# Patient Record
Sex: FEMALE | Race: WHITE | NOT HISPANIC OR LATINO | Employment: UNEMPLOYED | URBAN - METROPOLITAN AREA
[De-identification: names, ages, dates, MRNs, and addresses within clinical notes are randomized per-mention and may not be internally consistent; named-entity substitution may affect disease eponyms.]

---

## 2018-10-23 ENCOUNTER — TELEPHONE (OUTPATIENT)
Dept: NEUROLOGY | Facility: CLINIC | Age: 53
End: 2018-10-23

## 2018-10-23 NOTE — TELEPHONE ENCOUNTER
Called patient and scheduled appointment from referral. Patient is aware to be to appointment 15 minutes early and patient is aware of address. Patient expressed understanding.

## 2018-12-19 ENCOUNTER — OFFICE VISIT (OUTPATIENT)
Dept: NEUROLOGY | Facility: CLINIC | Age: 53
End: 2018-12-19
Payer: COMMERCIAL

## 2018-12-19 VITALS
HEART RATE: 84 BPM | BODY MASS INDEX: 25.44 KG/M2 | HEIGHT: 64 IN | SYSTOLIC BLOOD PRESSURE: 124 MMHG | RESPIRATION RATE: 16 BRPM | DIASTOLIC BLOOD PRESSURE: 82 MMHG | WEIGHT: 149 LBS

## 2018-12-19 DIAGNOSIS — G43.719 INTRACTABLE CHRONIC MIGRAINE WITHOUT AURA AND WITHOUT STATUS MIGRAINOSUS: Primary | ICD-10-CM

## 2018-12-19 PROCEDURE — 99205 OFFICE O/P NEW HI 60 MIN: CPT | Mod: S$GLB,,, | Performed by: PSYCHIATRY & NEUROLOGY

## 2018-12-19 PROCEDURE — 3008F BODY MASS INDEX DOCD: CPT | Mod: CPTII,S$GLB,, | Performed by: PSYCHIATRY & NEUROLOGY

## 2018-12-19 PROCEDURE — 99999 PR PBB SHADOW E&M-EST. PATIENT-LVL III: CPT | Mod: PBBFAC,,, | Performed by: PSYCHIATRY & NEUROLOGY

## 2018-12-19 RX ORDER — LIOTHYRONINE SODIUM 5 UG/1
TABLET ORAL
COMMUNITY
Start: 2018-12-13 | End: 2020-11-24 | Stop reason: ALTCHOICE

## 2018-12-19 RX ORDER — ESTRADIOL 0.05 MG/D
1 FILM, EXTENDED RELEASE TRANSDERMAL
COMMUNITY
Start: 2018-12-13

## 2018-12-19 RX ORDER — PROGESTERONE 100 MG/1
CAPSULE ORAL
Refills: 6 | COMMUNITY
Start: 2018-11-27

## 2018-12-19 RX ORDER — SPIRONOLACTONE 50 MG/1
TABLET, FILM COATED ORAL
COMMUNITY
Start: 2018-12-17 | End: 2022-03-03

## 2018-12-19 RX ORDER — ELETRIPTAN HYDROBROMIDE 40 MG/1
TABLET, FILM COATED ORAL
Qty: 9 TABLET | Refills: 5 | Status: SHIPPED | OUTPATIENT
Start: 2018-12-19 | End: 2019-01-15 | Stop reason: SDUPTHER

## 2018-12-19 RX ORDER — KETOROLAC TROMETHAMINE 15.75 MG/1
15.75 SPRAY, METERED NASAL EVERY 6 HOURS
Qty: 5 EACH | Refills: 3 | Status: SHIPPED | OUTPATIENT
Start: 2018-12-19 | End: 2019-09-06

## 2018-12-19 RX ORDER — ALBUTEROL SULFATE 90 UG/1
AEROSOL, METERED RESPIRATORY (INHALATION)
COMMUNITY
Start: 2018-12-13

## 2018-12-19 NOTE — PROGRESS NOTES
"Subjective:       Patient ID: Mayra Smart is a 53 y.o. female.    Chief Complaint: Headache    HPI   The patient is a pleasant 52 y/o female presenting with chief complaint of headaches. She first developed them around the age of 38 for no apparent reason. She was under the care of a neurologist for a long time. She has tried multiple medications without benefit or poor tolerability as listed below. She has tried different diets, including the "JJ diet" with no help. Hormonal adjustments ineffective as well. Drug holiday for 6 weeks has not helped. Amongst the acute medications, Relpax works the best, she has tried all other triptans, fioricet, fiorinal, and OTC analgesics.  Please see details of headache characteristics below.  Headache questionnaire    1. When did your Headaches start?    15 years ago      2. Where are your headaches located?   Temporal and behind eyes      3. Your headache's characteristics:   Pressure, Throbbing, Pounding, Stabbing, Like a tight band, Sharp      4. How long does the headache last?   days      5. How often does the headache occur?   daily      6. Are your headaches preceded or accompanied by other symptoms? yes   If yes, please describe.  Sometimes nausea      7. Does the headache awaken you at night? yes   If so, how often? 4-5 times a night        8. Please oz the word that best describes your headache's intensity:    severe      9. Using a scale of 1 through 10, with 0 = no pain and 10 = the worst pain:   What score is your headache now? 4   What score is your headache at its worst? 10   What score is your headache at its best? 0        10. Possible associated headache symptoms:  [x]  Sensitivity to light  [x] Dizziness  [] Nasal or sinus pressure/ pain   [x] Sensitivity to noise  [x] Vertigo  [] Problems with concentration  [x] Sensitivity to smells  [] Ringing in ears  [] Problems with memory    [] Blurred vision  [] Irritability  [] Problems with task completion "   [] Double vision  [] Anger  []  Problems with relaxation  [x] Loss of appetite  [] Anxiety  [x] Neck tightness, Neck pain  [x] Nausea   [] Nasal congestion  [x] Vomiting         11. Headache improving factors:  [] Sleep  [] Heat  [] Darkness  [x] Ice  [] Local pressure [] Menses (period)  [] Massage   [x] Medications:        12. Headache worsening factors:   [] Fatigue [] Sneezing  [x] Changes in Weather  [] Light [] Bending Over [] Stress  [] Noise [] Ovulation  [] Multiple Sclerosis Flare-Up  [] Smells  [] Menses  [] Food   [] Coughing [x] Alcohol      13. Number of caffeinated drinks per day: 1      14. Number of diet drinks per day:  0      15. Have you seen any other [facility] within the last 3 years?  No      Please Check any Medications Tried for Headache    AED Neuromodulators  MAOIs  Ergot Alkaloids    Acetazolamide (Diamox) [] Phenelzine (Nardil) [] Dihydroergotamine (Migranal) []   Carbamazepine (Tegretol) [] Tranylcypromine (Parnate) [] Ergotamine (Ergomar) []   Gabapentin (Neurontin) [x] Antihistamine/Serotonergic  Triptans    Lacosamide (Vimpat) [] Cyproheptadine (Periactin) [x] Almotriptan (Axert) [x]   Lamotrigine (Lamictal) [] Antihypertensives  Eletriptan (Relpax) [x]   Levatiracetam (Keppra) [] Atenolol (Tenormin) [] Frovatriptan (Frova) [x]   Oxcarbazepine (Trileptal) [] Bisoprostol (Zebeta) [] Naratriptan (Amerge) []   Phenobarbital [] Candesartan (Atacand) [] Rizatriptan (Maxalt) [x]   Phenytoin (Dilantin) [] Diltiazem (Cardizem) [] Sumatriptan (Imitrex) [x]   Pregabalin (Lyrica) [] Lisinopril (Prinivil, Zestril) [x] Zolmitriptan (Zomig) [x]   Primidone (Mysoline) [] Metoprolol (Toprol) [x] Combo Abortives    Tiagabine (Gabatril) [] Nadolol (Corgard) [x] Butalbital and Acetaminophen (Bupap) []   Topiramate (Topamax)  (Trokendi) [x] Nicardipine (Cardene) []     Vigabatrin (Sabril) [] Nimodipine (Nimotop) [] Butalbital, Acetaminophen, and caffeine (Fioricet) [x]   Valproic Acid  (Depakote) (Divalproex Sodium) [] Propranolol (Inderal) [x]     Zonisamide (Zonegran) [] Telmisartan (Micardis) [] Butalbital, Aspirin, and caffeine (Fiorinal) [x]   Benzodiazepines  Timolol (Blocadren) []     Alprazolam (Xanax) [x] Verapamil (Calan, Verelan) [] Butalbital, Caffeine, Acetaminophen, and Codeine (Fioricet with Codeine) []   Diazepam (Valium) [x] NSAIDs      Lorazepam (Ativan) [x] Acetaminophen (Tylenol) [x]     Clonazepam (Klonopin) [] Acetylsalicylic Acid (Aspirin) [x] Butalbital, Caffeine, Aspirin, and Codeine  (Fiorinal with Codeine) []   Antidepressants  Diclofenac (Cambia) []     Amitriptyline (Elavil) [] Ibuprofen (Motrin) [x]     Desipramine (Norpramin) [] Indomethacin (Indocin) [x] Aspirin, Caffeine, and Acetaminophen (Excedrin) (Goodys) []   Doxepin (Sinequan) [] Ketoprofen (Orudis) [x]     Fluoxetine (Prozac) [] Ketorolac (Toradol) [x] Acetaminophen, Dichloralphenazone, and Isometheptene (Midrin) []   Imipramine (Tofranil) [] Naproxen (Anaprox) (Aleve) [x]     Nortriptyline (Pamelor) [] Meclofenamic Acid (Meclomen) []     Diamox        Venlafaxine (Effexor) [] Meloxicam (Mobic) [] Aspirin, Salicylamide, and Caffeine (BC Powder) [x]          Review of Systems   Constitutional: Positive for fatigue. Negative for activity change, appetite change and fever.   HENT: Negative for congestion, dental problem, hearing loss, sinus pressure, tinnitus, trouble swallowing and voice change.    Eyes: Negative for photophobia, pain, redness and visual disturbance.   Respiratory: Negative for cough, chest tightness and shortness of breath.    Cardiovascular: Negative for chest pain, palpitations and leg swelling.   Gastrointestinal: Negative for abdominal pain, blood in stool, nausea and vomiting.   Endocrine: Positive for cold intolerance and heat intolerance.   Genitourinary: Negative for difficulty urinating, frequency, menstrual problem and urgency.   Musculoskeletal: Negative for arthralgias, back pain,  "gait problem, joint swelling, myalgias, neck pain and neck stiffness.   Skin: Negative.    Neurological: Positive for headaches. Negative for dizziness, tremors, seizures, syncope, facial asymmetry, speech difficulty, weakness, light-headedness and numbness.   Hematological: Negative for adenopathy. Does not bruise/bleed easily.   Psychiatric/Behavioral: Negative for agitation, behavioral problems, confusion, decreased concentration, self-injury, sleep disturbance and suicidal ideas. The patient is not nervous/anxious and is not hyperactive.            Past Medical History:   Diagnosis Date    Back pain     Bell's palsy     Breast disorder     fibercystic tissue    Fibroids     uterine    GERD (gastroesophageal reflux disease)     History of anesthesia reaction     sensitive to anesthesia, " Major hangover"    Neck pain     Pelvic pain in female     left lower quadrant    Preeclampsia      Past Surgical History:   Procedure Laterality Date     SECTION      times 2    CYSTOSCOPY N/A 2012    [facility]    DILATION AND CURETTAGE OF UTERUS  2007    ENDOMETRIAL ABLATION  10/22/2008    BTL    HYSTERECTOMY, TOTAL, LAPAROSCOPIC N/A 2012    [facility] OR    SALPINGECTOMY, LAPAROSCOPIC Right 2012    [facility]    SALPINGO-OOPHORECTOMY Left 2012    [facility] OR     Family History   Problem Relation Age of Onset    Breast cancer Neg Hx     Ovarian cancer Neg Hx     Pulmonary embolism Mother      Social History     Socioeconomic History    Marital status:      Spouse name: Not on file    Number of children: Not on file    Years of education: Not on file    Highest education level: Not on file   Social Needs    Financial resource strain: Not on file    Food insecurity - worry: Not on file    Food insecurity - inability: Not on file    " Transportation needs - medical: Not on file    Transportation needs - non-medical: Not on file   Occupational History    Not on file   Tobacco Use    Smoking status: Never Smoker   Substance and Sexual Activity    Alcohol use: Yes     Comment: ocassionally    Drug use: No    Sexual activity: Yes   Other Topics Concern    Not on file   Social History Narrative    Not on file     Review of patient's allergies indicates:   Allergen Reactions    Cephalosporins Anaphylaxis    Penicillins Anaphylaxis    Codeine Itching and Swelling       Current Outpatient Medications:     eletriptan (RELPAX) 40 MG tablet, Take 40 mg by mouth as needed. may repeat in 2 hours if necessary , Disp: , Rfl:     levothyroxine (TIROSINT) 75 mcg Cap, Take 75 mcg by mouth once daily.  , Disp: , Rfl:     liothyronine (CYTOMEL) 5 MCG Tab, , Disp: , Rfl:     MINIVELLE 0.05 mg/24 hr, , Disp: , Rfl:     progesterone (PROMETRIUM) 100 MG capsule, TK 1 C PO QHS, Disp: , Rfl: 6    spironolactone (ALDACTONE) 50 MG tablet, , Disp: , Rfl:     VENTOLIN HFA 90 mcg/actuation inhaler, , Disp: , Rfl:     DEXLANSOPRAZOLE (DEXILANT ORAL), Take by mouth every morning. , Disp: , Rfl:     eletriptan (RELPAX) 40 MG tablet, may repeat in 2 hours if necessary. Patient has tried and failed all the other triptans, Disp: 9 tablet, Rfl: 5    galcanezumab-gnlm (EMGALITY) 120 mg/mL PnIj, Inject 120 mg into the skin every 28 days., Disp: 1 Syringe, Rfl: 5    ketorolac (SPRIX) 15.75 mg/spray Spry, 15.75 mg by Nasal route every 6 (six) hours., Disp: 5 each, Rfl: 3      Objective:      Vitals:    12/19/18 1051   BP: 124/82   Pulse: 84   Resp: 16     Body mass index is 25.58 kg/m².    Physical Exam    Constitutional:   She appears well-developed and well-nourished. She is well groomed    HENT:    Head: Atraumatic, oral and nasal mucosa intact  Eyes: Conjunctivae and EOM are normal. Pupils are equal, round, and reactive to light OU  Neck: Neck supple. No  thyromegaly present  Cardiovascular: Normal rate and normal heart sounds  No murmur heard  Pulmonary/Chest: Effort normal and breath sounds normal  Musculoskeletal: Normal range of motion. No joint stiffness. No vertebral point tenderness  Skin: Skin is warm and dry  Psychiatric: Normal mood and affect     Neuro exam:    Mental status:  Awake, attentive, Alert, oriented to self, place, year and month  Language function is intact    Cranial Nerves:  Smell was not formally evaluated  Cranial Nerves II - XII: intact  Pursuits were smooth, normal saccades, no nystagmus OU  Funduscopic exam - disc were flat and pink, no exudates or hemorrhages OU  Motor - facial movement was symmetrical and normal     Palate moved well and was symmetrical with normal palatal and oral sensation  Tongue movements were full    Coordination:     Rapid alternating movements and rapid finger tapping - normal bilaterally  Finger to nose - normal and symmetric bilaterally   Arm roll - smooth and symmetric   No intentional or positional tremor.     Motor:  Normal muscle bulk and symmetry. No fasciculations were noted    No pronator drift  Strength 5/5 bilaterally     Reflexes:  Tendon reflexes were 2 + at biceps, triceps, brachioradialis, patellar, and Achilles bilaterally  No clonus was noted     Sensory: Intact to light touch, pin prick in all extremities.     Gait: Romberg absent. Normal gait. Good tandem.     Review of Data:  Lab Results   Component Value Date     11/13/2012    K 4.7 11/13/2012     11/13/2012    CO2 25 11/13/2012    BUN 10 11/13/2012    CREATININE 0.8 11/13/2012    GLU 96 11/13/2012       Lab Results   Component Value Date    WBC 6.73 11/20/2012    HGB 11.1 (L) 11/20/2012    HCT 32.7 (L) 11/20/2012    MCV 88.1 11/20/2012     11/20/2012       Lab Results   Component Value Date    TSH 0.292 (L) 11/13/2012         Assessment and Plan     The patient has chronic migraines ( G43.719) and suffers from headaches  more than 15 days a month lasting more than 4 hours a day with no relief of symptoms despite trying multiple medications as listed above. I believe she is an ideal candidate for Emgality starting with a loading dose of 240 mg SC and 120 mg SC every 28 days thereafter.  For acute treatment, in addition to Relpax, try Sprix NS.  If no help from Emgality consider Methergine, Tizanidine, Periactin, and admission to break the cycle.  I have discussed the side effects of the medications prescribed and the patient acknowledges understanding    Counseling:  More than 50% of the 60 minute encounter was spent face to face counseling the patient regarding current status and future plan of care as well as side of the medications. All questions were answered to patient's satisfaction        Hemanth Kowalski M.D  Medical Director, Headache and Facial Pain  [facility]

## 2018-12-19 NOTE — LETTER
December 20, 2018      Doris Myers MD  [address]  [facility] [address]           [facility] [place]  [address] [facility] [address] [place] [address]  Phone: [phone]  Fax: [phone]          Patient: Mayra Smart   MR Number: [id number]   YOB: 1965   Date of Visit: 12/19/2018       Dear Dr. Doris Myers:    Thank you for referring Mayra Smart to me for evaluation. Attached you will find relevant portions of my assessment and plan of care.    If you have questions, please do not hesitate to call me. I look forward to following Mayra Smart along with you.    Sincerely,    Noelle Kowalski MD    Enclosure  CC:  No Recipients    If you would like to receive this communication electronically, please contact [email or web address] or [phone] to request more information on [employer] access.    For providers and/or their staff who would like to refer a patient to [facility], please contact us through our one-stop-shop provider referral line, [facility], at [phone].    If you feel you have received this communication in error or would no longer like to receive these types of communications, please e-mail [email or web address]

## 2018-12-19 NOTE — PROGRESS NOTES
Headache questionnaire    1. When did your Headaches start?    15 years ago      2. Where are your headaches located?   Temporal and behind eyes      3. Your headache's characteristics:   Pressure, Throbbing, Pounding, Stabbing, Like a tight band, Sharp      4. How long does the headache last?   days      5. How often does the headache occur?   daily      6. Are your headaches preceded or accompanied by other symptoms? yes   If yes, please describe.  Sometimes nausea      7. Does the headache awaken you at night? yes   If so, how often? 4-5 times a night        8. Please oz the word that best describes your headache's intensity:    severe      9. Using a scale of 1 through 10, with 0 = no pain and 10 = the worst pain:   What score is your headache now? 4   What score is your headache at its worst? 10   What score is your headache at its best? 0        10. Possible associated headache symptoms:  [x]  Sensitivity to light  [x] Dizziness  [] Nasal or sinus pressure/ pain   [x] Sensitivity to noise  [x] Vertigo  [] Problems with concentration  [x] Sensitivity to smells  [] Ringing in ears  [] Problems with memory    [] Blurred vision  [] Irritability  [] Problems with task completion   [] Double vision  [] Anger  []  Problems with relaxation  [x] Loss of appetite  [] Anxiety  [x] Neck tightness, Neck pain  [x] Nausea   [] Nasal congestion  [x] Vomiting         11. Headache improving factors:  [] Sleep  [] Heat  [] Darkness  [x] Ice  [] Local pressure [] Menses (period)  [] Massage   [x] Medications:        12. Headache worsening factors:   [] Fatigue [] Sneezing  [x] Changes in Weather  [] Light [] Bending Over [] Stress  [] Noise [] Ovulation  [] Multiple Sclerosis Flare-Up  [] Smells  [] Menses  [] Food   [] Coughing [x] Alcohol      13. Number of caffeinated drinks per day: 1      14. Number of diet drinks per day:  0      15. Have you seen any other [facility]ists within the last 3 years?  No      Please  Check any Medications Tried for Headache    AED Neuromodulators  MAOIs  Ergot Alkaloids    Acetazolamide (Diamox) [] Phenelzine (Nardil) [] Dihydroergotamine (Migranal) []   Carbamazepine (Tegretol) [] Tranylcypromine (Parnate) [] Ergotamine (Ergomar) []   Gabapentin (Neurontin) [x] Antihistamine/Serotonergic  Triptans    Lacosamide (Vimpat) [] Cyproheptadine (Periactin) [x] Almotriptan (Axert) [x]   Lamotrigine (Lamictal) [] Antihypertensives  Eletriptan (Relpax) [x]   Levatiracetam (Keppra) [] Atenolol (Tenormin) [] Frovatriptan (Frova) [x]   Oxcarbazepine (Trileptal) [] Bisoprostol (Zebeta) [] Naratriptan (Amerge) []   Phenobarbital [] Candesartan (Atacand) [] Rizatriptan (Maxalt) [x]   Phenytoin (Dilantin) [] Diltiazem (Cardizem) [] Sumatriptan (Imitrex) [x]   Pregabalin (Lyrica) [] Lisinopril (Prinivil, Zestril) [x] Zolmitriptan (Zomig) [x]   Primidone (Mysoline) [] Metoprolol (Toprol) [x] Combo Abortives    Tiagabine (Gabatril) [] Nadolol (Corgard) [x] Butalbital and Acetaminophen (Bupap) []   Topiramate (Topamax)  (Trokendi) [x] Nicardipine (Cardene) []     Vigabatrin (Sabril) [] Nimodipine (Nimotop) [] Butalbital, Acetaminophen, and caffeine (Fioricet) [x]   Valproic Acid (Depakote) (Divalproex Sodium) [] Propranolol (Inderal) [x]     Zonisamide (Zonegran) [] Telmisartan (Micardis) [] Butalbital, Aspirin, and caffeine (Fiorinal) [x]   Benzodiazepines  Timolol (Blocadren) []     Alprazolam (Xanax) [x] Verapamil (Calan, Verelan) [] Butalbital, Caffeine, Acetaminophen, and Codeine (Fioricet with Codeine) []   Diazepam (Valium) [x] NSAIDs      Lorazepam (Ativan) [x] Acetaminophen (Tylenol) [x]     Clonazepam (Klonopin) [] Acetylsalicylic Acid (Aspirin) [x] Butalbital, Caffeine, Aspirin, and Codeine  (Fiorinal with Codeine) []   Antidepressants  Diclofenac (Cambia) []     Amitriptyline (Elavil) [] Ibuprofen (Motrin) [x]     Desipramine (Norpramin) [] Indomethacin (Indocin) [x] Aspirin, Caffeine, and  Acetaminophen (Excedrin) (Goodys) []   Doxepin (Sinequan) [] Ketoprofen (Orudis) [x]     Fluoxetine (Prozac) [] Ketorolac (Toradol) [x] Acetaminophen, Dichloralphenazone, and Isometheptene (Midrin) []   Imipramine (Tofranil) [] Naproxen (Anaprox) (Aleve) [x]     Nortriptyline (Pamelor) [] Meclofenamic Acid (Meclomen) []     Venlafaxine (Effexor) [] Meloxicam (Mobic) [] Aspirin, Salicylamide, and Caffeine (BC Powder) [x]

## 2018-12-20 ENCOUNTER — TELEPHONE (OUTPATIENT)
Dept: PHARMACY | Facility: CLINIC | Age: 53
End: 2018-12-20

## 2018-12-21 NOTE — TELEPHONE ENCOUNTER
DOCUMENTATION ONLY:  Prior Authorization for Emgality approved from 12/21/18 to 12/21/19    Case Id: [id number]    Co-pay: $45 - $0 with Emgality E-Voucher applied    Forwarded to the clinical pharmacist for consult and shipment.    -ARR

## 2019-01-09 ENCOUNTER — TELEPHONE (OUTPATIENT)
Dept: PHARMACY | Facility: CLINIC | Age: 54
End: 2019-01-09

## 2019-01-09 NOTE — TELEPHONE ENCOUNTER
Initial Emgality consult partially completed on 19. Emgality 120mg will be shipped on Thursday 1/10/19 to arrive at patient's home on 19 via [employer]. $0.00 copay. Patient confirmed that she had the loading dose at the provider's office on 18 - will be due for first maintenance dose on 19. Address confirmed. Confirmed 2 patient identifiers - name and . Therapy Appropriate.    --Injection experience: YES - self administered loading dose at provider's office  Informed patient on online injection video on  website.    Counseled patient on administration directions:  - After the loading dose, inject one injection (120mg) once every 28 days.   - Store in refrigerator prior to use (do not freeze, do not shake, keep in original box until use).   - Take out of the refrigerator 30 minutes prior to injection to reach room temperature.  - Wash hands before and after injection.  - Monthly RX will come with gauze, band aids, and alcohol swabs.  - Patient may self-inject in either the front/top of the thighs, abdomen- but at least 2 inches away from belly button   - Patient was instructed to rotate injections sites monthly.  - Patient is to wipe down the injection site with the alcohol pad, wait to dry.    - Patient will use sharps container; once full, per state law, she/he may securely lock the sharps container and place in trash. Pharmacy will replace the sharps at no additional charge.    Declined full administration consult since she is comfortable from self-administering the loading dose.     Patient was counseled on possible side effects:  - Injection site reaction: redness, soreness, itching, bruising, which should resolve within 3-5 days.  - Allergic reactions: itching, rash, hives, swelling of face, mouth, tongue, throat, trouble breathing.     Consultation included: indication; goals of treatment; storage and handling; side effects; how to handle side effects; the  importance of compliance; the importance of keeping all follow up appointments.  Patient understands to report any medication changes to [facility] and provider. All questions answered and addressed to patients satisfaction. I will f/u with patient in 7-10 days from start, [facility] to contact patient in 3 weeks for refills.      Prabhjot Marin, PharmD  Clinical Pharmacist  [facility]  P: [phone]    [employer] sent to provider on 1/9/19 at 4:54 pm

## 2019-01-14 DIAGNOSIS — G43.719 INTRACTABLE CHRONIC MIGRAINE WITHOUT AURA AND WITHOUT STATUS MIGRAINOSUS: Primary | ICD-10-CM

## 2019-01-15 RX ORDER — ELETRIPTAN HYDROBROMIDE 40 MG/1
TABLET, FILM COATED ORAL
Qty: 9 TABLET | Refills: 5 | Status: SHIPPED | OUTPATIENT
Start: 2019-01-15 | End: 2019-04-03

## 2019-02-06 ENCOUNTER — TELEPHONE (OUTPATIENT)
Dept: PHARMACY | Facility: CLINIC | Age: 54
End: 2019-02-06

## 2019-02-06 NOTE — TELEPHONE ENCOUNTER
Patient returned phone call back regard specialty medication refill for EMGALITY 120mg/mL (1mL/28) $0/004- Patient scheduled to have medication ship out on Mon 2/11 to arrive on Tues 2/12 address confirmed. Patient informed no new medications, conditions or allergies since last talked to [facility]. Patient have no injection remaining & no missed dose. Patient informed next injection due on Wed 2/13. Patient declined questions for the clinical pharmacist. Patient voiced understanding.

## 2019-03-04 ENCOUNTER — TELEPHONE (OUTPATIENT)
Dept: PHARMACY | Facility: CLINIC | Age: 54
End: 2019-03-04

## 2019-04-02 ENCOUNTER — TELEPHONE (OUTPATIENT)
Dept: PHARMACY | Facility: CLINIC | Age: 54
End: 2019-04-02

## 2019-04-03 ENCOUNTER — OFFICE VISIT (OUTPATIENT)
Dept: NEUROLOGY | Facility: CLINIC | Age: 54
End: 2019-04-03
Payer: COMMERCIAL

## 2019-04-03 VITALS
HEART RATE: 94 BPM | BODY MASS INDEX: 24.17 KG/M2 | HEIGHT: 64 IN | SYSTOLIC BLOOD PRESSURE: 113 MMHG | WEIGHT: 141.56 LBS | RESPIRATION RATE: 16 BRPM | DIASTOLIC BLOOD PRESSURE: 80 MMHG

## 2019-04-03 DIAGNOSIS — G43.719 INTRACTABLE CHRONIC MIGRAINE WITHOUT AURA AND WITHOUT STATUS MIGRAINOSUS: Primary | ICD-10-CM

## 2019-04-03 PROCEDURE — 99999 PR PBB SHADOW E&M-EST. PATIENT-LVL III: ICD-10-PCS | Mod: PBBFAC,,, | Performed by: PSYCHIATRY & NEUROLOGY

## 2019-04-03 PROCEDURE — 99999 PR PBB SHADOW E&M-EST. PATIENT-LVL III: CPT | Mod: PBBFAC,,, | Performed by: PSYCHIATRY & NEUROLOGY

## 2019-04-03 PROCEDURE — 99214 PR OFFICE/OUTPT VISIT, EST, LEVL IV, 30-39 MIN: ICD-10-PCS | Mod: S$GLB,,, | Performed by: PSYCHIATRY & NEUROLOGY

## 2019-04-03 PROCEDURE — 3008F PR BODY MASS INDEX (BMI) DOCUMENTED: ICD-10-PCS | Mod: CPTII,S$GLB,, | Performed by: PSYCHIATRY & NEUROLOGY

## 2019-04-03 PROCEDURE — 99214 OFFICE O/P EST MOD 30 MIN: CPT | Mod: S$GLB,,, | Performed by: PSYCHIATRY & NEUROLOGY

## 2019-04-03 PROCEDURE — 3008F BODY MASS INDEX DOCD: CPT | Mod: CPTII,S$GLB,, | Performed by: PSYCHIATRY & NEUROLOGY

## 2019-04-03 RX ORDER — ELETRIPTAN HYDROBROMIDE 40 MG/1
40 TABLET, FILM COATED ORAL
Qty: 12 TABLET | Refills: 6 | Status: SHIPPED | OUTPATIENT
Start: 2019-04-03 | End: 2019-05-03

## 2019-04-03 RX ORDER — ELETRIPTAN HYDROBROMIDE 40 MG/1
40 TABLET, FILM COATED ORAL
Qty: 12 TABLET | Refills: 6 | Status: SHIPPED | OUTPATIENT
Start: 2019-04-03 | End: 2019-04-03 | Stop reason: SDUPTHER

## 2019-04-03 RX ORDER — ELETRIPTAN HYDROBROMIDE 40 MG/1
40 TABLET, FILM COATED ORAL
Qty: 12 TABLET | Refills: 11 | Status: SHIPPED | OUTPATIENT
Start: 2019-04-03 | End: 2019-12-30

## 2019-04-03 RX ORDER — ACETAZOLAMIDE 250 MG/1
250 TABLET ORAL 3 TIMES DAILY
Qty: 10 TABLET | Refills: 11 | Status: SHIPPED | OUTPATIENT
Start: 2019-04-03 | End: 2021-07-29

## 2019-04-04 NOTE — PROGRESS NOTES
"Subjective:       Patient ID: Mayra Smart is a 53 y.o. female.    Chief Complaint: Headache    HPI   The patient is a pleasant 54 y/o female presenting with chief complaint of headaches. She first developed them around the age of 38 for no apparent reason. She was under the care of a neurologist for a long time. She has tried multiple medications without benefit or poor tolerability as listed below. She has tried different diets, including the "JJ diet" with no help. Hormonal adjustments ineffective as well. Drug holiday for 6 weeks has not helped. Amongst the acute medications, Relpax works the best, she has tried all other triptans, fioricet, fiorinal, and OTC analgesics.  Please see details of headache characteristics below.  Headache questionnaire    1. When did your Headaches start?    15 years ago      2. Where are your headaches located?   Temporal and behind eyes      3. Your headache's characteristics:   Pressure, Throbbing, Pounding, Stabbing, Like a tight band, Sharp      4. How long does the headache last?   days      5. How often does the headache occur?   daily      6. Are your headaches preceded or accompanied by other symptoms? yes   If yes, please describe.  Sometimes nausea      7. Does the headache awaken you at night? yes   If so, how often? 4-5 times a night        8. Please oz the word that best describes your headache's intensity:    severe      9. Using a scale of 1 through 10, with 0 = no pain and 10 = the worst pain:   What score is your headache now? 4   What score is your headache at its worst? 10   What score is your headache at its best? 0        10. Possible associated headache symptoms:  [x]  Sensitivity to light  [x] Dizziness  [] Nasal or sinus pressure/ pain   [x] Sensitivity to noise  [x] Vertigo  [] Problems with concentration  [x] Sensitivity to smells  [] Ringing in ears  [] Problems with memory    [] Blurred vision  [] Irritability  [] Problems with task completion "   [] Double vision  [] Anger  []  Problems with relaxation  [x] Loss of appetite  [] Anxiety  [x] Neck tightness, Neck pain  [x] Nausea   [] Nasal congestion  [x] Vomiting         11. Headache improving factors:  [] Sleep  [] Heat  [] Darkness  [x] Ice  [] Local pressure [] Menses (period)  [] Massage   [x] Medications:        12. Headache worsening factors:   [] Fatigue [] Sneezing  [x] Changes in Weather  [] Light [] Bending Over [] Stress  [] Noise [] Ovulation  [] Multiple Sclerosis Flare-Up  [] Smells  [] Menses  [] Food   [] Coughing [x] Alcohol      13. Number of caffeinated drinks per day: 1      14. Number of diet drinks per day:  0      15. Have you seen any other [facility]ists within the last 3 years?  No      Please Check any Medications Tried for Headache    AED Neuromodulators  MAOIs  Ergot Alkaloids    Acetazolamide (Diamox) [] Phenelzine (Nardil) [] Dihydroergotamine (Migranal) []   Carbamazepine (Tegretol) [] Tranylcypromine (Parnate) [] Ergotamine (Ergomar) []   Gabapentin (Neurontin) [x] Antihistamine/Serotonergic  Triptans    Lacosamide (Vimpat) [] Cyproheptadine (Periactin) [x] Almotriptan (Axert) [x]   Lamotrigine (Lamictal) [] Antihypertensives  Eletriptan (Relpax) [x]   Levatiracetam (Keppra) [] Atenolol (Tenormin) [] Frovatriptan (Frova) [x]   Oxcarbazepine (Trileptal) [] Bisoprostol (Zebeta) [] Naratriptan (Amerge) []   Phenobarbital [] Candesartan (Atacand) [] Rizatriptan (Maxalt) [x]   Phenytoin (Dilantin) [] Diltiazem (Cardizem) [] Sumatriptan (Imitrex) [x]   Pregabalin (Lyrica) [] Lisinopril (Prinivil, Zestril) [x] Zolmitriptan (Zomig) [x]   Primidone (Mysoline) [] Metoprolol (Toprol) [x] Combo Abortives    Tiagabine (Gabatril) [] Nadolol (Corgard) [x] Butalbital and Acetaminophen (Bupap) []   Topiramate (Topamax)  (Trokendi) [x] Nicardipine (Cardene) []     Vigabatrin (Sabril) [] Nimodipine (Nimotop) [] Butalbital, Acetaminophen, and caffeine (Fioricet) [x]   Valproic Acid  (Depakote) (Divalproex Sodium) [] Propranolol (Inderal) [x]     Zonisamide (Zonegran) [] Telmisartan (Micardis) [] Butalbital, Aspirin, and caffeine (Fiorinal) [x]   Benzodiazepines  Timolol (Blocadren) []     Alprazolam (Xanax) [x] Verapamil (Calan, Verelan) [] Butalbital, Caffeine, Acetaminophen, and Codeine (Fioricet with Codeine) []   Diazepam (Valium) [x] NSAIDs      Lorazepam (Ativan) [x] Acetaminophen (Tylenol) [x]     Clonazepam (Klonopin) [] Acetylsalicylic Acid (Aspirin) [x] Butalbital, Caffeine, Aspirin, and Codeine  (Fiorinal with Codeine) []   Antidepressants  Diclofenac (Cambia) []     Amitriptyline (Elavil) [] Ibuprofen (Motrin) [x]     Desipramine (Norpramin) [] Indomethacin (Indocin) [x] Aspirin, Caffeine, and Acetaminophen (Excedrin) (Goodys) []   Doxepin (Sinequan) [] Ketoprofen (Orudis) [x]     Fluoxetine (Prozac) [] Ketorolac (Toradol) [x] Acetaminophen, Dichloralphenazone, and Isometheptene (Midrin) []   Imipramine (Tofranil) [] Naproxen (Anaprox) (Aleve) [x]     Nortriptyline (Pamelor) [] Meclofenamic Acid (Meclomen) []     Diamox        Venlafaxine (Effexor) [] Meloxicam (Mobic) [] Aspirin, Salicylamide, and Caffeine (BC Powder) [x]          Review of Systems   Constitutional: Positive for fatigue. Negative for activity change, appetite change and fever.   HENT: Negative for congestion, dental problem, hearing loss, sinus pressure, tinnitus, trouble swallowing and voice change.    Eyes: Negative for photophobia, pain, redness and visual disturbance.   Respiratory: Negative for cough, chest tightness and shortness of breath.    Cardiovascular: Negative for chest pain, palpitations and leg swelling.   Gastrointestinal: Negative for abdominal pain, blood in stool, nausea and vomiting.   Endocrine: Positive for cold intolerance and heat intolerance.   Genitourinary: Negative for difficulty urinating, frequency, menstrual problem and urgency.   Musculoskeletal: Negative for arthralgias, back pain,  "gait problem, joint swelling, myalgias, neck pain and neck stiffness.   Skin: Negative.    Neurological: Positive for headaches. Negative for dizziness, tremors, seizures, syncope, facial asymmetry, speech difficulty, weakness, light-headedness and numbness.   Hematological: Negative for adenopathy. Does not bruise/bleed easily.   Psychiatric/Behavioral: Negative for agitation, behavioral problems, confusion, decreased concentration, self-injury, sleep disturbance and suicidal ideas. The patient is not nervous/anxious and is not hyperactive.            Past Medical History:   Diagnosis Date    Back pain     Bell's palsy     Breast disorder     fibercystic tissue    Fibroids     uterine    GERD (gastroesophageal reflux disease)     History of anesthesia reaction     sensitive to anesthesia, " Major hangover"    Neck pain     Pelvic pain in female     left lower quadrant    Preeclampsia      Past Surgical History:   Procedure Laterality Date     SECTION      times 2    CYSTOSCOPY N/A 2012    [facility]    DILATION AND CURETTAGE OF UTERUS  2007    ENDOMETRIAL ABLATION  10/22/2008    BTL    HYSTERECTOMY, TOTAL, LAPAROSCOPIC N/A 2012    [facility]    SALPINGECTOMY, LAPAROSCOPIC Right 2012    [facility]    SALPINGO-OOPHORECTOMY Left 2012    [facility]     Family History   Problem Relation Age of Onset    Breast cancer Neg Hx     Ovarian cancer Neg Hx     Pulmonary embolism Mother      Social History     Socioeconomic History    Marital status:      Spouse name: Not on file    Number of children: Not on file    Years of education: Not on file    Highest education level: Not on file   Social Needs    Financial resource strain: Not on file    Food insecurity - worry: Not on file    Food insecurity - inability: Not on file    " Transportation needs - medical: Not on file    Transportation needs - non-medical: Not on file   Occupational History    Not on file   Tobacco Use    Smoking status: Never Smoker   Substance and Sexual Activity    Alcohol use: Yes     Comment: ocassionally    Drug use: No    Sexual activity: Yes   Other Topics Concern    Not on file   Social History Narrative    Not on file     Review of patient's allergies indicates:   Allergen Reactions    Cephalosporins Anaphylaxis    Penicillins Anaphylaxis    Codeine Itching and Swelling       Current Outpatient Medications:     eletriptan (RELPAX) 40 MG tablet, Take 40 mg by mouth as needed. may repeat in 2 hours if necessary , Disp: , Rfl:     levothyroxine (TIROSINT) 75 mcg Cap, Take 75 mcg by mouth once daily.  , Disp: , Rfl:     liothyronine (CYTOMEL) 5 MCG Tab, , Disp: , Rfl:     MINIVELLE 0.05 mg/24 hr, , Disp: , Rfl:     progesterone (PROMETRIUM) 100 MG capsule, TK 1 C PO QHS, Disp: , Rfl: 6    spironolactone (ALDACTONE) 50 MG tablet, , Disp: , Rfl:     VENTOLIN HFA 90 mcg/actuation inhaler, , Disp: , Rfl:     DEXLANSOPRAZOLE (DEXILANT ORAL), Take by mouth every morning. , Disp: , Rfl:     eletriptan (RELPAX) 40 MG tablet, may repeat in 2 hours if necessary. Patient has tried and failed all the other triptans, Disp: 9 tablet, Rfl: 5    galcanezumab-gnlm (EMGALITY) 120 mg/mL PnIj, Inject 120 mg into the skin every 28 days., Disp: 1 Syringe, Rfl: 5    ketorolac (SPRIX) 15.75 mg/spray Spry, 15.75 mg by Nasal route every 6 (six) hours., Disp: 5 each, Rfl: 3      Objective:      Vitals:    12/19/18 1051   BP: 124/82   Pulse: 84   Resp: 16     Body mass index is 25.58 kg/m².    Physical Exam    Constitutional:   She appears well-developed and well-nourished. She is well groomed    HENT:    Head: Atraumatic, oral and nasal mucosa intact  Eyes: Conjunctivae and EOM are normal. Pupils are equal, round, and reactive to light OU  Neck: Neck supple. No  thyromegaly present  Cardiovascular: Normal rate and normal heart sounds  No murmur heard  Pulmonary/Chest: Effort normal and breath sounds normal  Musculoskeletal: Normal range of motion. No joint stiffness. No vertebral point tenderness  Skin: Skin is warm and dry  Psychiatric: Normal mood and affect     Neuro exam:    Mental status:  Awake, attentive, Alert, oriented to self, place, year and month  Language function is intact    Cranial Nerves:  Smell was not formally evaluated  Cranial Nerves II - XII: intact  Pursuits were smooth, normal saccades, no nystagmus OU  Funduscopic exam - disc were flat and pink, no exudates or hemorrhages OU  Motor - facial movement was symmetrical and normal     Palate moved well and was symmetrical with normal palatal and oral sensation  Tongue movements were full    Coordination:     Rapid alternating movements and rapid finger tapping - normal bilaterally  Finger to nose - normal and symmetric bilaterally   Arm roll - smooth and symmetric   No intentional or positional tremor.     Motor:  Normal muscle bulk and symmetry. No fasciculations were noted    No pronator drift  Strength 5/5 bilaterally     Reflexes:  Tendon reflexes were 2 + at biceps, triceps, brachioradialis, patellar, and Achilles bilaterally  No clonus was noted     Sensory: Intact to light touch, pin prick in all extremities.     Gait: Romberg absent. Normal gait. Good tandem.     Review of Data:  Lab Results   Component Value Date     11/13/2012    K 4.7 11/13/2012     11/13/2012    CO2 25 11/13/2012    BUN 10 11/13/2012    CREATININE 0.8 11/13/2012    GLU 96 11/13/2012       Lab Results   Component Value Date    WBC 6.73 11/20/2012    HGB 11.1 (L) 11/20/2012    HCT 32.7 (L) 11/20/2012    MCV 88.1 11/20/2012     11/20/2012       Lab Results   Component Value Date    TSH 0.292 (L) 11/13/2012         Assessment and Plan     The patient has chronic migraines ( G43.719) and suffers from headaches  more than 15 days a month lasting more than 4 hours a day with no relief of symptoms despite trying multiple medications as listed above.  Emgality is working very well for her. Will refill 120 mg SC every 28 days.  For acute treatment, in addition to Relpax, try Sprix NS.  Consider Methergine, Tizanidine, Periactin, and admission to break the cycle if she stops responding to Emgality  I have discussed the side effects of the medications prescribed and the patient acknowledges understanding    Counseling:  More than 50% of the 25 minute encounter was spent face to face counseling the patient regarding current status and future plan of care as well as side of the medications. All questions were answered to patient's satisfaction        Hemanth Kowalski M.D  Medical Director, Headache and Facial Pain  [facility]

## 2019-04-25 ENCOUNTER — TELEPHONE (OUTPATIENT)
Dept: PHARMACY | Facility: CLINIC | Age: 54
End: 2019-04-25

## 2019-05-07 ENCOUNTER — TELEPHONE (OUTPATIENT)
Dept: PHARMACY | Facility: CLINIC | Age: 54
End: 2019-05-07

## 2019-05-07 NOTE — TELEPHONE ENCOUNTER
Refill readiness for Emgality confirmed with patient; name/ confirmed; no missed doses; no new medications; no side effects noted.  Patient next dose of Emgality is due on .  Address confirmed for  shipment and 5/10 delivery.

## 2019-05-30 ENCOUNTER — PATIENT MESSAGE (OUTPATIENT)
Dept: NEUROLOGY | Facility: CLINIC | Age: 54
End: 2019-05-30

## 2019-06-03 ENCOUNTER — TELEPHONE (OUTPATIENT)
Dept: PHARMACY | Facility: CLINIC | Age: 54
End: 2019-06-03

## 2019-06-06 ENCOUNTER — PATIENT MESSAGE (OUTPATIENT)
Dept: NEUROLOGY | Facility: CLINIC | Age: 54
End: 2019-06-06

## 2019-06-25 ENCOUNTER — TELEPHONE (OUTPATIENT)
Dept: NEUROLOGY | Facility: CLINIC | Age: 54
End: 2019-06-25

## 2019-06-26 ENCOUNTER — TELEPHONE (OUTPATIENT)
Dept: NEUROLOGY | Facility: CLINIC | Age: 54
End: 2019-06-26

## 2019-06-26 ENCOUNTER — PATIENT MESSAGE (OUTPATIENT)
Dept: NEUROLOGY | Facility: CLINIC | Age: 54
End: 2019-06-26

## 2019-06-26 RX ORDER — METHYLPREDNISOLONE 4 MG/1
TABLET ORAL
Qty: 1 PACKAGE | Refills: 0 | Status: SHIPPED | OUTPATIENT
Start: 2019-06-26 | End: 2019-07-17

## 2019-07-01 ENCOUNTER — PATIENT MESSAGE (OUTPATIENT)
Dept: NEUROLOGY | Facility: CLINIC | Age: 54
End: 2019-07-01

## 2019-07-02 ENCOUNTER — PATIENT MESSAGE (OUTPATIENT)
Dept: NEUROLOGY | Facility: CLINIC | Age: 54
End: 2019-07-02

## 2019-07-08 ENCOUNTER — TELEPHONE (OUTPATIENT)
Dept: PHARMACY | Facility: CLINIC | Age: 54
End: 2019-07-08

## 2019-07-08 DIAGNOSIS — G43.719 INTRACTABLE CHRONIC MIGRAINE WITHOUT AURA AND WITHOUT STATUS MIGRAINOSUS: Primary | ICD-10-CM

## 2019-07-30 ENCOUNTER — TELEPHONE (OUTPATIENT)
Dept: PHARMACY | Facility: CLINIC | Age: 54
End: 2019-07-30

## 2019-08-09 ENCOUNTER — TELEPHONE (OUTPATIENT)
Dept: PHARMACY | Facility: CLINIC | Age: 54
End: 2019-08-09

## 2019-08-19 ENCOUNTER — PATIENT MESSAGE (OUTPATIENT)
Dept: NEUROLOGY | Facility: CLINIC | Age: 54
End: 2019-08-19

## 2019-08-26 ENCOUNTER — TELEPHONE (OUTPATIENT)
Dept: NEUROLOGY | Facility: CLINIC | Age: 54
End: 2019-08-26

## 2019-08-26 ENCOUNTER — OFFICE VISIT (OUTPATIENT)
Dept: NEUROLOGY | Facility: CLINIC | Age: 54
End: 2019-08-26
Payer: COMMERCIAL

## 2019-08-26 VITALS
DIASTOLIC BLOOD PRESSURE: 86 MMHG | HEIGHT: 64 IN | SYSTOLIC BLOOD PRESSURE: 123 MMHG | WEIGHT: 147.5 LBS | BODY MASS INDEX: 25.18 KG/M2 | HEART RATE: 93 BPM | RESPIRATION RATE: 16 BRPM

## 2019-08-26 DIAGNOSIS — G43.719 INTRACTABLE CHRONIC MIGRAINE WITHOUT AURA AND WITHOUT STATUS MIGRAINOSUS: Primary | ICD-10-CM

## 2019-08-26 DIAGNOSIS — G47.9 SLEEP DISTURBANCE: Primary | ICD-10-CM

## 2019-08-26 DIAGNOSIS — G43.719 INTRACTABLE CHRONIC MIGRAINE WITHOUT AURA AND WITHOUT STATUS MIGRAINOSUS: ICD-10-CM

## 2019-08-26 PROCEDURE — 3008F PR BODY MASS INDEX (BMI) DOCUMENTED: ICD-10-PCS | Mod: CPTII,S$GLB,, | Performed by: PSYCHIATRY & NEUROLOGY

## 2019-08-26 PROCEDURE — 99214 OFFICE O/P EST MOD 30 MIN: CPT | Mod: S$GLB,,, | Performed by: PSYCHIATRY & NEUROLOGY

## 2019-08-26 PROCEDURE — 3008F BODY MASS INDEX DOCD: CPT | Mod: CPTII,S$GLB,, | Performed by: PSYCHIATRY & NEUROLOGY

## 2019-08-26 PROCEDURE — 99999 PR PBB SHADOW E&M-EST. PATIENT-LVL III: CPT | Mod: PBBFAC,,, | Performed by: PSYCHIATRY & NEUROLOGY

## 2019-08-26 PROCEDURE — 99999 PR PBB SHADOW E&M-EST. PATIENT-LVL III: ICD-10-PCS | Mod: PBBFAC,,, | Performed by: PSYCHIATRY & NEUROLOGY

## 2019-08-26 PROCEDURE — 99214 PR OFFICE/OUTPT VISIT, EST, LEVL IV, 30-39 MIN: ICD-10-PCS | Mod: S$GLB,,, | Performed by: PSYCHIATRY & NEUROLOGY

## 2019-08-26 RX ORDER — ZOLMITRIPTAN 5 MG/1
1 SPRAY NASAL ONCE AS NEEDED
Qty: 12 EACH | Refills: 3 | Status: SHIPPED | OUTPATIENT
Start: 2019-08-26 | End: 2020-08-25

## 2019-08-26 RX ORDER — SUMATRIPTAN SUCCINATE 6 MG/.5ML
0.5 INJECTION, SOLUTION SUBCUTANEOUS DAILY PRN
Qty: 6 EACH | Refills: 3 | Status: SHIPPED | OUTPATIENT
Start: 2019-08-26 | End: 2019-09-06

## 2019-08-26 NOTE — PROGRESS NOTES
"Subjective:       Patient ID: Mayra Smart is a 53 y.o. female.    Chief Complaint: Headache    INTERVAL HISTORY 8/26/2019  The patient comes for follow up. She was doing very well on Emgality until end of May when it stopped working, now she is having headaches about 20 days per month. Using acute treatmetn almost every day. Waking up with a headache almost daily. Has never had a sleep study. Steroids in June given by her PCP provided no help. Excedrin approximately 20 per month, also taking Relpax and Advil. Relpax was recalled last week (traces of contaminant), Diamox did not help for weather related headaches. Already on Spironolactone. She has a new TMJ splint with undetermined benefit.     HPI   The patient is a pleasant 54 y/o female presenting with chief complaint of headaches. She first developed them around the age of 38 for no apparent reason. She was under the care of a neurologist for a long time. She has tried multiple medications without benefit or poor tolerability as listed below. She has tried different diets, including the "JJ diet" with no help. Hormonal adjustments ineffective as well. Drug holiday for 6 weeks has not helped. Amongst the acute medications, Relpax works the best, she has tried all other triptans, fioricet, fiorinal, and OTC analgesics.  Please see details of headache characteristics below.  Headache questionnaire    1. When did your Headaches start?    15 years ago      2. Where are your headaches located?   Temporal and behind eyes      3. Your headache's characteristics:   Pressure, Throbbing, Pounding, Stabbing, Like a tight band, Sharp      4. How long does the headache last?   days      5. How often does the headache occur?   daily      6. Are your headaches preceded or accompanied by other symptoms? yes   If yes, please describe.  Sometimes nausea      7. Does the headache awaken you at night? yes   If so, how often? 4-5 times a night        8. Please oz the word " that best describes your headache's intensity:    severe      9. Using a scale of 1 through 10, with 0 = no pain and 10 = the worst pain:   What score is your headache now? 4   What score is your headache at its worst? 10   What score is your headache at its best? 0        10. Possible associated headache symptoms:  [x]  Sensitivity to light  [x] Dizziness  [] Nasal or sinus pressure/ pain   [x] Sensitivity to noise  [x] Vertigo  [] Problems with concentration  [x] Sensitivity to smells  [] Ringing in ears  [] Problems with memory    [] Blurred vision  [] Irritability  [] Problems with task completion   [] Double vision  [] Anger  []  Problems with relaxation  [x] Loss of appetite  [] Anxiety  [x] Neck tightness, Neck pain  [x] Nausea   [] Nasal congestion  [x] Vomiting         11. Headache improving factors:  [] Sleep  [] Heat  [] Darkness  [x] Ice  [] Local pressure [] Menses (period)  [] Massage   [x] Medications:        12. Headache worsening factors:   [] Fatigue [] Sneezing  [x] Changes in Weather  [] Light [] Bending Over [] Stress  [] Noise [] Ovulation  [] Multiple Sclerosis Flare-Up  [] Smells  [] Menses  [] Food   [] Coughing [x] Alcohol      13. Number of caffeinated drinks per day: 1      14. Number of diet drinks per day:  0      15. Have you seen any other [facility]ists within the last 3 years?  No      Please Check any Medications Tried for Headache    AED Neuromodulators  MAOIs  Ergot Alkaloids    Acetazolamide (Diamox) [] Phenelzine (Nardil) [] Dihydroergotamine (Migranal) []   Carbamazepine (Tegretol) [] Tranylcypromine (Parnate) [] Ergotamine (Ergomar) []   Gabapentin (Neurontin) [x] Antihistamine/Serotonergic  Triptans    Lacosamide (Vimpat) [] Cyproheptadine (Periactin) [x] Almotriptan (Axert) [x]   Lamotrigine (Lamictal) [] Antihypertensives  Eletriptan (Relpax) [x]   Levatiracetam (Keppra) [] Atenolol (Tenormin) [] Frovatriptan (Frova) [x]   Oxcarbazepine (Trileptal) [] Bisoprostol  (Zebeta) [] Naratriptan (Amerge) []   Phenobarbital [] Candesartan (Atacand) [] Rizatriptan (Maxalt) [x]   Phenytoin (Dilantin) [] Diltiazem (Cardizem) [] Sumatriptan (Imitrex) [x]   Pregabalin (Lyrica) [] Lisinopril (Prinivil, Zestril) [x] Zolmitriptan (Zomig) [x]   Primidone (Mysoline) [] Metoprolol (Toprol) [x] Combo Abortives    Tiagabine (Gabatril) [] Nadolol (Corgard) [x] Butalbital and Acetaminophen (Bupap) []   Topiramate (Topamax)  (Trokendi) [x] Nicardipine (Cardene) []     Vigabatrin (Sabril) [] Nimodipine (Nimotop) [] Butalbital, Acetaminophen, and caffeine (Fioricet) [x]   Valproic Acid (Depakote) (Divalproex Sodium) [] Propranolol (Inderal) [x]     Zonisamide (Zonegran) [] Telmisartan (Micardis) [] Butalbital, Aspirin, and caffeine (Fiorinal) [x]   Benzodiazepines  Timolol (Blocadren) []     Alprazolam (Xanax) [x] Verapamil (Calan, Verelan) [] Butalbital, Caffeine, Acetaminophen, and Codeine (Fioricet with Codeine) []   Diazepam (Valium) [x] NSAIDs      Lorazepam (Ativan) [x] Acetaminophen (Tylenol) [x]     Clonazepam (Klonopin) [] Acetylsalicylic Acid (Aspirin) [x] Butalbital, Caffeine, Aspirin, and Codeine  (Fiorinal with Codeine) []   Antidepressants  Diclofenac (Cambia) []     Amitriptyline (Elavil) [] Ibuprofen (Motrin) [x]     Desipramine (Norpramin) [] Indomethacin (Indocin) [x] Aspirin, Caffeine, and Acetaminophen (Excedrin) (Goodys) []   Doxepin (Sinequan) [] Ketoprofen (Orudis) [x]     Fluoxetine (Prozac) [] Ketorolac (Toradol) [x] Acetaminophen, Dichloralphenazone, and Isometheptene (Midrin) []   Imipramine (Tofranil) [] Naproxen (Anaprox) (Aleve) [x]     Nortriptyline (Pamelor) [] Meclofenamic Acid (Meclomen) []     Diamox        Venlafaxine (Effexor) [] Meloxicam (Mobic) [] Aspirin, Salicylamide, and Caffeine (BC Powder) [x]          Review of Systems   Constitutional: Positive for fatigue. Negative for activity change, appetite change and fever.   HENT: Negative for congestion, dental  "problem, hearing loss, sinus pressure, tinnitus, trouble swallowing and voice change.    Eyes: Negative for photophobia, pain, redness and visual disturbance.   Respiratory: Negative for cough, chest tightness and shortness of breath.    Cardiovascular: Negative for chest pain, palpitations and leg swelling.   Gastrointestinal: Negative for abdominal pain, blood in stool, nausea and vomiting.   Endocrine: Positive for cold intolerance and heat intolerance.   Genitourinary: Negative for difficulty urinating, frequency, menstrual problem and urgency.   Musculoskeletal: Negative for arthralgias, back pain, gait problem, joint swelling, myalgias, neck pain and neck stiffness.   Skin: Negative.    Neurological: Positive for headaches. Negative for dizziness, tremors, seizures, syncope, facial asymmetry, speech difficulty, weakness, light-headedness and numbness.   Hematological: Negative for adenopathy. Does not bruise/bleed easily.   Psychiatric/Behavioral: Negative for agitation, behavioral problems, confusion, decreased concentration, self-injury, sleep disturbance and suicidal ideas. The patient is not nervous/anxious and is not hyperactive.            Past Medical History:   Diagnosis Date    Back pain     Bell's palsy     Breast disorder     fibercystic tissue    Fibroids     uterine    GERD (gastroesophageal reflux disease)     History of anesthesia reaction     sensitive to anesthesia, " Major hangover"    Neck pain     Pelvic pain in female     left lower quadrant    Preeclampsia      Past Surgical History:   Procedure Laterality Date     SECTION      times 2    CYSTOSCOPY N/A 2012    [facility]    DILATION AND CURETTAGE OF UTERUS  2007    ENDOMETRIAL ABLATION  10/22/2008    BTL    HYSTERECTOMY, TOTAL, LAPAROSCOPIC N/A 2012    [facility]    SALPINGECTOMY, LAPAROSCOPIC Right 2012    [facility]" [facility] OR    SALPINGO-OOPHORECTOMY Left 11/19/2012    [facility] OR     Family History   Problem Relation Age of Onset    Breast cancer Neg Hx     Ovarian cancer Neg Hx     Pulmonary embolism Mother      Social History     Socioeconomic History    Marital status:      Spouse name: Not on file    Number of children: Not on file    Years of education: Not on file    Highest education level: Not on file   Social Needs    Financial resource strain: Not on file    Food insecurity - worry: Not on file    Food insecurity - inability: Not on file    Transportation needs - medical: Not on file    Transportation needs - non-medical: Not on file   Occupational History    Not on file   Tobacco Use    Smoking status: Never Smoker   Substance and Sexual Activity    Alcohol use: Yes     Comment: ocassionally    Drug use: No    Sexual activity: Yes   Other Topics Concern    Not on file   Social History Narrative    Not on file     Review of patient's allergies indicates:   Allergen Reactions    Cephalosporins Anaphylaxis    Penicillins Anaphylaxis    Codeine Itching and Swelling       Current Outpatient Medications:     eletriptan (RELPAX) 40 MG tablet, Take 40 mg by mouth as needed. may repeat in 2 hours if necessary , Disp: , Rfl:     levothyroxine (TIROSINT) 75 mcg Cap, Take 75 mcg by mouth once daily.  , Disp: , Rfl:     liothyronine (CYTOMEL) 5 MCG Tab, , Disp: , Rfl:     MINIVELLE 0.05 mg/24 hr, , Disp: , Rfl:     progesterone (PROMETRIUM) 100 MG capsule, TK 1 C PO QHS, Disp: , Rfl: 6    spironolactone (ALDACTONE) 50 MG tablet, , Disp: , Rfl:     VENTOLIN HFA 90 mcg/actuation inhaler, , Disp: , Rfl:     DEXLANSOPRAZOLE (DEXILANT ORAL), Take by mouth every morning. , Disp: , Rfl:     eletriptan (RELPAX) 40 MG tablet, may repeat in 2 hours if necessary. Patient has tried and failed all the other triptans, Disp: 9 tablet, Rfl: 5     galcanezumab-gnlm (EMGALITY) 120 mg/mL PnIj, Inject 120 mg into the skin every 28 days., Disp: 1 Syringe, Rfl: 5    ketorolac (SPRIX) 15.75 mg/spray Spry, 15.75 mg by Nasal route every 6 (six) hours., Disp: 5 each, Rfl: 3      Objective:      Vitals:    08/26/19 1018   BP: 123/86   Pulse: 93   Resp: 16     Body mass index is 25.32 kg/m².    Physical Exam    Constitutional:   She appears well-developed and well-nourished. She is well groomed    HENT:    Head: Atraumatic, oral and nasal mucosa intact  Eyes: Conjunctivae and EOM are normal. Pupils are equal, round, and reactive to light OU  Neck: Neck supple. No thyromegaly present  Cardiovascular: Normal rate and normal heart sounds  No murmur heard  Pulmonary/Chest: Effort normal and breath sounds normal  Musculoskeletal: Normal range of motion. No joint stiffness. No vertebral point tenderness  Skin: Skin is warm and dry  Psychiatric: Normal mood and affect     Neuro exam:    Mental status:  Awake, attentive, Alert, oriented to self, place, year and month  Language function is intact    Cranial Nerves:  Smell was not formally evaluated  Cranial Nerves II - XII: intact  Pursuits were smooth, normal saccades, no nystagmus OU  Funduscopic exam - disc were flat and pink, no exudates or hemorrhages OU  Motor - facial movement was symmetrical and normal     Palate moved well and was symmetrical with normal palatal and oral sensation  Tongue movements were full    Coordination:     Rapid alternating movements and rapid finger tapping - normal bilaterally  Finger to nose - normal and symmetric bilaterally   Arm roll - smooth and symmetric   No intentional or positional tremor.     Motor:  Normal muscle bulk and symmetry. No fasciculations were noted    No pronator drift  Strength 5/5 bilaterally     Reflexes:  Tendon reflexes were 2 + at biceps, triceps, brachioradialis, patellar, and Achilles bilaterally  No clonus was noted     Sensory: Intact to light touch, pin prick  in all extremities.     Gait: Romberg absent. Normal gait. Good tandem.     Review of Data:  Lab Results   Component Value Date     11/13/2012    K 4.7 11/13/2012     11/13/2012    CO2 25 11/13/2012    BUN 10 11/13/2012    CREATININE 0.8 11/13/2012    GLU 96 11/13/2012       Lab Results   Component Value Date    WBC 6.73 11/20/2012    HGB 11.1 (L) 11/20/2012    HCT 32.7 (L) 11/20/2012    MCV 88.1 11/20/2012     11/20/2012       Lab Results   Component Value Date    TSH 0.292 (L) 11/13/2012         Assessment and Plan     The patient has chronic migraines ( G43.719) and suffers from headaches more than 15 days a month lasting more than 4 hours a day with no relief of symptoms despite trying multiple medications as listed above (Gabapentin, Topamax, Toprol, Propranolol, Lisinopril, Emgality). She is an ideal candidate for Botox. Will seek authorization    For acute treatment, switch to Zomig NAsal spray due to severe nausea and Imitrex injections for more severe attacks.   Consider Methergine, Tizanidine, and admission to break the cycle.  I have discussed the side effects of the medications prescribed and the patient acknowledges understanding    Counseling:  More than 50% of the 25 minute encounter was spent face to face counseling the patient regarding current status and future plan of care as well as side of the medications. All questions were answered to patient's satisfaction        Hemanth Kowalski M.D  Medical Director, Headache and Facial Pain  [facility]

## 2019-08-28 ENCOUNTER — TELEPHONE (OUTPATIENT)
Dept: PHARMACY | Facility: CLINIC | Age: 54
End: 2019-08-28

## 2019-08-28 ENCOUNTER — PATIENT MESSAGE (OUTPATIENT)
Dept: NEUROLOGY | Facility: CLINIC | Age: 54
End: 2019-08-28

## 2019-09-04 NOTE — TELEPHONE ENCOUNTER
Patient returned phone call back regarding specialty medication refill for Emgality $0/004- Patient scheduled to have medication shipped out on 9/04/19 to receive on 9/05/19 address confirmed. Patient informed no new medications, conditions or allergies since last talked to [facility]. Patient has 0 injections on hand & no missed doses. Patient declines questions for the clinical pharmacist. Patient voiced understanding. SAMANTHA

## 2019-09-06 ENCOUNTER — PROCEDURE VISIT (OUTPATIENT)
Dept: NEUROLOGY | Facility: CLINIC | Age: 54
End: 2019-09-06
Payer: COMMERCIAL

## 2019-09-06 VITALS
HEIGHT: 64 IN | HEART RATE: 95 BPM | SYSTOLIC BLOOD PRESSURE: 122 MMHG | DIASTOLIC BLOOD PRESSURE: 88 MMHG | RESPIRATION RATE: 16 BRPM | WEIGHT: 145.38 LBS | BODY MASS INDEX: 24.82 KG/M2

## 2019-09-06 DIAGNOSIS — G43.719 INTRACTABLE CHRONIC MIGRAINE WITHOUT AURA AND WITHOUT STATUS MIGRAINOSUS: Primary | ICD-10-CM

## 2019-09-06 PROCEDURE — 64615 PR CHEMODENERVATION OF MUSCLE FOR CHRONIC MIGRAINE: ICD-10-PCS | Mod: S$GLB,,, | Performed by: PSYCHIATRY & NEUROLOGY

## 2019-09-06 PROCEDURE — 64615 CHEMODENERV MUSC MIGRAINE: CPT | Mod: S$GLB,,, | Performed by: PSYCHIATRY & NEUROLOGY

## 2019-09-06 RX ORDER — LEVOTHYROXINE SODIUM 50 UG/1
TABLET ORAL
Refills: 0 | COMMUNITY
Start: 2019-08-19 | End: 2020-11-24 | Stop reason: ALTCHOICE

## 2019-09-06 RX ORDER — CEFUROXIME AXETIL 250 MG/1
TABLET ORAL
Refills: 3 | COMMUNITY
Start: 2019-08-26 | End: 2022-03-03

## 2019-09-06 NOTE — PROCEDURES
Procedures  Subjective:       Patient ID: Mayra Smart is a 53 y.o. female.    Chief Complaint: Headache    BOTOX PROCEDURE    PROCEDURE PERFORMED: Botulinum toxin injection (16373)    CLINICAL INDICATION: G43.719    A time out was conducted just before the start of the procedure to verify the correct patient and procedure, procedure location, and all relevant critical information.     Conventional methods of treatment but the patient has been unresponsive and refractory.The patient meets criteria for chronic headaches according to the ICHD-II, the patient has more than 15 headaches a month which last for more than 4 hours a day.    This is the first Botox injections and I am aiming for at least 50%  improvement in the patient's symptoms. Frequency of treatment is every 3 months unless no response to the treatments, at which time we will discontinue the injections.     DESCRIPTION OF PROCEDURE: After obtaining informed consent and under   aseptic technique, a total of 155 units of botulinum toxin type A were   injected in the following muscles: Procerus 5 units,  5 units   bilaterally, frontalis 20 units, temporalis 20 units bilaterally,   occipitalis 15 units, upper cervical paraspinals 10 units bilaterally and trapezius 15 units bilaterally. The patient was given a total of 155 units in 31 sites.The patient tolerated the procedure well. There were no complications. The patient was given a prescription for repeat treatment in 3 months.       Please see details of headache characteristics below.  Headache questionnaire    1. When did your Headaches start?    15 years ago      2. Where are your headaches located?   Temporal and behind eyes      3. Your headache's characteristics:   Pressure, Throbbing, Pounding, Stabbing, Like a tight band, Sharp      4. How long does the headache last?   days      5. How often does the headache occur?   daily      6. Are your headaches preceded or accompanied by  other symptoms? yes   If yes, please describe.  Sometimes nausea      7. Does the headache awaken you at night? yes   If so, how often? 4-5 times a night        8. Please oz the word that best describes your headache's intensity:    severe      9. Using a scale of 1 through 10, with 0 = no pain and 10 = the worst pain:   What score is your headache now? 4   What score is your headache at its worst? 10   What score is your headache at its best? 0        10. Possible associated headache symptoms:  [x]  Sensitivity to light  [x] Dizziness  [] Nasal or sinus pressure/ pain   [x] Sensitivity to noise  [x] Vertigo  [] Problems with concentration  [x] Sensitivity to smells  [] Ringing in ears  [] Problems with memory    [] Blurred vision  [] Irritability  [] Problems with task completion   [] Double vision  [] Anger  []  Problems with relaxation  [x] Loss of appetite  [] Anxiety  [x] Neck tightness, Neck pain  [x] Nausea   [] Nasal congestion  [x] Vomiting         11. Headache improving factors:  [] Sleep  [] Heat  [] Darkness  [x] Ice  [] Local pressure [] Menses (period)  [] Massage   [x] Medications:        12. Headache worsening factors:   [] Fatigue [] Sneezing  [x] Changes in Weather  [] Light [] Bending Over [] Stress  [] Noise [] Ovulation  [] Multiple Sclerosis Flare-Up  [] Smells  [] Menses  [] Food   [] Coughing [x] Alcohol      13. Number of caffeinated drinks per day: 1      14. Number of diet drinks per day:  0      15. Have you seen any other [facility] within the last 3 years?  No      Please Check any Medications Tried for Headache    AED Neuromodulators  MAOIs  Ergot Alkaloids    Acetazolamide (Diamox) [] Phenelzine (Nardil) [] Dihydroergotamine (Migranal) []   Carbamazepine (Tegretol) [] Tranylcypromine (Parnate) [] Ergotamine (Ergomar) []   Gabapentin (Neurontin) [x] Antihistamine/Serotonergic  Triptans    Lacosamide (Vimpat) [] Cyproheptadine (Periactin) [x] Almotriptan (Axert) [x]    Lamotrigine (Lamictal) [] Antihypertensives  Eletriptan (Relpax) [x]   Levatiracetam (Keppra) [] Atenolol (Tenormin) [] Frovatriptan (Frova) [x]   Oxcarbazepine (Trileptal) [] Bisoprostol (Zebeta) [] Naratriptan (Amerge) []   Phenobarbital [] Candesartan (Atacand) [] Rizatriptan (Maxalt) [x]   Phenytoin (Dilantin) [] Diltiazem (Cardizem) [] Sumatriptan (Imitrex) [x]   Pregabalin (Lyrica) [] Lisinopril (Prinivil, Zestril) [x] Zolmitriptan (Zomig) [x]   Primidone (Mysoline) [] Metoprolol (Toprol) [x] Combo Abortives    Tiagabine (Gabatril) [] Nadolol (Corgard) [x] Butalbital and Acetaminophen (Bupap) []   Topiramate (Topamax)  (Trokendi) [x] Nicardipine (Cardene) []     Vigabatrin (Sabril) [] Nimodipine (Nimotop) [] Butalbital, Acetaminophen, and caffeine (Fioricet) [x]   Valproic Acid (Depakote) (Divalproex Sodium) [] Propranolol (Inderal) [x]     Zonisamide (Zonegran) [] Telmisartan (Micardis) [] Butalbital, Aspirin, and caffeine (Fiorinal) [x]   Benzodiazepines  Timolol (Blocadren) []     Alprazolam (Xanax) [x] Verapamil (Calan, Verelan) [] Butalbital, Caffeine, Acetaminophen, and Codeine (Fioricet with Codeine) []   Diazepam (Valium) [x] NSAIDs      Lorazepam (Ativan) [x] Acetaminophen (Tylenol) [x]     Clonazepam (Klonopin) [] Acetylsalicylic Acid (Aspirin) [x] Butalbital, Caffeine, Aspirin, and Codeine  (Fiorinal with Codeine) []   Antidepressants  Diclofenac (Cambia) []     Amitriptyline (Elavil) [] Ibuprofen (Motrin) [x]     Desipramine (Norpramin) [] Indomethacin (Indocin) [x] Aspirin, Caffeine, and Acetaminophen (Excedrin) (Goodys) []   Doxepin (Sinequan) [] Ketoprofen (Orudis) [x]     Fluoxetine (Prozac) [] Ketorolac (Toradol) [x] Acetaminophen, Dichloralphenazone, and Isometheptene (Midrin) []   Imipramine (Tofranil) [] Naproxen (Anaprox) (Aleve) [x]     Nortriptyline (Pamelor) [] Meclofenamic Acid (Meclomen) []     Diamox        Venlafaxine (Effexor) [] Meloxicam (Mobic) [] Aspirin, Salicylamide, and  "Caffeine (BC Powder) [x]          Review of Systems   Constitutional: Positive for fatigue. Negative for activity change, appetite change and fever.   HENT: Negative for congestion, dental problem, hearing loss, sinus pressure, tinnitus, trouble swallowing and voice change.    Eyes: Negative for photophobia, pain, redness and visual disturbance.   Respiratory: Negative for cough, chest tightness and shortness of breath.    Cardiovascular: Negative for chest pain, palpitations and leg swelling.   Gastrointestinal: Negative for abdominal pain, blood in stool, nausea and vomiting.   Endocrine: Positive for cold intolerance and heat intolerance.   Genitourinary: Negative for difficulty urinating, frequency, menstrual problem and urgency.   Musculoskeletal: Negative for arthralgias, back pain, gait problem, joint swelling, myalgias, neck pain and neck stiffness.   Skin: Negative.    Neurological: Positive for headaches. Negative for dizziness, tremors, seizures, syncope, facial asymmetry, speech difficulty, weakness, light-headedness and numbness.   Hematological: Negative for adenopathy. Does not bruise/bleed easily.   Psychiatric/Behavioral: Negative for agitation, behavioral problems, confusion, decreased concentration, self-injury, sleep disturbance and suicidal ideas. The patient is not nervous/anxious and is not hyperactive.            Past Medical History:   Diagnosis Date    Back pain     Bell's palsy     Breast disorder     fibercystic tissue    Fibroids     uterine    GERD (gastroesophageal reflux disease)     History of anesthesia reaction     sensitive to anesthesia, " Major hangover"    Neck pain     Pelvic pain in female     left lower quadrant    Preeclampsia      Past Surgical History:   Procedure Laterality Date     SECTION      times 2    CYSTOSCOPY N/A 2012    [facility]    DILATION AND CURETTAGE OF UTERUS  2007    ENDOMETRIAL ABLATION  " 10/22/2008    BTL    HYSTERECTOMY, TOTAL, LAPAROSCOPIC N/A 11/19/2012    [facility]    SALPINGECTOMY, LAPAROSCOPIC Right 11/19/2012    [facility]    SALPINGO-OOPHORECTOMY Left 11/19/2012    [facility] OR     Family History   Problem Relation Age of Onset    Breast cancer Neg Hx     Ovarian cancer Neg Hx     Pulmonary embolism Mother      Social History     Socioeconomic History    Marital status:      Spouse name: Not on file    Number of children: Not on file    Years of education: Not on file    Highest education level: Not on file   Social Needs    Financial resource strain: Not on file    Food insecurity - worry: Not on file    Food insecurity - inability: Not on file    Transportation needs - medical: Not on file    Transportation needs - non-medical: Not on file   Occupational History    Not on file   Tobacco Use    Smoking status: Never Smoker   Substance and Sexual Activity    Alcohol use: Yes     Comment: ocassionally    Drug use: No    Sexual activity: Yes   Other Topics Concern    Not on file   Social History Narrative    Not on file     Review of patient's allergies indicates:   Allergen Reactions    Cephalosporins Anaphylaxis    Penicillins Anaphylaxis    Codeine Itching and Swelling       Current Outpatient Medications:     eletriptan (RELPAX) 40 MG tablet, Take 40 mg by mouth as needed. may repeat in 2 hours if necessary , Disp: , Rfl:     levothyroxine (TIROSINT) 75 mcg Cap, Take 75 mcg by mouth once daily.  , Disp: , Rfl:     liothyronine (CYTOMEL) 5 MCG Tab, , Disp: , Rfl:     MINIVELLE 0.05 mg/24 hr, , Disp: , Rfl:     progesterone (PROMETRIUM) 100 MG capsule, TK 1 C PO QHS, Disp: , Rfl: 6    spironolactone (ALDACTONE) 50 MG tablet, , Disp: , Rfl:     VENTOLIN HFA 90 mcg/actuation inhaler, , Disp: , Rfl:     DEXLANSOPRAZOLE (DEXILANT ORAL), Take by mouth every  morning. , Disp: , Rfl:     eletriptan (RELPAX) 40 MG tablet, may repeat in 2 hours if necessary. Patient has tried and failed all the other triptans, Disp: 9 tablet, Rfl: 5    galcanezumab-gnlm (EMGALITY) 120 mg/mL PnIj, Inject 120 mg into the skin every 28 days., Disp: 1 Syringe, Rfl: 5    ketorolac (SPRIX) 15.75 mg/spray Spry, 15.75 mg by Nasal route every 6 (six) hours., Disp: 5 each, Rfl: 3      Objective:      Vitals:    09/06/19 0832   BP: 122/88   Pulse: 95   Resp: 16     Body mass index is 24.96 kg/m².    Physical Exam    Constitutional:   She appears well-developed and well-nourished. She is well groomed      Review of Data:  Lab Results   Component Value Date     11/13/2012    K 4.7 11/13/2012     11/13/2012    CO2 25 11/13/2012    BUN 10 11/13/2012    CREATININE 0.8 11/13/2012    GLU 96 11/13/2012       Lab Results   Component Value Date    WBC 6.73 11/20/2012    HGB 11.1 (L) 11/20/2012    HCT 32.7 (L) 11/20/2012    MCV 88.1 11/20/2012     11/20/2012       Lab Results   Component Value Date    TSH 0.292 (L) 11/13/2012         Assessment and Plan     The patient has chronic migraines ( G43.719) and suffers from headaches more than 15 days a month lasting more than 4 hours a day with no relief of symptoms despite trying multiple medications as listed above (Gabapentin, Topamax, Toprol, Propranolol, Lisinopril, Emgality). She is an ideal candidate for Botox. Will seek authorization    For acute treatment, switch to Zomig NAsal spray due to severe nausea and Imitrex injections for more severe attacks.   Consider Methergine, Tizanidine, and admission to break the cycle.  I have discussed the side effects of the medications prescribed and the patient acknowledges understanding        Hemanth Kowalski M.D  Medical Director, Headache and Facial Pain  [facility]

## 2019-09-30 ENCOUNTER — TELEPHONE (OUTPATIENT)
Dept: PHARMACY | Facility: CLINIC | Age: 54
End: 2019-09-30

## 2019-10-25 ENCOUNTER — TELEPHONE (OUTPATIENT)
Dept: PHARMACY | Facility: CLINIC | Age: 54
End: 2019-10-25

## 2019-11-25 ENCOUNTER — TELEPHONE (OUTPATIENT)
Dept: PHARMACY | Facility: CLINIC | Age: 54
End: 2019-11-25

## 2019-11-27 ENCOUNTER — PROCEDURE VISIT (OUTPATIENT)
Dept: NEUROLOGY | Facility: CLINIC | Age: 54
End: 2019-11-27
Payer: COMMERCIAL

## 2019-11-27 VITALS
HEIGHT: 64 IN | SYSTOLIC BLOOD PRESSURE: 113 MMHG | HEART RATE: 97 BPM | RESPIRATION RATE: 16 BRPM | DIASTOLIC BLOOD PRESSURE: 78 MMHG | WEIGHT: 146.25 LBS | BODY MASS INDEX: 24.97 KG/M2

## 2019-11-27 DIAGNOSIS — G43.719 INTRACTABLE CHRONIC MIGRAINE WITHOUT AURA AND WITHOUT STATUS MIGRAINOSUS: Primary | ICD-10-CM

## 2019-11-27 NOTE — PROCEDURES
Procedures    Subjective:       Patient ID: Mayra Smart is a 53 y.o. female.    Chief Complaint: Headache    BOTOX PROCEDURE    PROCEDURE PERFORMED: Botulinum toxin injection (14547)    CLINICAL INDICATION: G43.719    A time out was conducted just before the start of the procedure to verify the correct patient and procedure, procedure location, and all relevant critical information.     Conventional methods of treatment but the patient has been unresponsive and refractory.The patient meets criteria for chronic headaches according to the ICHD-II, the patient has more than 15 headaches a month which last for more than 4 hours a day.    This is the first Botox injections and I am aiming for at least 50%  improvement in the patient's symptoms. Frequency of treatment is every 3 months unless no response to the treatments, at which time we will discontinue the injections.     DESCRIPTION OF PROCEDURE: After obtaining informed consent and under   aseptic technique, a total of 155 units of botulinum toxin type A were   injected in the following muscles: Procerus 5 units,  5 units   bilaterally, frontalis 20 units, temporalis 20 units bilaterally,   occipitalis 15 units, upper cervical paraspinals 10 units bilaterally and trapezius 15 units bilaterally. The patient was given a total of 155 units in 31 sites.The patient tolerated the procedure well. There were no complications. The patient was given a prescription for repeat treatment in 3 months.       Please see details of headache characteristics below.  Headache questionnaire    1. When did your Headaches start?    15 years ago      2. Where are your headaches located?   Temporal and behind eyes      3. Your headache's characteristics:   Pressure, Throbbing, Pounding, Stabbing, Like a tight band, Sharp      4. How long does the headache last?   days      5. How often does the headache occur?   daily      6. Are your headaches preceded or accompanied by  other symptoms? yes   If yes, please describe.  Sometimes nausea      7. Does the headache awaken you at night? yes   If so, how often? 4-5 times a night        8. Please oz the word that best describes your headache's intensity:    severe      9. Using a scale of 1 through 10, with 0 = no pain and 10 = the worst pain:   What score is your headache now? 4   What score is your headache at its worst? 10   What score is your headache at its best? 0        10. Possible associated headache symptoms:  [x]  Sensitivity to light  [x] Dizziness  [] Nasal or sinus pressure/ pain   [x] Sensitivity to noise  [x] Vertigo  [] Problems with concentration  [x] Sensitivity to smells  [] Ringing in ears  [] Problems with memory    [] Blurred vision  [] Irritability  [] Problems with task completion   [] Double vision  [] Anger  []  Problems with relaxation  [x] Loss of appetite  [] Anxiety  [x] Neck tightness, Neck pain  [x] Nausea   [] Nasal congestion  [x] Vomiting         11. Headache improving factors:  [] Sleep  [] Heat  [] Darkness  [x] Ice  [] Local pressure [] Menses (period)  [] Massage   [x] Medications:        12. Headache worsening factors:   [] Fatigue [] Sneezing  [x] Changes in Weather  [] Light [] Bending Over [] Stress  [] Noise [] Ovulation  [] Multiple Sclerosis Flare-Up  [] Smells  [] Menses  [] Food   [] Coughing [x] Alcohol      13. Number of caffeinated drinks per day: 1      14. Number of diet drinks per day:  0      15. Have you seen any other [facility] within the last 3 years?  No      Please Check any Medications Tried for Headache    AED Neuromodulators  MAOIs  Ergot Alkaloids    Acetazolamide (Diamox) [] Phenelzine (Nardil) [] Dihydroergotamine (Migranal) []   Carbamazepine (Tegretol) [] Tranylcypromine (Parnate) [] Ergotamine (Ergomar) []   Gabapentin (Neurontin) [x] Antihistamine/Serotonergic  Triptans    Lacosamide (Vimpat) [] Cyproheptadine (Periactin) [x] Almotriptan (Axert) [x]    Lamotrigine (Lamictal) [] Antihypertensives  Eletriptan (Relpax) [x]   Levatiracetam (Keppra) [] Atenolol (Tenormin) [] Frovatriptan (Frova) [x]   Oxcarbazepine (Trileptal) [] Bisoprostol (Zebeta) [] Naratriptan (Amerge) []   Phenobarbital [] Candesartan (Atacand) [] Rizatriptan (Maxalt) [x]   Phenytoin (Dilantin) [] Diltiazem (Cardizem) [] Sumatriptan (Imitrex) [x]   Pregabalin (Lyrica) [] Lisinopril (Prinivil, Zestril) [x] Zolmitriptan (Zomig) [x]   Primidone (Mysoline) [] Metoprolol (Toprol) [x] Combo Abortives    Tiagabine (Gabatril) [] Nadolol (Corgard) [x] Butalbital and Acetaminophen (Bupap) []   Topiramate (Topamax)  (Trokendi) [x] Nicardipine (Cardene) []     Vigabatrin (Sabril) [] Nimodipine (Nimotop) [] Butalbital, Acetaminophen, and caffeine (Fioricet) [x]   Valproic Acid (Depakote) (Divalproex Sodium) [] Propranolol (Inderal) [x]     Zonisamide (Zonegran) [] Telmisartan (Micardis) [] Butalbital, Aspirin, and caffeine (Fiorinal) [x]   Benzodiazepines  Timolol (Blocadren) []     Alprazolam (Xanax) [x] Verapamil (Calan, Verelan) [] Butalbital, Caffeine, Acetaminophen, and Codeine (Fioricet with Codeine) []   Diazepam (Valium) [x] NSAIDs      Lorazepam (Ativan) [x] Acetaminophen (Tylenol) [x]     Clonazepam (Klonopin) [] Acetylsalicylic Acid (Aspirin) [x] Butalbital, Caffeine, Aspirin, and Codeine  (Fiorinal with Codeine) []   Antidepressants  Diclofenac (Cambia) []     Amitriptyline (Elavil) [] Ibuprofen (Motrin) [x]     Desipramine (Norpramin) [] Indomethacin (Indocin) [x] Aspirin, Caffeine, and Acetaminophen (Excedrin) (Goodys) []   Doxepin (Sinequan) [] Ketoprofen (Orudis) [x]     Fluoxetine (Prozac) [] Ketorolac (Toradol) [x] Acetaminophen, Dichloralphenazone, and Isometheptene (Midrin) []   Imipramine (Tofranil) [] Naproxen (Anaprox) (Aleve) [x]     Nortriptyline (Pamelor) [] Meclofenamic Acid (Meclomen) []     Diamox        Venlafaxine (Effexor) [] Meloxicam (Mobic) [] Aspirin, Salicylamide, and  "Caffeine (BC Powder) [x]          Review of Systems   Constitutional: Positive for fatigue. Negative for activity change, appetite change and fever.   HENT: Negative for congestion, dental problem, hearing loss, sinus pressure, tinnitus, trouble swallowing and voice change.    Eyes: Negative for photophobia, pain, redness and visual disturbance.   Respiratory: Negative for cough, chest tightness and shortness of breath.    Cardiovascular: Negative for chest pain, palpitations and leg swelling.   Gastrointestinal: Negative for abdominal pain, blood in stool, nausea and vomiting.   Endocrine: Positive for cold intolerance and heat intolerance.   Genitourinary: Negative for difficulty urinating, frequency, menstrual problem and urgency.   Musculoskeletal: Negative for arthralgias, back pain, gait problem, joint swelling, myalgias, neck pain and neck stiffness.   Skin: Negative.    Neurological: Positive for headaches. Negative for dizziness, tremors, seizures, syncope, facial asymmetry, speech difficulty, weakness, light-headedness and numbness.   Hematological: Negative for adenopathy. Does not bruise/bleed easily.   Psychiatric/Behavioral: Negative for agitation, behavioral problems, confusion, decreased concentration, self-injury, sleep disturbance and suicidal ideas. The patient is not nervous/anxious and is not hyperactive.            Past Medical History:   Diagnosis Date    Back pain     Bell's palsy     Breast disorder     fibercystic tissue    Fibroids     uterine    GERD (gastroesophageal reflux disease)     History of anesthesia reaction     sensitive to anesthesia, " Major hangover"    Neck pain     Pelvic pain in female     left lower quadrant    Preeclampsia      Past Surgical History:   Procedure Laterality Date     SECTION      times 2    CYSTOSCOPY N/A 2012    [facility]    DILATION AND CURETTAGE OF UTERUS  2007    ENDOMETRIAL ABLATION  " 10/22/2008    BTL    HYSTERECTOMY, TOTAL, LAPAROSCOPIC N/A 11/19/2012    [facility]    SALPINGECTOMY, LAPAROSCOPIC Right 11/19/2012    [facility]    SALPINGO-OOPHORECTOMY Left 11/19/2012    [facility] OR     Family History   Problem Relation Age of Onset    Breast cancer Neg Hx     Ovarian cancer Neg Hx     Pulmonary embolism Mother      Social History     Socioeconomic History    Marital status:      Spouse name: Not on file    Number of children: Not on file    Years of education: Not on file    Highest education level: Not on file   Social Needs    Financial resource strain: Not on file    Food insecurity - worry: Not on file    Food insecurity - inability: Not on file    Transportation needs - medical: Not on file    Transportation needs - non-medical: Not on file   Occupational History    Not on file   Tobacco Use    Smoking status: Never Smoker   Substance and Sexual Activity    Alcohol use: Yes     Comment: ocassionally    Drug use: No    Sexual activity: Yes   Other Topics Concern    Not on file   Social History Narrative    Not on file     Review of patient's allergies indicates:   Allergen Reactions    Cephalosporins Anaphylaxis    Penicillins Anaphylaxis    Codeine Itching and Swelling       Current Outpatient Medications:     eletriptan (RELPAX) 40 MG tablet, Take 40 mg by mouth as needed. may repeat in 2 hours if necessary , Disp: , Rfl:     levothyroxine (TIROSINT) 75 mcg Cap, Take 75 mcg by mouth once daily.  , Disp: , Rfl:     liothyronine (CYTOMEL) 5 MCG Tab, , Disp: , Rfl:     MINIVELLE 0.05 mg/24 hr, , Disp: , Rfl:     progesterone (PROMETRIUM) 100 MG capsule, TK 1 C PO QHS, Disp: , Rfl: 6    spironolactone (ALDACTONE) 50 MG tablet, , Disp: , Rfl:     VENTOLIN HFA 90 mcg/actuation inhaler, , Disp: , Rfl:     DEXLANSOPRAZOLE (DEXILANT ORAL), Take by mouth every  morning. , Disp: , Rfl:     eletriptan (RELPAX) 40 MG tablet, may repeat in 2 hours if necessary. Patient has tried and failed all the other triptans, Disp: 9 tablet, Rfl: 5    galcanezumab-gnlm (EMGALITY) 120 mg/mL PnIj, Inject 120 mg into the skin every 28 days., Disp: 1 Syringe, Rfl: 5    ketorolac (SPRIX) 15.75 mg/spray Spry, 15.75 mg by Nasal route every 6 (six) hours., Disp: 5 each, Rfl: 3      Objective:      Vitals:    11/27/19 1410   BP: 113/78   Pulse: 97   Resp: 16     Body mass index is 25.11 kg/m².    Physical Exam    Constitutional:   She appears well-developed and well-nourished. She is well groomed      Review of Data:  Lab Results   Component Value Date     11/13/2012    K 4.7 11/13/2012     11/13/2012    CO2 25 11/13/2012    BUN 10 11/13/2012    CREATININE 0.8 11/13/2012    GLU 96 11/13/2012       Lab Results   Component Value Date    WBC 6.73 11/20/2012    HGB 11.1 (L) 11/20/2012    HCT 32.7 (L) 11/20/2012    MCV 88.1 11/20/2012     11/20/2012       Lab Results   Component Value Date    TSH 0.292 (L) 11/13/2012         Assessment and Plan     The patient has chronic migraines ( G43.719) and suffers from headaches more than 15 days a month lasting more than 4 hours a day with no relief of symptoms despite trying multiple medications as listed above (Gabapentin, Topamax, Toprol, Propranolol, Lisinopril, Emgality). She is an ideal candidate for Botox. Will seek authorization    For acute treatment, switch to Zomig NAsal spray due to severe nausea and Imitrex injections for more severe attacks.   Consider Methergine, Tizanidine, and admission to break the cycle.  I have discussed the side effects of the medications prescribed and the patient acknowledges understanding        Hemanth Kowalski M.D  Medical Director, Headache and Facial Pain  [facility]

## 2019-12-10 DIAGNOSIS — G43.719 INTRACTABLE CHRONIC MIGRAINE WITHOUT AURA AND WITHOUT STATUS MIGRAINOSUS: ICD-10-CM

## 2019-12-23 ENCOUNTER — TELEPHONE (OUTPATIENT)
Dept: PHARMACY | Facility: CLINIC | Age: 54
End: 2019-12-23

## 2019-12-30 ENCOUNTER — PATIENT MESSAGE (OUTPATIENT)
Dept: NEUROLOGY | Facility: CLINIC | Age: 54
End: 2019-12-30

## 2019-12-30 RX ORDER — ELETRIPTAN HYDROBROMIDE 40 MG/1
TABLET, FILM COATED ORAL
Qty: 6 TABLET | Refills: 11 | Status: SHIPPED | OUTPATIENT
Start: 2019-12-30 | End: 2020-12-31

## 2019-12-31 ENCOUNTER — TELEPHONE (OUTPATIENT)
Dept: PHARMACY | Facility: CLINIC | Age: 54
End: 2019-12-31

## 2019-12-31 NOTE — TELEPHONE ENCOUNTER
Call to replace misfired Emgality pen.  has provider one-time voucher for replacement. No answer, left voicemail. Sent [employer] message.    Mik Devries, PharmD  Clinical Pharmacist  [facility]  P: [phone]

## 2019-12-31 NOTE — TELEPHONE ENCOUNTER
Patient returned call for replacement Emgality. Shipment set for  to arrive at patient's home on 1/3. $0.00 at 004. No questions or concerns. Patient states that she has had about a 30-40% improvement while on the medication after about a year of taking it. She states that she still gets migraines daily but they are more bearable now with Emgality and Botox. Verified name and .     Mik Devries, PharmD  Clinical Pharmacist  [facility]  P: [phone]

## 2020-01-22 ENCOUNTER — TELEPHONE (OUTPATIENT)
Dept: PHARMACY | Facility: CLINIC | Age: 55
End: 2020-01-22

## 2020-01-31 ENCOUNTER — TELEPHONE (OUTPATIENT)
Dept: PHARMACY | Facility: CLINIC | Age: 55
End: 2020-01-31

## 2020-02-07 ENCOUNTER — PROCEDURE VISIT (OUTPATIENT)
Dept: NEUROLOGY | Facility: CLINIC | Age: 55
End: 2020-02-07
Payer: COMMERCIAL

## 2020-02-07 VITALS
WEIGHT: 145.94 LBS | DIASTOLIC BLOOD PRESSURE: 87 MMHG | RESPIRATION RATE: 16 BRPM | HEART RATE: 82 BPM | BODY MASS INDEX: 24.92 KG/M2 | SYSTOLIC BLOOD PRESSURE: 132 MMHG | HEIGHT: 64 IN

## 2020-02-07 DIAGNOSIS — G43.719 INTRACTABLE CHRONIC MIGRAINE WITHOUT AURA AND WITHOUT STATUS MIGRAINOSUS: Primary | ICD-10-CM

## 2020-02-07 PROCEDURE — 64615 PR CHEMODENERVATION OF MUSCLE FOR CHRONIC MIGRAINE: ICD-10-PCS | Mod: S$GLB,,, | Performed by: PSYCHIATRY & NEUROLOGY

## 2020-02-07 PROCEDURE — 64615 CHEMODENERV MUSC MIGRAINE: CPT | Mod: S$GLB,,, | Performed by: PSYCHIATRY & NEUROLOGY

## 2020-02-07 RX ORDER — UBROGEPANT 50 MG/1
50 TABLET ORAL ONCE AS NEEDED
Qty: 10 TABLET | Refills: 2 | Status: SHIPPED | OUTPATIENT
Start: 2020-02-07 | End: 2021-07-28 | Stop reason: SDUPTHER

## 2020-02-07 NOTE — PROCEDURES
Procedures    Subjective:       Patient ID: Mayra Smart is a 53 y.o. female.    Chief Complaint: Headache    BOTOX PROCEDURE    PROCEDURE PERFORMED: Botulinum toxin injection (63694)    CLINICAL INDICATION: G43.719    A time out was conducted just before the start of the procedure to verify the correct patient and procedure, procedure location, and all relevant critical information.     Conventional methods of treatment but the patient has been unresponsive and refractory.The patient meets criteria for chronic headaches according to the ICHD-II, the patient has more than 15 headaches a month which last for more than 4 hours a day.    This is the first Botox injections and I am aiming for at least 50%  improvement in the patient's symptoms. Frequency of treatment is every 3 months unless no response to the treatments, at which time we will discontinue the injections.     DESCRIPTION OF PROCEDURE: After obtaining informed consent and under aseptic technique, a total of 200 units of botulinum toxin type A were injected in the following muscles: Procerus 5 units,  5 units bilaterally, frontalis 20 units, temporalis 20 units bilaterally,   occipitalis 15 units, upper cervical paraspinals 10 units bilaterally, masseters 35 bilaterally, and trapezius 15 units bilaterally. The patient was given a total of 200 units. The patient tolerated the procedure well. There were no complications. The patient was given a prescription for repeat treatment in 3 months.       Please see details of headache characteristics below.  Headache questionnaire    1. When did your Headaches start?    15 years ago      2. Where are your headaches located?   Temporal and behind eyes      3. Your headache's characteristics:   Pressure, Throbbing, Pounding, Stabbing, Like a tight band, Sharp      4. How long does the headache last?   days      5. How often does the headache occur?   daily      6. Are your headaches preceded or  accompanied by other symptoms? yes   If yes, please describe.  Sometimes nausea      7. Does the headache awaken you at night? yes   If so, how often? 4-5 times a night        8. Please oz the word that best describes your headache's intensity:    severe      9. Using a scale of 1 through 10, with 0 = no pain and 10 = the worst pain:   What score is your headache now? 4   What score is your headache at its worst? 10   What score is your headache at its best? 0        10. Possible associated headache symptoms:  [x]  Sensitivity to light  [x] Dizziness  [] Nasal or sinus pressure/ pain   [x] Sensitivity to noise  [x] Vertigo  [] Problems with concentration  [x] Sensitivity to smells  [] Ringing in ears  [] Problems with memory    [] Blurred vision  [] Irritability  [] Problems with task completion   [] Double vision  [] Anger  []  Problems with relaxation  [x] Loss of appetite  [] Anxiety  [x] Neck tightness, Neck pain  [x] Nausea   [] Nasal congestion  [x] Vomiting         11. Headache improving factors:  [] Sleep  [] Heat  [] Darkness  [x] Ice  [] Local pressure [] Menses (period)  [] Massage   [x] Medications:        12. Headache worsening factors:   [] Fatigue [] Sneezing  [x] Changes in Weather  [] Light [] Bending Over [] Stress  [] Noise [] Ovulation  [] Multiple Sclerosis Flare-Up  [] Smells  [] Menses  [] Food   [] Coughing [x] Alcohol      13. Number of caffeinated drinks per day: 1      14. Number of diet drinks per day:  0      Please Check any Medications Tried for Headache    AED Neuromodulators  MAOIs  Ergot Alkaloids    Acetazolamide (Diamox) [] Phenelzine (Nardil) [] Dihydroergotamine (Migranal) []   Carbamazepine (Tegretol) [] Tranylcypromine (Parnate) [] Ergotamine (Ergomar) []   Gabapentin (Neurontin) [x] Antihistamine/Serotonergic  Triptans    Lacosamide (Vimpat) [] Cyproheptadine (Periactin) [x] Almotriptan (Axert) [x]   Lamotrigine (Lamictal) [] Antihypertensives  Eletriptan (Relpax) [x]    Levatiracetam (Keppra) [] Atenolol (Tenormin) [] Frovatriptan (Frova) [x]   Oxcarbazepine (Trileptal) [] Bisoprostol (Zebeta) [] Naratriptan (Amerge) []   Phenobarbital [] Candesartan (Atacand) [] Rizatriptan (Maxalt) [x]   Phenytoin (Dilantin) [] Diltiazem (Cardizem) [] Sumatriptan (Imitrex) [x]   Pregabalin (Lyrica) [] Lisinopril (Prinivil, Zestril) [x] Zolmitriptan (Zomig) [x]   Primidone (Mysoline) [] Metoprolol (Toprol) [x] Combo Abortives    Tiagabine (Gabatril) [] Nadolol (Corgard) [x] Butalbital and Acetaminophen (Bupap) []   Topiramate (Topamax)  (Trokendi) [x] Nicardipine (Cardene) []     Vigabatrin (Sabril) [] Nimodipine (Nimotop) [] Butalbital, Acetaminophen, and caffeine (Fioricet) [x]   Valproic Acid (Depakote) (Divalproex Sodium) [] Propranolol (Inderal) [x]     Zonisamide (Zonegran) [] Telmisartan (Micardis) [] Butalbital, Aspirin, and caffeine (Fiorinal) [x]   Benzodiazepines  Timolol (Blocadren) []     Alprazolam (Xanax) [x] Verapamil (Calan, Verelan) [] Butalbital, Caffeine, Acetaminophen, and Codeine (Fioricet with Codeine) []   Diazepam (Valium) [x] NSAIDs      Lorazepam (Ativan) [x] Acetaminophen (Tylenol) [x]     Clonazepam (Klonopin) [] Acetylsalicylic Acid (Aspirin) [x] Butalbital, Caffeine, Aspirin, and Codeine  (Fiorinal with Codeine) []   Antidepressants  Diclofenac (Cambia) []     Amitriptyline (Elavil) [] Ibuprofen (Motrin) [x]     Desipramine (Norpramin) [] Indomethacin (Indocin) [x] Aspirin, Caffeine, and Acetaminophen (Excedrin) (Goodys) []   Doxepin (Sinequan) [] Ketoprofen (Orudis) [x]     Fluoxetine (Prozac) [] Ketorolac (Toradol) [x] Acetaminophen, Dichloralphenazone, and Isometheptene (Midrin) []   Imipramine (Tofranil) [] Naproxen (Anaprox) (Aleve) [x]     Nortriptyline (Pamelor) [] Meclofenamic Acid (Meclomen) []     Diamox        Venlafaxine (Effexor) [] Meloxicam (Mobic) [] Aspirin, Salicylamide, and Caffeine (BC Powder) [x]          Review of Systems   Constitutional:  "Positive for fatigue. Negative for activity change, appetite change and fever.   HENT: Negative for congestion, dental problem, hearing loss, sinus pressure, tinnitus, trouble swallowing and voice change.    Eyes: Negative for photophobia, pain, redness and visual disturbance.   Respiratory: Negative for cough, chest tightness and shortness of breath.    Cardiovascular: Negative for chest pain, palpitations and leg swelling.   Gastrointestinal: Negative for abdominal pain, blood in stool, nausea and vomiting.   Endocrine: Positive for cold intolerance and heat intolerance.   Genitourinary: Negative for difficulty urinating, frequency, menstrual problem and urgency.   Musculoskeletal: Negative for arthralgias, back pain, gait problem, joint swelling, myalgias, neck pain and neck stiffness.   Skin: Negative.    Neurological: Positive for headaches. Negative for dizziness, tremors, seizures, syncope, facial asymmetry, speech difficulty, weakness, light-headedness and numbness.   Hematological: Negative for adenopathy. Does not bruise/bleed easily.   Psychiatric/Behavioral: Negative for agitation, behavioral problems, confusion, decreased concentration, self-injury, sleep disturbance and suicidal ideas. The patient is not nervous/anxious and is not hyperactive.            Past Medical History:   Diagnosis Date    Back pain     Bell's palsy     Breast disorder     fibercystic tissue    Fibroids     uterine    GERD (gastroesophageal reflux disease)     History of anesthesia reaction     sensitive to anesthesia, " Major hangover"    Neck pain     Pelvic pain in female     left lower quadrant    Preeclampsia      Past Surgical History:   Procedure Laterality Date     SECTION      times 2    CYSTOSCOPY N/A 2012    [facility]    DILATION AND CURETTAGE OF UTERUS  2007    ENDOMETRIAL ABLATION  10/22/2008    BTL    HYSTERECTOMY, TOTAL, LAPAROSCOPIC N/A 2012 "    [facility]    SALPINGECTOMY, LAPAROSCOPIC Right 11/19/2012    [facility]    SALPINGO-OOPHORECTOMY Left 11/19/2012    [facility] OR     Family History   Problem Relation Age of Onset    Breast cancer Neg Hx     Ovarian cancer Neg Hx     Pulmonary embolism Mother      Social History     Socioeconomic History    Marital status:      Spouse name: Not on file    Number of children: Not on file    Years of education: Not on file    Highest education level: Not on file   Social Needs    Financial resource strain: Not on file    Food insecurity - worry: Not on file    Food insecurity - inability: Not on file    Transportation needs - medical: Not on file    Transportation needs - non-medical: Not on file   Occupational History    Not on file   Tobacco Use    Smoking status: Never Smoker   Substance and Sexual Activity    Alcohol use: Yes     Comment: ocassionally    Drug use: No    Sexual activity: Yes   Other Topics Concern    Not on file   Social History Narrative    Not on file     Review of patient's allergies indicates:   Allergen Reactions    Cephalosporins Anaphylaxis    Penicillins Anaphylaxis    Codeine Itching and Swelling       Current Outpatient Medications:     eletriptan (RELPAX) 40 MG tablet, Take 40 mg by mouth as needed. may repeat in 2 hours if necessary , Disp: , Rfl:     levothyroxine (TIROSINT) 75 mcg Cap, Take 75 mcg by mouth once daily.  , Disp: , Rfl:     liothyronine (CYTOMEL) 5 MCG Tab, , Disp: , Rfl:     MINIVELLE 0.05 mg/24 hr, , Disp: , Rfl:     progesterone (PROMETRIUM) 100 MG capsule, TK 1 C PO QHS, Disp: , Rfl: 6    spironolactone (ALDACTONE) 50 MG tablet, , Disp: , Rfl:     VENTOLIN HFA 90 mcg/actuation inhaler, , Disp: , Rfl:     DEXLANSOPRAZOLE (DEXILANT ORAL), Take by mouth every morning. , Disp: , Rfl:     eletriptan (RELPAX) 40 MG tablet, may repeat  in 2 hours if necessary. Patient has tried and failed all the other triptans, Disp: 9 tablet, Rfl: 5    galcanezumab-gnlm (EMGALITY) 120 mg/mL PnIj, Inject 120 mg into the skin every 28 days., Disp: 1 Syringe, Rfl: 5    ketorolac (SPRIX) 15.75 mg/spray Spry, 15.75 mg by Nasal route every 6 (six) hours., Disp: 5 each, Rfl: 3      Objective:      Vitals:    02/07/20 0820   BP: 132/87   Pulse: 82   Resp: 16     Body mass index is 25.05 kg/m².    Physical Exam    Constitutional:   She appears well-developed and well-nourished. She is well groomed      Review of Data:  Lab Results   Component Value Date     11/13/2012    K 4.7 11/13/2012     11/13/2012    CO2 25 11/13/2012    BUN 10 11/13/2012    CREATININE 0.8 11/13/2012    GLU 96 11/13/2012       Lab Results   Component Value Date    WBC 6.73 11/20/2012    HGB 11.1 (L) 11/20/2012    HCT 32.7 (L) 11/20/2012    MCV 88.1 11/20/2012     11/20/2012       Lab Results   Component Value Date    TSH 0.292 (L) 11/13/2012         Assessment and Plan     The patient has chronic migraines ( G43.719) and suffers from headaches more than 15 days a month lasting more than 4 hours a day with no relief of symptoms despite trying multiple medications as listed above (Gabapentin, Topamax, Toprol, Propranolol, Lisinopril, Emgality).  Continue Botox.  Add Ubrelvy    For acute treatment, switch to Zomig NAsal spray due to severe nausea and Imitrex injections for more severe attacks.   Consider Methergine, Tizanidine, and admission to break the cycle.  I have discussed the side effects of the medications prescribed and the patient acknowledges understanding        Hemanth Kowalski M.D  Medical Director, Headache and Facial Pain  [facility]

## 2020-02-13 ENCOUNTER — TELEPHONE (OUTPATIENT)
Dept: PHARMACY | Facility: CLINIC | Age: 55
End: 2020-02-13

## 2020-02-13 NOTE — TELEPHONE ENCOUNTER
The prior authorization for Mayrabladimir Smart's Ubrelvy has been submitted on 2/13/2020 @ 1:11pm.      It can take up to 72 hours for a decision to be rendered from the insurance.      An unsuccessful attempt to notify the patient was made today.  A voice message was left for the patient.      Please let me know if you have any questions.    Thank You!  Alba Oden  Patient Care Advocate  [facility]  Phone: [phone] ext [phone]  Fax: [phone]

## 2020-02-13 NOTE — TELEPHONE ENCOUNTER
Good Afternoon,     The prior authorization for Mayra Smart's Ubrelvy 50mg prescription has been APPROVED FROM 2/13/2020 TO 2/12/2021 with copayment of $45.00, copay card applied reducing cost to $10.00.       We were unable to reach patient on 2/13/2020.  A voicemail was left for the patient of the prior authorization status along with an appropriate pharmacy phone number should he/she have questions.    If there are any additional questions or concerns, please contact me.    Thank You!  Alba Oden  Patient Care Advocate  [facility]  Phone: [phone] ext [phone]  Fax: [phone]

## 2020-02-27 ENCOUNTER — TELEPHONE (OUTPATIENT)
Dept: PHARMACY | Facility: CLINIC | Age: 55
End: 2020-02-27

## 2020-03-25 ENCOUNTER — TELEPHONE (OUTPATIENT)
Dept: PHARMACY | Facility: CLINIC | Age: 55
End: 2020-03-25

## 2020-04-02 ENCOUNTER — TELEPHONE (OUTPATIENT)
Dept: NEUROLOGY | Facility: CLINIC | Age: 55
End: 2020-04-02

## 2020-04-02 NOTE — TELEPHONE ENCOUNTER
Spoke with patient and she was able to get this gilled. She was notified to call us in the future if needed.

## 2020-04-23 ENCOUNTER — TELEPHONE (OUTPATIENT)
Dept: PHARMACY | Facility: CLINIC | Age: 55
End: 2020-04-23

## 2020-04-23 NOTE — TELEPHONE ENCOUNTER
Refill call regarding Emgality  from [facility]. Patient reached and informed of copay of $0 @004. Patients next dose is scheduled for 4/30 shipping out 4/28 for 4/29 arrival with patients consent.

## 2020-04-24 ENCOUNTER — PATIENT MESSAGE (OUTPATIENT)
Dept: NEUROLOGY | Facility: CLINIC | Age: 55
End: 2020-04-24

## 2020-05-01 ENCOUNTER — PATIENT MESSAGE (OUTPATIENT)
Dept: NEUROLOGY | Facility: CLINIC | Age: 55
End: 2020-05-01

## 2020-05-01 ENCOUNTER — PROCEDURE VISIT (OUTPATIENT)
Dept: NEUROLOGY | Facility: CLINIC | Age: 55
End: 2020-05-01
Payer: COMMERCIAL

## 2020-05-01 VITALS
HEIGHT: 64 IN | DIASTOLIC BLOOD PRESSURE: 98 MMHG | BODY MASS INDEX: 25.6 KG/M2 | WEIGHT: 149.94 LBS | SYSTOLIC BLOOD PRESSURE: 142 MMHG | RESPIRATION RATE: 20 BRPM | HEART RATE: 105 BPM | TEMPERATURE: 98 F

## 2020-05-01 DIAGNOSIS — G43.719 INTRACTABLE CHRONIC MIGRAINE WITHOUT AURA AND WITHOUT STATUS MIGRAINOSUS: Primary | ICD-10-CM

## 2020-05-01 PROCEDURE — 64615 PR CHEMODENERVATION OF MUSCLE FOR CHRONIC MIGRAINE: ICD-10-PCS | Mod: S$GLB,,, | Performed by: PSYCHIATRY & NEUROLOGY

## 2020-05-01 PROCEDURE — 64615 CHEMODENERV MUSC MIGRAINE: CPT | Mod: S$GLB,,, | Performed by: PSYCHIATRY & NEUROLOGY

## 2020-05-01 RX ORDER — LIDOCAINE AND PRILOCAINE 25; 25 MG/G; MG/G
CREAM TOPICAL
Qty: 25 G | Refills: 2 | Status: SHIPPED | OUTPATIENT
Start: 2020-05-01 | End: 2021-11-09

## 2020-05-01 NOTE — PROCEDURES
BOTOX PROCEDURE    PROCEDURE PERFORMED: Botulinum toxin injection (60262)    CLINICAL INDICATION: G43.719    A time out was conducted just before the start of the procedure to verify the correct patient and procedure, procedure location, and all relevant critical information.     The Botox injections have achieved well over 50%  improvement in the patient's symptoms. Migraines have been reduced at least 7 days per month and the number of cumulative hours suffering with headaches has been reduced at least 100 total hours per month. Today she does have a headache indicating that the Botox has worn off. Frequency of treatment is every 3 months unless no response to the treatments, at which time we will discontinue the injections.      DESCRIPTION OF PROCEDURE: After obtaining informed consent and under   aseptic technique, a total of 155 units of botulinum toxin type A were   injected in the following muscles: Procerus 5 units,  5 units   bilaterally, frontalis 20 units, temporalis 20 units bilaterally,   occipitalis 15 units, upper cervical paraspinals 10 units bilaterally and trapezius 15 units bilaterally. The patient was given a total of 155 units in 31 sites.The patient tolerated the procedure well. There were no complications. The patient was given a prescription for repeat treatment in 3 months.     Unavoidable waste 45 units      Hemanth Kowalski M.D  Medical Director, Headache and Facial Pain  [facility]

## 2020-05-08 ENCOUNTER — PATIENT MESSAGE (OUTPATIENT)
Dept: NEUROLOGY | Facility: CLINIC | Age: 55
End: 2020-05-08

## 2020-05-21 DIAGNOSIS — G43.719 INTRACTABLE CHRONIC MIGRAINE WITHOUT AURA AND WITHOUT STATUS MIGRAINOSUS: ICD-10-CM

## 2020-05-22 ENCOUNTER — TELEPHONE (OUTPATIENT)
Dept: PHARMACY | Facility: CLINIC | Age: 55
End: 2020-05-22

## 2020-05-26 NOTE — TELEPHONE ENCOUNTER
RX outgoing call regarding Emgality @ [facility]. Shipping out  for  arrival with patients consent. Copay of $0.00 @ 004. Address and  confirmed. Patient has 0.00 doses on hand at this time. Patient has not started any new medications, has had no missed doses and no side effects present. Patient is currently taking the medication as directed by doctors instruction. Patient states they do not have any questions or concerns at this time. Patients next injection is scheduled for Wednesday,

## 2020-06-23 ENCOUNTER — TELEPHONE (OUTPATIENT)
Dept: NEUROLOGY | Facility: CLINIC | Age: 55
End: 2020-06-23

## 2020-06-23 NOTE — TELEPHONE ENCOUNTER
Spoke with [facility] pharmacy. Pt picked up script this month. Will be able to use coupon card next month. Verbalized understanding.

## 2020-06-23 NOTE — TELEPHONE ENCOUNTER
Spoke with [facility] pharmacy. Relpax 40 mg not covered with pt insurance. Requesting preferred alternative eletriptan hbr. Message sent to provider to clarify.

## 2020-06-24 ENCOUNTER — TELEPHONE (OUTPATIENT)
Dept: NEUROLOGY | Facility: CLINIC | Age: 55
End: 2020-06-24

## 2020-06-24 ENCOUNTER — TELEPHONE (OUTPATIENT)
Dept: PHARMACY | Facility: CLINIC | Age: 55
End: 2020-06-24

## 2020-06-24 NOTE — TELEPHONE ENCOUNTER
----- Message from Shantal Rosen sent at 6/24/2020  9:12 AM CDT -----  Regarding: Pharmacy Calling to Clarify an RX  Contact: Carmen stahl/ [facility]  Type:  Pharmacy Calling to Clarify an RX    Name of Caller:   Carmen  Pharmacy Name:   [facility]  Prescription Name:   lidocaine-prilocaine (EMLA) cream  What do they need to clarify?: Do pt need brand or generic?  Best Call Back Number:  [phone]   Additional Information:  Please advise. Case # [id number]

## 2020-06-24 NOTE — TELEPHONE ENCOUNTER
Spoke with [facility]. Pt lidocaine-prilocaine denied with insurance. Suggest pt trying generic. Message sent to provider. Verbalized understanding.

## 2020-06-25 NOTE — TELEPHONE ENCOUNTER
Called [facility]. Informed them that the EMLA cream could be generic. They stated the insurance covers the generic and it was approved. Called patient and left message to inform patient that it was approved.

## 2020-06-25 NOTE — TELEPHONE ENCOUNTER
----- Message from Jaycob Escobar sent at 6/25/2020 10:19 AM CDT -----  Regarding: pt  Type:  Pharmacy Calling to Clarify an RX    Name of Caller:  Mali  Pharmacy Name:  [facility]  Prescription Name:  lidocaine-prilocaine (EMLA) cream   Sent to pharmacy as: lidocaine-prilocaine (EMLA) cream     What do they need to clarify?:  is this generic or brand being reqested for [place]  Best Call Back Number:  [phone] REF [id number]  Additional Information:  pharmacy has been running as generic since may 1. Please call to clarify

## 2020-07-17 ENCOUNTER — TELEPHONE (OUTPATIENT)
Dept: PHARMACY | Facility: CLINIC | Age: 55
End: 2020-07-17

## 2020-07-20 ENCOUNTER — PROCEDURE VISIT (OUTPATIENT)
Dept: NEUROLOGY | Facility: CLINIC | Age: 55
End: 2020-07-20
Payer: COMMERCIAL

## 2020-07-20 VITALS
HEIGHT: 64 IN | DIASTOLIC BLOOD PRESSURE: 95 MMHG | TEMPERATURE: 98 F | WEIGHT: 154 LBS | SYSTOLIC BLOOD PRESSURE: 144 MMHG | RESPIRATION RATE: 16 BRPM | HEART RATE: 94 BPM | BODY MASS INDEX: 26.29 KG/M2

## 2020-07-20 DIAGNOSIS — G43.719 INTRACTABLE CHRONIC MIGRAINE WITHOUT AURA AND WITHOUT STATUS MIGRAINOSUS: Primary | ICD-10-CM

## 2020-07-20 PROCEDURE — 64615 PR CHEMODENERVATION OF MUSCLE FOR CHRONIC MIGRAINE: ICD-10-PCS | Mod: S$GLB,,, | Performed by: PSYCHIATRY & NEUROLOGY

## 2020-07-20 PROCEDURE — 64615 CHEMODENERV MUSC MIGRAINE: CPT | Mod: S$GLB,,, | Performed by: PSYCHIATRY & NEUROLOGY

## 2020-07-20 RX ORDER — RIMEGEPANT SULFATE 75 MG/75MG
TABLET, ORALLY DISINTEGRATING ORAL
Qty: 8 TABLET | Refills: 11 | Status: SHIPPED | OUTPATIENT
Start: 2020-07-20 | End: 2021-07-27 | Stop reason: SDUPTHER

## 2020-07-20 NOTE — PROCEDURES
ProceduresBOTOX PROCEDURE    PROCEDURE PERFORMED: Botulinum toxin injection (50356)    CLINICAL INDICATION: G43.719    A time out was conducted just before the start of the procedure to verify the correct patient and procedure, procedure location, and all relevant critical information.     The Botox injections have achieved well over 50%  improvement in the patient's symptoms. Migraines have been reduced at least 7 days per month and the number of cumulative hours suffering with headaches has been reduced at least 100 total hours per month. Today she does have a headache indicating that the Botox has worn off. Frequency of treatment is every 3 months unless no response to the treatments, at which time we will discontinue the injections.      DESCRIPTION OF PROCEDURE: After obtaining informed consent and under aseptic technique, a total of 155 units of botulinum toxin type A were injected in the following muscles: Procerus 5 units,  5 units bilaterally, frontalis 20 units, temporalis 20 units bilaterally, occipitalis 15 units, upper cervical paraspinals 10 units bilaterally and trapezius 15 units bilaterally. The patient was given a total of 155 units in 31 sites.The patient tolerated the procedure well. There were no complications. The patient was given a prescription for repeat treatment in 3 months.     Unavoidable waste 45 units      Hemanth Kowalski M.D  Medical Director, Headache and Facial Pain  [facility]

## 2020-08-14 ENCOUNTER — TELEPHONE (OUTPATIENT)
Dept: PHARMACY | Facility: CLINIC | Age: 55
End: 2020-08-14

## 2020-08-14 NOTE — TELEPHONE ENCOUNTER
RX call attempt 1 regarding Emgality refill from [facility]. Patient reached-- shipping out  for  arrival with patients consent. Copay of 0.00 @ 004. Address and  confirmed. Patient has 0 doses of medication on hand at this time. Patient has not started any new medications, has had no missed doses and no side effects present. Patient is currently taking the medication as directed by doctors instruction. Patient does have a safe place in their residence to keep medication at desired temperature away from small children and pets. Patient also does have the capability of contacting 911 in the event of an emergency. Patient states they do not have any questions or concerns at this time. Patient stated her next injection is scheduled for Saturday, .

## 2020-09-12 ENCOUNTER — TELEPHONE (OUTPATIENT)
Dept: PHARMACY | Facility: CLINIC | Age: 55
End: 2020-09-12

## 2020-09-16 ENCOUNTER — PATIENT MESSAGE (OUTPATIENT)
Dept: NEUROLOGY | Facility: CLINIC | Age: 55
End: 2020-09-16

## 2020-09-17 ENCOUNTER — TELEPHONE (OUTPATIENT)
Dept: PHARMACY | Facility: CLINIC | Age: 55
End: 2020-09-17

## 2020-09-24 NOTE — TELEPHONE ENCOUNTER
FOR DOCUMENTATION ONLY:  Financial Assistance for Emgality approved from 9/24 to 9/24/21  Source Emgality   BIN: [id number]  PCN:   Id: [id number]   GRP: [id number]     Assistance in the amount of 4167

## 2020-09-24 NOTE — TELEPHONE ENCOUNTER
Incoming call for Emgality Refill. Patient gaver permission for shipment and also to sign up for copay card. Shipping  for . Copay now 0.00. Patient has not started any new medications including OTC drugs. Patient has not had any medication/ dose or instruction changes. No new allergies or side effects reported with this shipment. Medication is being taken as prescribed by physician and properly stored. Two patient identifiers:  and Address verified. Patient has no questions or concerns for [facility].  next injection, no sharps.

## 2020-09-30 ENCOUNTER — PATIENT MESSAGE (OUTPATIENT)
Dept: NEUROLOGY | Facility: CLINIC | Age: 55
End: 2020-09-30

## 2020-10-12 ENCOUNTER — PATIENT MESSAGE (OUTPATIENT)
Dept: NEUROLOGY | Facility: CLINIC | Age: 55
End: 2020-10-12

## 2020-10-12 ENCOUNTER — TELEPHONE (OUTPATIENT)
Dept: NEUROLOGY | Facility: CLINIC | Age: 55
End: 2020-10-12

## 2020-10-12 NOTE — TELEPHONE ENCOUNTER
Called patient about issue. Informed her that as far as the authorization it was approved. She stated that the coupon that made it cost her nothing apparently has  because when she put in estimate on website that it showed her having an OOP cost of nearly 1000.00. And if this is the case she would need to cancel. She stated that someone in this office completed everything for her and she did not know. Informed patient that I and the other nurse are new and are not aware of what they did. Contacted Alba LANGE at [facility] and she is looking into it and will contact the patient.

## 2020-10-12 NOTE — TELEPHONE ENCOUNTER
----- Message from Chanel Clarke sent at 10/12/2020  8:17 AM CDT -----  Regarding: advice  Contact: patient  Type: Needs Medical Advice  Who Called:  patient  Symptoms (please be specific):    How long has patient had these symptoms:    Pharmacy name and phone #:    Best Call Back Number: [phone] (home)   Additional Information: Patient would like to make sure a coupon is still in effect for her Botox. She states it had to be re filed with her current insurance.  She is scheduled for tomorrow. Please call patient to advise. Thanks!

## 2020-10-13 ENCOUNTER — PATIENT MESSAGE (OUTPATIENT)
Dept: NEUROLOGY | Facility: CLINIC | Age: 55
End: 2020-10-13

## 2020-10-20 ENCOUNTER — TELEPHONE (OUTPATIENT)
Dept: PHARMACY | Facility: CLINIC | Age: 55
End: 2020-10-20

## 2020-10-22 ENCOUNTER — SPECIALTY PHARMACY (OUTPATIENT)
Dept: PHARMACY | Facility: CLINIC | Age: 55
End: 2020-10-22

## 2020-10-22 NOTE — TELEPHONE ENCOUNTER
Specialty Pharmacy - Refill Coordination    Specialty Medication Orders Linked to Encounter      Most Recent Value   Medication #1  galcanezumab-gnlm (EMGALITY PEN) 120 mg/mL PnIj (Order#[id number], Rx#[id number])          Refill Questions - Documented Responses      Most Recent Value   Relationship to patient of person spoken to?  Self   HIPAA/medical authority confirmed?  Yes   Any changes in contact preferences or allowed representatives?  No   Has the patient had any insurance changes?  No   Has the patient had any changes to specialty medication, dose, or instructions?  No   Has the patient started taking any new medications, herbals, or supplements?  No   Has the patient been diagnosed with any new medical conditions?  No   Does the patient have any new allergies to medications or foods?  No   Does the patient have any concerns about side effects?  No   Can the patient store medication/sharps container properly (at the correct temperature, away from children/pets, etc.)?  Yes   Can the patient call emergency services (911) in the event of an emergency?  Yes   Does the patient have any concerns or questions about taking or administering this medication as prescribed?  No   How many doses did the patient miss in the past 4 weeks or since the last fill?  0   How many doses does the patient have on hand?  0   How many days does the patient report on hand quantity will last?  0   Does the number of doses/days supply remaining match pharmacy expected amounts?  Yes   How will the patient receive the medication?  Mail   When does the patient need to receive the medication?  10/23/20   Shipping Address  Home   Address in [facility] confirmed and updated if neccessary?  Yes   Expected Copay ($)  0   Is the patient able to afford the medication copay?  Yes   Payment Method  zero copay   Days supply of Refill  28   Would patient like to speak to a pharmacist?  No   Do you want to trigger an intervention?  No   Do  you want to trigger an additional referral task?  No   Refill activity completed?  Yes   Refill activity plan  Refill scheduled   Shipment/Pickup Date:  10/22/20          Current Outpatient Medications   Medication Sig    acetaZOLAMIDE (DIAMOX) 250 MG tablet Take 1 tablet (250 mg total) by mouth 3 (three) times daily. (Patient not taking: Reported on 2/7/2020)    DEXLANSOPRAZOLE (DEXILANT ORAL) Take by mouth every morning.     eletriptan (RELPAX) 40 MG tablet TAKE 1 TABLET BY MOUTH AS NEEDED. MAY REPEAT IN 2 HOURS IF NECESSARY    galcanezumab-gnlm (EMGALITY PEN) 120 mg/mL PnIj Inject 120 mg into the skin every 28 days.    lidocaine-prilocaine (EMLA) cream Apply topically as needed.    liothyronine (CYTOMEL) 5 MCG Tab     MINIVELLE 0.05 mg/24 hr     progesterone (PROMETRIUM) 100 MG capsule TK 1 C PO QHS    rimegepant (NURTEC) ODT 75 mg One dissolving tablet on the tongue daily as needed for migraine. No more than once per day.    spironolactone (ALDACTONE) 50 MG tablet     sumatriptan (IMITREX STATDOSE) 6 mg/0.5 mL kit INJ 0.5 ML SC D PRN    SYNTHROID 50 mcg tablet TK 1 T PO  QAM    VENTOLIN HFA 90 mcg/actuation inhaler     ZOMIG 5 mg [place] USE 1 SPRAY NASALLY ONCE AS NEEDED   Last reviewed on 10/22/2020  2:25 PM by Mirta Moses    Review of patient's allergies indicates:   Allergen Reactions    Cephalosporins Anaphylaxis    Penicillins Anaphylaxis    Codeine Itching and Swelling    Last reviewed on  10/22/2020 2:25 PM by Mirta Moses      Tasks added this encounter   No tasks added.   Tasks due within next 3 months   10/16/2020 - Refill Call     Mirta Moses  [facility]  [address] [place] [id number]  Phone: [phone]  Fax: [phone]

## 2020-11-18 ENCOUNTER — SPECIALTY PHARMACY (OUTPATIENT)
Dept: PHARMACY | Facility: CLINIC | Age: 55
End: 2020-11-18

## 2020-11-18 DIAGNOSIS — G43.719 INTRACTABLE CHRONIC MIGRAINE WITHOUT AURA AND WITHOUT STATUS MIGRAINOSUS: ICD-10-CM

## 2020-11-18 RX ORDER — GALCANEZUMAB 120 MG/ML
120 INJECTION, SOLUTION SUBCUTANEOUS
Qty: 1 ML | Refills: 5 | Status: SHIPPED | OUTPATIENT
Start: 2020-11-18 | End: 2021-06-04 | Stop reason: SDUPTHER

## 2020-11-18 NOTE — TELEPHONE ENCOUNTER
11/18 WWB   Refill attempt for emgality, request was sent to MIKI CRAMER and will follow up to schedule.              Current Outpatient Medications   Medication Sig    acetaZOLAMIDE (DIAMOX) 250 MG tablet Take 1 tablet (250 mg total) by mouth 3 (three) times daily. (Patient not taking: Reported on 2/7/2020)    DEXLANSOPRAZOLE (DEXILANT ORAL) Take by mouth every morning.     eletriptan (RELPAX) 40 MG tablet TAKE 1 TABLET BY MOUTH AS NEEDED. MAY REPEAT IN 2 HOURS IF NECESSARY    galcanezumab-gnlm (EMGALITY PEN) 120 mg/mL PnIj Inject 120 mg into the skin every 28 days.    lidocaine-prilocaine (EMLA) cream Apply topically as needed.    liothyronine (CYTOMEL) 5 MCG Tab     MINIVELLE 0.05 mg/24 hr     progesterone (PROMETRIUM) 100 MG capsule TK 1 C PO QHS    rimegepant (NURTEC) ODT 75 mg One dissolving tablet on the tongue daily as needed for migraine. No more than once per day.    spironolactone (ALDACTONE) 50 MG tablet     sumatriptan (IMITREX STATDOSE) 6 mg/0.5 mL kit INJ 0.5 ML SC D PRN    SYNTHROID 50 mcg tablet TK 1 T PO  QAM    VENTOLIN HFA 90 mcg/actuation inhaler     ZOMIG 5 mg [place] USE 1 SPRAY NASALLY ONCE AS NEEDED   Last reviewed on 10/22/2020  2:25 PM by Mirta Moses    Review of patient's allergies indicates:   Allergen Reactions    Cephalosporins Anaphylaxis    Penicillins Anaphylaxis    Codeine Itching and Swelling    Last reviewed on  10/22/2020 2:25 PM by Mirta Moses      Tasks added this encounter   No tasks added.   Tasks due within next 3 months   11/20/2020 - Refill Call (Auto Added)   11/18 WWB   Refill attempt for emgality, request was sent to MIKI CRAMER and will follow up to schedule.    Edna CernaChayito  [facility] - Special[facility] Pharm[facility]  [address] [place] [id number]  Phone: [phone]  Fax: [phone]

## 2020-11-20 ENCOUNTER — SPECIALTY PHARMACY (OUTPATIENT)
Dept: PHARMACY | Facility: CLINIC | Age: 55
End: 2020-11-20

## 2020-11-20 NOTE — TELEPHONE ENCOUNTER
Specialty Pharmacy - Refill Coordination    Specialty Medication Orders Linked to Encounter      Most Recent Value   Medication #1  galcanezumab-gnlm (EMGALITY PEN) 120 mg/mL PnIj (Order#[id number], Rx#[id number])          Refill Questions - Documented Responses      Most Recent Value   Relationship to patient of person spoken to?  Self   HIPAA/medical authority confirmed?  Yes   Any changes in contact preferences or allowed representatives?  No   Has the patient had any insurance changes?  No   Can the patient store medication/sharps container properly (at the correct temperature, away from children/pets, etc.)?  Yes   Can the patient call emergency services (911) in the event of an emergency?  Yes   Does the patient have any concerns or questions about taking or administering this medication as prescribed?  No   How many doses did the patient miss in the past 4 weeks or since the last fill?  0   How many doses does the patient have on hand?  0   How many days does the patient report on hand quantity will last?  4   Does the number of doses/days supply remaining match pharmacy expected amounts?  Yes   Does the patient feel that this medication is effective?  Yes   During the past 4 weeks, has patient missed any activities due to condition or medication?  No   During the past 4 weeks, did patient have any of the following urgent care visits?  None   How will the patient receive the medication?  Mail   When does the patient need to receive the medication?  11/24/20   Shipping Address  Home   Address in [facility] confirmed and updated if neccessary?  Yes   Expected Copay ($)  0   Is the patient able to afford the medication copay?  Yes   Payment Method  zero copay   Days supply of Refill  28   Would patient like to speak to a pharmacist?  No   Do you want to trigger an intervention?  No   Do you want to trigger an additional referral task?  No   Refill activity completed?  Yes   Refill activity plan  Refill  scheduled   Shipment/Pickup Date:  11/23/20          Current Outpatient Medications   Medication Sig    acetaZOLAMIDE (DIAMOX) 250 MG tablet Take 1 tablet (250 mg total) by mouth 3 (three) times daily. (Patient not taking: Reported on 2/7/2020)    DEXLANSOPRAZOLE (DEXILANT ORAL) Take by mouth every morning.     eletriptan (RELPAX) 40 MG tablet TAKE 1 TABLET BY MOUTH AS NEEDED. MAY REPEAT IN 2 HOURS IF NECESSARY    galcanezumab-gnlm (EMGALITY PEN) 120 mg/mL PnIj Inject 120 mg into the skin every 28 days.    lidocaine-prilocaine (EMLA) cream Apply topically as needed.    liothyronine (CYTOMEL) 5 MCG Tab     MINIVELLE 0.05 mg/24 hr     progesterone (PROMETRIUM) 100 MG capsule TK 1 C PO QHS    rimegepant (NURTEC) ODT 75 mg One dissolving tablet on the tongue daily as needed for migraine. No more than once per day.    spironolactone (ALDACTONE) 50 MG tablet     sumatriptan (IMITREX STATDOSE) 6 mg/0.5 mL kit INJ 0.5 ML SC D PRN    SYNTHROID 50 mcg tablet TK 1 T PO  QAM    VENTOLIN HFA 90 mcg/actuation inhaler     ZOMIG 5 mg [place] USE 1 SPRAY NASALLY ONCE AS NEEDED   Last reviewed on 10/22/2020  2:25 PM by Mirta Moses    Review of patient's allergies indicates:   Allergen Reactions    Cephalosporins Anaphylaxis    Penicillins Anaphylaxis    Codeine Itching and Swelling    Last reviewed on  10/22/2020 2:25 PM by Mirta Moses      Tasks added this encounter   No tasks added.   Tasks due within next 3 months   11/20/2020 - Refill Call (Auto Added)     EZEQUIEL CAMARILLO  [facility] - [facility] Pharm[facility]  [address] [place] [id number]  Phone: [phone]  Fax: [phone]

## 2020-11-24 ENCOUNTER — OFFICE VISIT (OUTPATIENT)
Dept: DERMATOLOGY | Facility: CLINIC | Age: 55
End: 2020-11-24
Payer: COMMERCIAL

## 2020-11-24 VITALS — WEIGHT: 153.44 LBS | HEIGHT: 64 IN | BODY MASS INDEX: 26.2 KG/M2

## 2020-11-24 DIAGNOSIS — L57.0 ACTINIC KERATOSIS: ICD-10-CM

## 2020-11-24 DIAGNOSIS — L81.4 LENTIGINES: Primary | ICD-10-CM

## 2020-11-24 DIAGNOSIS — D22.9 MULTIPLE BENIGN NEVI: ICD-10-CM

## 2020-11-24 DIAGNOSIS — L82.1 SEBORRHEIC KERATOSES: ICD-10-CM

## 2020-11-24 DIAGNOSIS — D18.01 CHERRY ANGIOMA: ICD-10-CM

## 2020-11-24 DIAGNOSIS — B07.8 COMMON WART: ICD-10-CM

## 2020-11-24 DIAGNOSIS — D48.5 NEOPLASM OF UNCERTAIN BEHAVIOR OF SKIN: ICD-10-CM

## 2020-11-24 PROCEDURE — 88305 TISSUE EXAM BY PATHOLOGIST: CPT | Performed by: PATHOLOGY

## 2020-11-24 PROCEDURE — 17000 PR DESTRUCTION(LASER SURGERY,CRYOSURGERY,CHEMOSURGERY),PREMALIGNANT LESIONS,FIRST LESION: ICD-10-PCS | Mod: 59,S$GLB,, | Performed by: DERMATOLOGY

## 2020-11-24 PROCEDURE — 17110 PR DESTRUCTION BENIGN LESIONS UP TO 14: ICD-10-PCS | Mod: 59,S$GLB,, | Performed by: DERMATOLOGY

## 2020-11-24 PROCEDURE — 1126F AMNT PAIN NOTED NONE PRSNT: CPT | Mod: S$GLB,,, | Performed by: DERMATOLOGY

## 2020-11-24 PROCEDURE — 17110 DESTRUCTION B9 LES UP TO 14: CPT | Mod: 59,S$GLB,, | Performed by: DERMATOLOGY

## 2020-11-24 PROCEDURE — 11102 TANGNTL BX SKIN SINGLE LES: CPT | Mod: S$GLB,,, | Performed by: DERMATOLOGY

## 2020-11-24 PROCEDURE — 99999 PR PBB SHADOW E&M-EST. PATIENT-LVL III: CPT | Mod: PBBFAC,,, | Performed by: DERMATOLOGY

## 2020-11-24 PROCEDURE — 88305 TISSUE EXAM BY PATHOLOGIST: ICD-10-PCS | Mod: 26,,, | Performed by: PATHOLOGY

## 2020-11-24 PROCEDURE — 99203 OFFICE O/P NEW LOW 30 MIN: CPT | Mod: 25,S$GLB,, | Performed by: DERMATOLOGY

## 2020-11-24 PROCEDURE — 3008F BODY MASS INDEX DOCD: CPT | Mod: CPTII,S$GLB,, | Performed by: DERMATOLOGY

## 2020-11-24 PROCEDURE — 11102 PR TANGENTIAL BIOPSY, SKIN, SINGLE LESION: ICD-10-PCS | Mod: S$GLB,,, | Performed by: DERMATOLOGY

## 2020-11-24 PROCEDURE — 88305 TISSUE EXAM BY PATHOLOGIST: CPT | Mod: 26,,, | Performed by: PATHOLOGY

## 2020-11-24 PROCEDURE — 17000 DESTRUCT PREMALG LESION: CPT | Mod: 59,S$GLB,, | Performed by: DERMATOLOGY

## 2020-11-24 PROCEDURE — 1126F PR PAIN SEVERITY QUANTIFIED, NO PAIN PRESENT: ICD-10-PCS | Mod: S$GLB,,, | Performed by: DERMATOLOGY

## 2020-11-24 PROCEDURE — 99999 PR PBB SHADOW E&M-EST. PATIENT-LVL III: ICD-10-PCS | Mod: PBBFAC,,, | Performed by: DERMATOLOGY

## 2020-11-24 PROCEDURE — 3008F PR BODY MASS INDEX (BMI) DOCUMENTED: ICD-10-PCS | Mod: CPTII,S$GLB,, | Performed by: DERMATOLOGY

## 2020-11-24 PROCEDURE — 99203 PR OFFICE/OUTPT VISIT, NEW, LEVL III, 30-44 MIN: ICD-10-PCS | Mod: 25,S$GLB,, | Performed by: DERMATOLOGY

## 2020-11-24 RX ORDER — LEVOTHYROXINE, LIOTHYRONINE 19; 4.5 UG/1; UG/1
TABLET ORAL
COMMUNITY
Start: 2020-10-24 | End: 2021-11-09 | Stop reason: DRUGHIGH

## 2020-11-24 RX ORDER — LEVOTHYROXINE, LIOTHYRONINE 9.5; 2.25 UG/1; UG/1
TABLET ORAL
COMMUNITY
Start: 2020-11-01 | End: 2021-11-09 | Stop reason: DRUGHIGH

## 2020-11-24 NOTE — PROGRESS NOTES
"  Subjective:       Patient ID:  Mayra Smart is a 55 y.o. female who presents for   Chief Complaint   Patient presents with    Skin Check     55 yr old F presents for initial visit and full body skin check. She noticed a few rough spots on her forehead and a few warts on the bottoms of her feet that are tender. No prior treatments. She has a strong family history of skin cancers.       no Phx of NMSC.  yes Fhx of melanoma- Mother and Father    Social Hx: runs [employer]     Past Medical History:   Diagnosis Date    Back pain     Bell's palsy     Breast disorder     fibercystic tissue    Fibroids     uterine    GERD (gastroesophageal reflux disease)     History of anesthesia reaction     sensitive to anesthesia, " Major hangover"    Neck pain     Pelvic pain in female     left lower quadrant    Preeclampsia           Review of Systems   Constitutional: Negative for fever, chills and fatigue.   Respiratory: Negative for cough and shortness of breath.    Skin: Positive for daily sunscreen use and activity-related sunscreen use. Negative for itching, rash, dry skin and wears hat.   Hematologic/Lymphatic: Does not bruise/bleed easily.        Objective:    Physical Exam   Constitutional: She appears well-developed and well-nourished. No distress.   Neurological: She is alert and oriented to person, place, and time. She is not disoriented.   Psychiatric: She has a normal mood and affect.   Skin:   Areas Examined (abnormalities noted in diagram):   Scalp / Hair Palpated and Inspected  Head / Face Inspection Performed  Neck Inspection Performed  Chest / Axilla Inspection Performed  Abdomen Inspection Performed  Genitals / Buttocks / Groin Inspection Performed  Back Inspection Performed  RUE Inspected  LUE Inspection Performed  RLE Inspected  LLE Inspection Performed  Nails and Digits Inspection Performed                           Diagram Legend     Erythematous scaling macule/papule c/w actinic " keratosis       Vascular papule c/w angioma      Pigmented verrucoid papule/plaque c/w seborrheic keratosis      Yellow umbilicated papule c/w sebaceous hyperplasia      Irregularly shaped tan macule c/w lentigo     1-2 mm smooth white papules consistent with Milia      Movable subcutaneous cyst with punctum c/w epidermal inclusion cyst      Subcutaneous movable cyst c/w pilar cyst      Firm pink to brown papule c/w dermatofibroma      Pedunculated fleshy papule(s) c/w skin tag(s)      Evenly pigmented macule c/w junctional nevus     Mildly variegated pigmented, slightly irregular-bordered macule c/w mildly atypical nevus      Flesh colored to evenly pigmented papule c/w intradermal nevus       Pink pearly papule/plaque c/w basal cell carcinoma      Erythematous hyperkeratotic cursted plaque c/w SCC      Surgical scar with no sign of skin cancer recurrence      Open and closed comedones      Inflammatory papules and pustules      Verrucoid papule consistent consistent with wart     Erythematous eczematous patches and plaques     Dystrophic onycholytic nail with subungual debris c/w onychomycosis     Umbilicated papule    Erythematous-base heme-crusted tan verrucoid plaque consistent with inflamed seborrheic keratosis     Erythematous Silvery Scaling Plaque c/w Psoriasis     See annotation      Assessment / Plan:      Pathology Orders:     Normal Orders This Visit    Specimen to Pathology, Dermatology     Questions:    Procedure Type: Dermatology and skin neoplasms    Number of Specimens: 1    ------------------------: -------------------------    Spec 1 Procedure: Biopsy    Spec 1 Clinical Impression: r/o atypical nevus    Spec 1 Source: L thigh        Lentigines  These are benign hyperpigmented sun induced lesions. Daily sun protection will reduce the number of new lesions    Multiple benign nevi  Reassurance that her nevi appear benign with regular and consistent pigment pattern on dermoscopy    Cherry  angioma  This is a benign vascular lesion. Reassurance given. No treatment required.     Seborrheic keratoses  These are benign inherited growths without a malignant potential. Reassurance given to patient. No treatment is necessary.     Common wart  Cryosurgery procedure note:    Verbal consent from the patient is obtained. Liquid nitrogen cryosurgery is applied to 3 verruca, as detailed in the physical exam, to produce a freeze injury. 3 consecutive freeze thaw cycles are applied to each verruca. The patient is aware that blisters (possibly blood blisters) may form.    Actinic keratosis  Cryosurgery Procedure Note    Verbal consent from the patient is obtained and the patient is aware of the precancerous quality and need for treatment of these lesions. Liquid nitrogen cryosurgery is applied to the 2 actinic keratoses, as detailed in the physical exam, to produce a freeze injury. The patient is aware that blisters may form and is instructed on wound care with gentle cleansing and use of vaseline ointment to keep moist until healed. The patient is instructed to call if lesions do not completely resolve.    Neoplasm of uncertain behavior of skin  -     Specimen to Pathology, Dermatology  Shave biopsy procedure note:    Shave biopsy performed after verbal consent including risk of infection, scar, recurrence, need for additional treatment of site. Area prepped with alcohol, anesthetized with approximately 1.0cc of 1% lidocaine with epinephrine. Lesional tissue shaved with razor blade. Hemostasis achieved with application of aluminum chloride followed by hyfrecation. No complications. Dressing applied. Wound care explained.           Follow up for pending Pathology.

## 2020-11-30 LAB
FINAL PATHOLOGIC DIAGNOSIS: NORMAL
GROSS: NORMAL
MICROSCOPIC EXAM: NORMAL

## 2020-12-08 ENCOUNTER — TELEPHONE (OUTPATIENT)
Dept: DERMATOLOGY | Facility: CLINIC | Age: 55
End: 2020-12-08

## 2020-12-08 NOTE — TELEPHONE ENCOUNTER
Pt contacted with result .       ----- Message from Desi Manzo MD sent at 12/8/2020  9:14 AM CST -----  Skin, left thigh, shave biopsy:   - MELANOCYTIC NEVUS, COMPOUND TYPE, TRAUMATIZED  Benign mole  No further action necessary  PRINCESS

## 2020-12-16 ENCOUNTER — SPECIALTY PHARMACY (OUTPATIENT)
Dept: PHARMACY | Facility: CLINIC | Age: 55
End: 2020-12-16

## 2020-12-16 NOTE — TELEPHONE ENCOUNTER
12/16 WWB  Refill attempt for emgality needs a prior authorization. Will pend accordingly and escalated to the pharmacist. Clint.

## 2021-01-27 ENCOUNTER — OFFICE VISIT (OUTPATIENT)
Dept: ALLERGY | Facility: CLINIC | Age: 56
End: 2021-01-27
Payer: COMMERCIAL

## 2021-01-27 ENCOUNTER — LAB VISIT (OUTPATIENT)
Dept: LAB | Facility: HOSPITAL | Age: 56
End: 2021-01-27
Attending: ALLERGY & IMMUNOLOGY
Payer: COMMERCIAL

## 2021-01-27 VITALS — OXYGEN SATURATION: 99 % | BODY MASS INDEX: 26.57 KG/M2 | WEIGHT: 155.63 LBS | HEART RATE: 103 BPM | HEIGHT: 64 IN

## 2021-01-27 DIAGNOSIS — J31.0 CHRONIC RHINITIS: Primary | ICD-10-CM

## 2021-01-27 DIAGNOSIS — Z91.038 ALLERGY TO INSECT STINGS: ICD-10-CM

## 2021-01-27 DIAGNOSIS — Z91.011 MILK ALLERGY: ICD-10-CM

## 2021-01-27 DIAGNOSIS — J31.0 CHRONIC RHINITIS: ICD-10-CM

## 2021-01-27 DIAGNOSIS — J45.20 MILD INTERMITTENT ASTHMA WITHOUT COMPLICATION: ICD-10-CM

## 2021-01-27 PROCEDURE — 99999 PR PBB SHADOW E&M-EST. PATIENT-LVL IV: ICD-10-PCS | Mod: PBBFAC,,, | Performed by: ALLERGY & IMMUNOLOGY

## 2021-01-27 PROCEDURE — 99999 PR PBB SHADOW E&M-EST. PATIENT-LVL IV: CPT | Mod: PBBFAC,,, | Performed by: ALLERGY & IMMUNOLOGY

## 2021-01-27 PROCEDURE — 86003 ALLG SPEC IGE CRUDE XTRC EA: CPT

## 2021-01-27 PROCEDURE — 3008F PR BODY MASS INDEX (BMI) DOCUMENTED: ICD-10-PCS | Mod: CPTII,S$GLB,, | Performed by: ALLERGY & IMMUNOLOGY

## 2021-01-27 PROCEDURE — 99204 PR OFFICE/OUTPT VISIT, NEW, LEVL IV, 45-59 MIN: ICD-10-PCS | Mod: S$GLB,,, | Performed by: ALLERGY & IMMUNOLOGY

## 2021-01-27 PROCEDURE — 36415 COLL VENOUS BLD VENIPUNCTURE: CPT | Mod: PO

## 2021-01-27 PROCEDURE — 86003 ALLG SPEC IGE CRUDE XTRC EA: CPT | Mod: 59

## 2021-01-27 PROCEDURE — 99204 OFFICE O/P NEW MOD 45 MIN: CPT | Mod: S$GLB,,, | Performed by: ALLERGY & IMMUNOLOGY

## 2021-01-27 PROCEDURE — 3008F BODY MASS INDEX DOCD: CPT | Mod: CPTII,S$GLB,, | Performed by: ALLERGY & IMMUNOLOGY

## 2021-01-27 RX ORDER — EPINEPHRINE 0.3 MG/.3ML
1 INJECTION SUBCUTANEOUS ONCE AS NEEDED
Qty: 2 DEVICE | Refills: 3 | Status: SHIPPED | OUTPATIENT
Start: 2021-01-27 | End: 2024-03-12

## 2021-01-29 LAB
A ALTERNATA IGE QN: 0.17 KU/L
A FUMIGATUS IGE QN: <0.1 KU/L
ALLERGEN CHAETOMIUM GLOBOSUM IGE: <0.1 KU/L
ALLERGEN LATEX IGE: <0.1 KU/L
ALLERGEN WALNUT TREE IGE: <0.1 KU/L
ALLERGEN WHITE PINE TREE IGE: <0.1 KU/L
B CINEREA IGE QN: <0.1 KU/L
BAHIA GRASS IGE QN: 0.25 KU/L
BALD CYPRESS IGE QN: <0.1 KU/L
BERMUDA GRASS IGE QN: 0.11 KU/L
C HERBARUM IGE QN: <0.1 KU/L
C LUNATA IGE QN: <0.1 KU/L
CAT DANDER IGE QN: 0.44 KU/L
CHAETOMIUM GLOB. CLASS: NORMAL
COMMON RAGWEED IGE QN: <0.1 KU/L
COTTONWOOD IGE QN: <0.1 KU/L
COW DANDER IGE QN: 0.29 KU/L
COW MILK IGE QN: <0.1 KU/L
D FARINAE IGE QN: 5.77 KU/L
D PTERONYSS IGE QN: 5.75 KU/L
DEPRECATED A ALTERNATA IGE RAST QL: ABNORMAL
DEPRECATED A FUMIGATUS IGE RAST QL: NORMAL
DEPRECATED B CINEREA IGE RAST QL: NORMAL
DEPRECATED BAHIA GRASS IGE RAST QL: ABNORMAL
DEPRECATED BALD CYPRESS IGE RAST QL: NORMAL
DEPRECATED BERMUDA GRASS IGE RAST QL: ABNORMAL
DEPRECATED C HERBARUM IGE RAST QL: NORMAL
DEPRECATED C LUNATA IGE RAST QL: NORMAL
DEPRECATED CAT DANDER IGE RAST QL: ABNORMAL
DEPRECATED COMMON RAGWEED IGE RAST QL: NORMAL
DEPRECATED COTTONWOOD IGE RAST QL: NORMAL
DEPRECATED COW DANDER IGE RAST QL: ABNORMAL
DEPRECATED COW MILK IGE RAST QL: NORMAL
DEPRECATED D FARINAE IGE RAST QL: ABNORMAL
DEPRECATED D PTERONYSS IGE RAST QL: ABNORMAL
DEPRECATED DOG DANDER IGE RAST QL: ABNORMAL
DEPRECATED ELDER IGE RAST QL: NORMAL
DEPRECATED ENGL PLANTAIN IGE RAST QL: NORMAL
DEPRECATED GUINEA PIG EPITH IGE RAST QL: ABNORMAL
DEPRECATED HONEY BEE IGE RAST QL: ABNORMAL
DEPRECATED HORSE DANDER IGE RAST QL: ABNORMAL
DEPRECATED JOHNSON GRASS IGE RAST QL: ABNORMAL
DEPRECATED LONDON PLANE IGE RAST QL: NORMAL
DEPRECATED MUGWORT IGE RAST QL: NORMAL
DEPRECATED P BETAE IGE RAST QL: NORMAL
DEPRECATED P NOTATUM IGE RAST QL: NORMAL
DEPRECATED PAPER WASP IGE RAST QL: ABNORMAL
DEPRECATED PECAN/HICK TREE IGE RAST QL: NORMAL
DEPRECATED RABBIT EPITH IGE RAST QL: ABNORMAL
DEPRECATED RED IMP FIRE ANT IGE RAST QL: ABNORMAL
DEPRECATED ROACH IGE RAST QL: ABNORMAL
DEPRECATED S ROSTRATA IGE RAST QL: NORMAL
DEPRECATED SALTWORT IGE RAST QL: ABNORMAL
DEPRECATED SILVER BIRCH IGE RAST QL: NORMAL
DEPRECATED TIMOTHY IGE RAST QL: ABNORMAL
DEPRECATED WEST RAGWEED IGE RAST QL: ABNORMAL
DEPRECATED WHITE OAK IGE RAST QL: NORMAL
DEPRECATED WHITEFACED HORNET IGE RAST QL: ABNORMAL
DEPRECATED WILLOW IGE RAST QL: NORMAL
DEPRECATED YELLOW HORNET IGE RAST QL: ABNORMAL
DEPRECATED YELLOW JACKET IGE RAST QL: ABNORMAL
DOG DANDER IGE QN: 0.41 KU/L
ELDER IGE QN: <0.1 KU/L
ENGL PLANTAIN IGE QN: <0.1 KU/L
GUINEA PIG EPITH IGE QN: 1.44 KU/L
HONEY BEE IGE QN: 0.23 KU/L
HORSE DANDER IGE QN: 0.49 KU/L
JOHNSON GRASS IGE QN: 0.19 KU/L
LATEX CLASS: NORMAL
LONDON PLANE IGE QN: <0.1 KU/L
MUGWORT IGE QN: <0.1 KU/L
P BETAE IGE QN: <0.1 KU/L
P NOTATUM IGE QN: <0.1 KU/L
PAPER WASP IGE QN: 1.73 KU/L
PECAN/HICK TREE IGE QN: <0.1 KU/L
RABBIT EPITH IGE QN: 2.26 KU/L
RED IMP FIRE ANT IGE QN: 0.64 KU/L
ROACH IGE QN: 0.19 KU/L
S ROSTRATA IGE QN: <0.1 KU/L
SALTWORT IGE QN: 0.11 KU/L
SILVER BIRCH IGE QN: <0.1 KU/L
TIMOTHY IGE QN: 0.71 KU/L
WALNUT TREE CLASS: NORMAL
WEST RAGWEED IGE QN: 0.11 KU/L
WHITE OAK IGE QN: <0.1 KU/L
WHITE PINE CLASS: NORMAL
WHITEFACED HORNET IGE QN: 0.28 KU/L
WILLOW IGE QN: <0.1 KU/L
YELLOW HORNET IGE QN: 0.23 KU/L
YELLOW JACKET IGE QN: 0.3 KU/L

## 2021-01-30 LAB
DEPRECATED GERBIL EPITH IGE RAST QL: 0
GERBIL EPITH IGE QN: <0.1 KU/L

## 2021-02-03 ENCOUNTER — PATIENT MESSAGE (OUTPATIENT)
Dept: ALLERGY | Facility: CLINIC | Age: 56
End: 2021-02-03

## 2021-03-23 ENCOUNTER — PATIENT MESSAGE (OUTPATIENT)
Dept: NEUROLOGY | Facility: CLINIC | Age: 56
End: 2021-03-23

## 2021-03-23 ENCOUNTER — TELEPHONE (OUTPATIENT)
Dept: NEUROLOGY | Facility: CLINIC | Age: 56
End: 2021-03-23

## 2021-03-25 ENCOUNTER — HOSPITAL ENCOUNTER (OUTPATIENT)
Dept: RADIOLOGY | Facility: HOSPITAL | Age: 56
Discharge: HOME | End: 2021-03-25
Attending: OBSTETRICS & GYNECOLOGY
Payer: COMMERCIAL

## 2021-03-25 DIAGNOSIS — Z12.31 ENCOUNTER FOR SCREENING MAMMOGRAM FOR MALIGNANT NEOPLASM OF BREAST: ICD-10-CM

## 2021-03-25 PROCEDURE — 77063 BREAST TOMOSYNTHESIS BI: CPT | Mod: 26,,, | Performed by: RADIOLOGY

## 2021-03-25 PROCEDURE — 77063 MAMMO DIGITAL SCREENING BILAT WITH TOMO: ICD-10-PCS | Mod: 26,,, | Performed by: RADIOLOGY

## 2021-03-25 PROCEDURE — 77067 MAMMO DIGITAL SCREENING BILAT WITH TOMO: ICD-10-PCS | Mod: 26,,, | Performed by: RADIOLOGY

## 2021-03-25 PROCEDURE — 77067 SCR MAMMO BI INCL CAD: CPT | Mod: TC,PO

## 2021-03-25 PROCEDURE — 77067 SCR MAMMO BI INCL CAD: CPT | Mod: 26,,, | Performed by: RADIOLOGY

## 2021-03-28 ENCOUNTER — PATIENT MESSAGE (OUTPATIENT)
Dept: NEUROLOGY | Facility: CLINIC | Age: 56
End: 2021-03-28

## 2021-04-06 ENCOUNTER — HOSPITAL ENCOUNTER (OUTPATIENT)
Dept: RADIOLOGY | Facility: HOSPITAL | Age: 56
Discharge: HOME | End: 2021-04-06
Payer: COMMERCIAL

## 2021-04-06 DIAGNOSIS — E04.1 NONTOXIC UNINODULAR GOITER: ICD-10-CM

## 2021-04-06 DIAGNOSIS — N80.A0: ICD-10-CM

## 2021-04-06 DIAGNOSIS — N23 BILATERAL RENAL COLIC: ICD-10-CM

## 2021-04-06 DIAGNOSIS — R10.32 ABDOMINAL PAIN, LEFT LOWER QUADRANT: ICD-10-CM

## 2021-04-06 PROCEDURE — 76536 US EXAM OF HEAD AND NECK: CPT | Mod: 26,,, | Performed by: RADIOLOGY

## 2021-04-06 PROCEDURE — 76700 US EXAM ABDOM COMPLETE: CPT | Mod: 26,,, | Performed by: RADIOLOGY

## 2021-04-06 PROCEDURE — 76700 US ABDOMEN COMPLETE: ICD-10-PCS | Mod: 26,,, | Performed by: RADIOLOGY

## 2021-04-06 PROCEDURE — 76536 US THYROID: ICD-10-PCS | Mod: 26,,, | Performed by: RADIOLOGY

## 2021-04-06 PROCEDURE — 76536 US EXAM OF HEAD AND NECK: CPT | Mod: TC,PO

## 2021-04-06 PROCEDURE — 76700 US EXAM ABDOM COMPLETE: CPT | Mod: TC,PO

## 2021-04-21 ENCOUNTER — PATIENT MESSAGE (OUTPATIENT)
Dept: NEUROLOGY | Facility: CLINIC | Age: 56
End: 2021-04-21

## 2021-04-28 ENCOUNTER — PROCEDURE VISIT (OUTPATIENT)
Dept: NEUROLOGY | Facility: CLINIC | Age: 56
End: 2021-04-28
Payer: COMMERCIAL

## 2021-04-28 VITALS
HEIGHT: 64 IN | RESPIRATION RATE: 17 BRPM | HEART RATE: 81 BPM | TEMPERATURE: 98 F | BODY MASS INDEX: 25.95 KG/M2 | WEIGHT: 152 LBS | SYSTOLIC BLOOD PRESSURE: 141 MMHG | DIASTOLIC BLOOD PRESSURE: 92 MMHG

## 2021-04-28 DIAGNOSIS — G43.719 INTRACTABLE CHRONIC MIGRAINE WITHOUT AURA AND WITHOUT STATUS MIGRAINOSUS: Primary | ICD-10-CM

## 2021-04-28 PROCEDURE — 64615 CHEMODENERV MUSC MIGRAINE: CPT | Mod: S$GLB,,, | Performed by: PSYCHIATRY & NEUROLOGY

## 2021-04-28 PROCEDURE — 64615 PR CHEMODENERVATION OF MUSCLE FOR CHRONIC MIGRAINE: ICD-10-PCS | Mod: S$GLB,,, | Performed by: PSYCHIATRY & NEUROLOGY

## 2021-05-26 DIAGNOSIS — G43.719 INTRACTABLE CHRONIC MIGRAINE WITHOUT AURA AND WITHOUT STATUS MIGRAINOSUS: Primary | ICD-10-CM

## 2021-05-27 RX ORDER — ELETRIPTAN HYDROBROMIDE 40 MG/1
TABLET, FILM COATED ORAL
Qty: 12 TABLET | Refills: 11 | Status: SHIPPED | OUTPATIENT
Start: 2021-05-27 | End: 2021-07-29 | Stop reason: SDUPTHER

## 2021-06-04 DIAGNOSIS — G43.719 INTRACTABLE CHRONIC MIGRAINE WITHOUT AURA AND WITHOUT STATUS MIGRAINOSUS: ICD-10-CM

## 2021-06-04 RX ORDER — UBROGEPANT 50 MG/1
50 TABLET ORAL ONCE AS NEEDED
Qty: 10 TABLET | Refills: 2 | Status: CANCELLED | OUTPATIENT
Start: 2021-06-04 | End: 2021-06-04

## 2021-06-04 RX ORDER — GALCANEZUMAB 120 MG/ML
120 INJECTION, SOLUTION SUBCUTANEOUS
Qty: 1 ML | Refills: 11 | Status: SHIPPED | OUTPATIENT
Start: 2021-06-04 | End: 2021-11-09

## 2021-06-07 ENCOUNTER — TELEPHONE (OUTPATIENT)
Dept: PHARMACY | Facility: CLINIC | Age: 56
End: 2021-06-07

## 2021-07-23 ENCOUNTER — PROCEDURE VISIT (OUTPATIENT)
Dept: NEUROLOGY | Facility: CLINIC | Age: 56
End: 2021-07-23
Payer: COMMERCIAL

## 2021-07-23 VITALS
BODY MASS INDEX: 25.63 KG/M2 | RESPIRATION RATE: 17 BRPM | SYSTOLIC BLOOD PRESSURE: 146 MMHG | WEIGHT: 150.13 LBS | HEIGHT: 64 IN | HEART RATE: 82 BPM | DIASTOLIC BLOOD PRESSURE: 92 MMHG

## 2021-07-23 DIAGNOSIS — G43.719 INTRACTABLE CHRONIC MIGRAINE WITHOUT AURA AND WITHOUT STATUS MIGRAINOSUS: Primary | ICD-10-CM

## 2021-07-23 PROCEDURE — 64615 CHEMODENERV MUSC MIGRAINE: CPT | Mod: S$GLB,,, | Performed by: PSYCHIATRY & NEUROLOGY

## 2021-07-23 PROCEDURE — 64615 PR CHEMODENERVATION OF MUSCLE FOR CHRONIC MIGRAINE: ICD-10-PCS | Mod: S$GLB,,, | Performed by: PSYCHIATRY & NEUROLOGY

## 2021-07-27 DIAGNOSIS — G43.719 INTRACTABLE CHRONIC MIGRAINE WITHOUT AURA AND WITHOUT STATUS MIGRAINOSUS: ICD-10-CM

## 2021-07-27 RX ORDER — RIMEGEPANT SULFATE 75 MG/75MG
TABLET, ORALLY DISINTEGRATING ORAL
Qty: 8 TABLET | Refills: 11 | Status: SHIPPED | OUTPATIENT
Start: 2021-07-27 | End: 2021-07-29

## 2021-07-28 ENCOUNTER — PATIENT MESSAGE (OUTPATIENT)
Dept: NEUROLOGY | Facility: CLINIC | Age: 56
End: 2021-07-28

## 2021-07-28 DIAGNOSIS — G43.719 INTRACTABLE CHRONIC MIGRAINE WITHOUT AURA AND WITHOUT STATUS MIGRAINOSUS: Primary | ICD-10-CM

## 2021-07-28 RX ORDER — UBROGEPANT 50 MG/1
50 TABLET ORAL ONCE AS NEEDED
Qty: 10 TABLET | Refills: 2 | Status: SHIPPED | OUTPATIENT
Start: 2021-07-28 | End: 2021-08-06

## 2021-07-29 ENCOUNTER — TELEPHONE (OUTPATIENT)
Dept: PHARMACY | Facility: CLINIC | Age: 56
End: 2021-07-29

## 2021-07-29 ENCOUNTER — OFFICE VISIT (OUTPATIENT)
Dept: NEUROLOGY | Facility: CLINIC | Age: 56
End: 2021-07-29
Payer: COMMERCIAL

## 2021-07-29 DIAGNOSIS — G43.719 INTRACTABLE CHRONIC MIGRAINE WITHOUT AURA AND WITHOUT STATUS MIGRAINOSUS: ICD-10-CM

## 2021-07-29 PROCEDURE — 1160F RVW MEDS BY RX/DR IN RCRD: CPT | Mod: CPTII,,, | Performed by: NURSE PRACTITIONER

## 2021-07-29 PROCEDURE — 1125F PR PAIN SEVERITY QUANTIFIED, PAIN PRESENT: ICD-10-PCS | Mod: CPTII,,, | Performed by: NURSE PRACTITIONER

## 2021-07-29 PROCEDURE — 99213 OFFICE O/P EST LOW 20 MIN: CPT | Mod: 95,,, | Performed by: NURSE PRACTITIONER

## 2021-07-29 PROCEDURE — 1159F PR MEDICATION LIST DOCUMENTED IN MEDICAL RECORD: ICD-10-PCS | Mod: CPTII,,, | Performed by: NURSE PRACTITIONER

## 2021-07-29 PROCEDURE — 99213 PR OFFICE/OUTPT VISIT, EST, LEVL III, 20-29 MIN: ICD-10-PCS | Mod: 95,,, | Performed by: NURSE PRACTITIONER

## 2021-07-29 PROCEDURE — 1159F MED LIST DOCD IN RCRD: CPT | Mod: CPTII,,, | Performed by: NURSE PRACTITIONER

## 2021-07-29 PROCEDURE — 1125F AMNT PAIN NOTED PAIN PRSNT: CPT | Mod: CPTII,,, | Performed by: NURSE PRACTITIONER

## 2021-07-29 PROCEDURE — 1160F PR REVIEW ALL MEDS BY PRESCRIBER/CLIN PHARMACIST DOCUMENTED: ICD-10-PCS | Mod: CPTII,,, | Performed by: NURSE PRACTITIONER

## 2021-07-29 RX ORDER — PREDNISONE 10 MG/1
TABLET ORAL
Qty: 20 TABLET | Refills: 0 | Status: SHIPPED | OUTPATIENT
Start: 2021-07-29 | End: 2021-08-06

## 2021-07-29 RX ORDER — ELETRIPTAN HYDROBROMIDE 40 MG/1
TABLET, FILM COATED ORAL
Qty: 12 TABLET | Refills: 11 | Status: SHIPPED | OUTPATIENT
Start: 2021-07-29 | End: 2022-12-05 | Stop reason: SDUPTHER

## 2021-08-06 ENCOUNTER — PATIENT MESSAGE (OUTPATIENT)
Dept: NEUROLOGY | Facility: CLINIC | Age: 56
End: 2021-08-06

## 2021-08-06 DIAGNOSIS — G43.719 INTRACTABLE CHRONIC MIGRAINE WITHOUT AURA AND WITHOUT STATUS MIGRAINOSUS: ICD-10-CM

## 2021-08-06 RX ORDER — UBROGEPANT 100 MG/1
100 TABLET ORAL ONCE AS NEEDED
Qty: 10 TABLET | Refills: 2 | Status: SHIPPED | OUTPATIENT
Start: 2021-08-06 | End: 2021-11-09 | Stop reason: SDUPTHER

## 2021-08-09 ENCOUNTER — TELEPHONE (OUTPATIENT)
Dept: PHARMACY | Facility: CLINIC | Age: 56
End: 2021-08-09

## 2021-10-27 ENCOUNTER — PROCEDURE VISIT (OUTPATIENT)
Dept: NEUROLOGY | Facility: CLINIC | Age: 56
End: 2021-10-27
Payer: COMMERCIAL

## 2021-10-27 VITALS
RESPIRATION RATE: 17 BRPM | HEIGHT: 64 IN | HEART RATE: 76 BPM | TEMPERATURE: 97 F | BODY MASS INDEX: 25.63 KG/M2 | DIASTOLIC BLOOD PRESSURE: 85 MMHG | SYSTOLIC BLOOD PRESSURE: 142 MMHG | WEIGHT: 150.13 LBS

## 2021-10-27 DIAGNOSIS — G43.719 INTRACTABLE CHRONIC MIGRAINE WITHOUT AURA AND WITHOUT STATUS MIGRAINOSUS: Primary | ICD-10-CM

## 2021-10-27 PROCEDURE — 64615 CHEMODENERV MUSC MIGRAINE: CPT | Mod: S$GLB,,, | Performed by: PSYCHIATRY & NEUROLOGY

## 2021-10-27 PROCEDURE — 64615 PR CHEMODENERVATION OF MUSCLE FOR CHRONIC MIGRAINE: ICD-10-PCS | Mod: S$GLB,,, | Performed by: PSYCHIATRY & NEUROLOGY

## 2021-10-28 ENCOUNTER — IMMUNIZATION (OUTPATIENT)
Dept: PHARMACY | Facility: CLINIC | Age: 56
End: 2021-10-28
Payer: COMMERCIAL

## 2021-10-28 DIAGNOSIS — Z23 NEED FOR VACCINATION: Primary | ICD-10-CM

## 2021-11-03 ENCOUNTER — PATIENT MESSAGE (OUTPATIENT)
Dept: NEUROLOGY | Facility: CLINIC | Age: 56
End: 2021-11-03
Payer: COMMERCIAL

## 2021-11-04 DIAGNOSIS — G43.719 INTRACTABLE CHRONIC MIGRAINE WITHOUT AURA AND WITHOUT STATUS MIGRAINOSUS: Primary | ICD-10-CM

## 2021-11-04 RX ORDER — FREMANEZUMAB-VFRM 225 MG/1.5ML
225 INJECTION SUBCUTANEOUS
Qty: 1.5 ML | Refills: 11 | Status: SHIPPED | OUTPATIENT
Start: 2021-11-04 | End: 2022-12-05 | Stop reason: SDUPTHER

## 2021-11-05 ENCOUNTER — TELEPHONE (OUTPATIENT)
Dept: PHARMACY | Facility: CLINIC | Age: 56
End: 2021-11-05
Payer: COMMERCIAL

## 2021-11-07 ENCOUNTER — PATIENT MESSAGE (OUTPATIENT)
Dept: NEUROLOGY | Facility: CLINIC | Age: 56
End: 2021-11-07
Payer: COMMERCIAL

## 2021-11-09 ENCOUNTER — LAB VISIT (OUTPATIENT)
Dept: LAB | Facility: HOSPITAL | Age: 56
End: 2021-11-09
Attending: NURSE PRACTITIONER
Payer: COMMERCIAL

## 2021-11-09 DIAGNOSIS — E03.8 ADULT ONSET HYPOTHYROIDISM: ICD-10-CM

## 2021-11-09 DIAGNOSIS — E78.2 MIXED HYPERLIPIDEMIA: ICD-10-CM

## 2021-11-09 DIAGNOSIS — R23.2 FLUSHING: ICD-10-CM

## 2021-11-09 LAB
ALBUMIN SERPL BCP-MCNC: 4.6 G/DL (ref 3.5–5.2)
ALP SERPL-CCNC: 55 U/L (ref 55–135)
ALT SERPL W/O P-5'-P-CCNC: 18 U/L (ref 10–44)
ANION GAP SERPL CALC-SCNC: 9 MMOL/L (ref 8–16)
AST SERPL-CCNC: 20 U/L (ref 10–40)
BILIRUB SERPL-MCNC: 0.5 MG/DL (ref 0.1–1)
BUN SERPL-MCNC: 11 MG/DL (ref 6–20)
CALCIUM SERPL-MCNC: 10.4 MG/DL (ref 8.7–10.5)
CHLORIDE SERPL-SCNC: 102 MMOL/L (ref 95–110)
CHOLEST SERPL-MCNC: 226 MG/DL (ref 120–199)
CHOLEST/HDLC SERPL: 4.1 {RATIO} (ref 2–5)
CO2 SERPL-SCNC: 26 MMOL/L (ref 23–29)
CREAT SERPL-MCNC: 0.8 MG/DL (ref 0.5–1.4)
EST. GFR  (AFRICAN AMERICAN): >60 ML/MIN/1.73 M^2
EST. GFR  (NON AFRICAN AMERICAN): >60 ML/MIN/1.73 M^2
ESTIMATED AVG GLUCOSE: 114 MG/DL (ref 68–131)
GLUCOSE SERPL-MCNC: 91 MG/DL (ref 70–110)
HBA1C MFR BLD: 5.6 % (ref 4–5.6)
HDLC SERPL-MCNC: 55 MG/DL (ref 40–75)
HDLC SERPL: 24.3 % (ref 20–50)
LDLC SERPL CALC-MCNC: 155.4 MG/DL (ref 63–159)
NONHDLC SERPL-MCNC: 171 MG/DL
POTASSIUM SERPL-SCNC: 4 MMOL/L (ref 3.5–5.1)
PROT SERPL-MCNC: 8 G/DL (ref 6–8.4)
SODIUM SERPL-SCNC: 137 MMOL/L (ref 136–145)
T3FREE SERPL-MCNC: 2.8 PG/ML (ref 2.3–4.2)
T4 FREE SERPL-MCNC: 0.77 NG/DL (ref 0.71–1.51)
TRIGL SERPL-MCNC: 78 MG/DL (ref 30–150)
TSH SERPL DL<=0.005 MIU/L-ACNC: 1.15 UIU/ML (ref 0.4–4)

## 2021-11-09 PROCEDURE — 80061 LIPID PANEL: CPT | Performed by: NURSE PRACTITIONER

## 2021-11-09 PROCEDURE — 84481 FREE ASSAY (FT-3): CPT | Performed by: NURSE PRACTITIONER

## 2021-11-09 PROCEDURE — 80053 COMPREHEN METABOLIC PANEL: CPT | Performed by: NURSE PRACTITIONER

## 2021-11-09 PROCEDURE — 84443 ASSAY THYROID STIM HORMONE: CPT | Performed by: NURSE PRACTITIONER

## 2021-11-09 PROCEDURE — 86038 ANTINUCLEAR ANTIBODIES: CPT | Performed by: NURSE PRACTITIONER

## 2021-11-09 PROCEDURE — 84439 ASSAY OF FREE THYROXINE: CPT | Performed by: NURSE PRACTITIONER

## 2021-11-09 PROCEDURE — 85025 COMPLETE CBC W/AUTO DIFF WBC: CPT | Performed by: NURSE PRACTITIONER

## 2021-11-09 PROCEDURE — 36415 COLL VENOUS BLD VENIPUNCTURE: CPT | Mod: PO | Performed by: NURSE PRACTITIONER

## 2021-11-09 PROCEDURE — 83036 HEMOGLOBIN GLYCOSYLATED A1C: CPT | Performed by: NURSE PRACTITIONER

## 2021-11-09 PROCEDURE — 86376 MICROSOMAL ANTIBODY EACH: CPT | Performed by: NURSE PRACTITIONER

## 2021-11-10 LAB
ANA SER QL IF: NORMAL
BASOPHILS # BLD AUTO: 0.05 K/UL (ref 0–0.2)
BASOPHILS NFR BLD: 1.2 % (ref 0–1.9)
DIFFERENTIAL METHOD: NORMAL
EOSINOPHIL # BLD AUTO: 0.1 K/UL (ref 0–0.5)
EOSINOPHIL NFR BLD: 3.3 % (ref 0–8)
ERYTHROCYTE [DISTWIDTH] IN BLOOD BY AUTOMATED COUNT: 12.7 % (ref 11.5–14.5)
HCT VFR BLD AUTO: 42.6 % (ref 37–48.5)
HGB BLD-MCNC: 14.1 G/DL (ref 12–16)
IMM GRANULOCYTES # BLD AUTO: 0.01 K/UL (ref 0–0.04)
IMM GRANULOCYTES NFR BLD AUTO: 0.2 % (ref 0–0.5)
LYMPHOCYTES # BLD AUTO: 1.3 K/UL (ref 1–4.8)
LYMPHOCYTES NFR BLD: 31.8 % (ref 18–48)
MCH RBC QN AUTO: 30.5 PG (ref 27–31)
MCHC RBC AUTO-ENTMCNC: 33.1 G/DL (ref 32–36)
MCV RBC AUTO: 92 FL (ref 82–98)
MONOCYTES # BLD AUTO: 0.3 K/UL (ref 0.3–1)
MONOCYTES NFR BLD: 6.5 % (ref 4–15)
NEUTROPHILS # BLD AUTO: 2.4 K/UL (ref 1.8–7.7)
NEUTROPHILS NFR BLD: 57 % (ref 38–73)
NRBC BLD-RTO: 0 /100 WBC
PLATELET # BLD AUTO: 360 K/UL (ref 150–450)
PMV BLD AUTO: 9.7 FL (ref 9.2–12.9)
RBC # BLD AUTO: 4.63 M/UL (ref 4–5.4)
THYROPEROXIDASE IGG SERPL-ACNC: 523.4 IU/ML
WBC # BLD AUTO: 4.18 K/UL (ref 3.9–12.7)

## 2022-01-21 ENCOUNTER — PROCEDURE VISIT (OUTPATIENT)
Dept: NEUROLOGY | Facility: CLINIC | Age: 57
End: 2022-01-21
Payer: COMMERCIAL

## 2022-01-21 VITALS
RESPIRATION RATE: 17 BRPM | BODY MASS INDEX: 25.46 KG/M2 | DIASTOLIC BLOOD PRESSURE: 88 MMHG | HEART RATE: 86 BPM | SYSTOLIC BLOOD PRESSURE: 149 MMHG | HEIGHT: 64 IN | TEMPERATURE: 97 F | WEIGHT: 149.13 LBS

## 2022-01-21 DIAGNOSIS — G43.719 INTRACTABLE CHRONIC MIGRAINE WITHOUT AURA AND WITHOUT STATUS MIGRAINOSUS: Primary | ICD-10-CM

## 2022-01-21 PROCEDURE — 64615 CHEMODENERV MUSC MIGRAINE: CPT | Mod: S$GLB,,, | Performed by: PSYCHIATRY & NEUROLOGY

## 2022-01-21 PROCEDURE — 64615 PR CHEMODENERVATION OF MUSCLE FOR CHRONIC MIGRAINE: ICD-10-PCS | Mod: S$GLB,,, | Performed by: PSYCHIATRY & NEUROLOGY

## 2022-01-21 RX ORDER — UBROGEPANT 100 MG/1
100 TABLET ORAL ONCE AS NEEDED
Qty: 10 TABLET | Refills: 11 | Status: SHIPPED | OUTPATIENT
Start: 2022-01-21 | End: 2022-01-22

## 2022-01-21 RX ORDER — PROCHLORPERAZINE MALEATE 10 MG
10 TABLET ORAL 3 TIMES DAILY PRN
Qty: 20 TABLET | Refills: 11 | Status: SHIPPED | OUTPATIENT
Start: 2022-01-21 | End: 2022-08-04

## 2022-01-21 RX ORDER — ONDANSETRON 4 MG/1
4 TABLET, ORALLY DISINTEGRATING ORAL EVERY 6 HOURS PRN
Qty: 30 TABLET | Refills: 11 | Status: SHIPPED | OUTPATIENT
Start: 2022-01-21 | End: 2022-08-04

## 2022-01-21 NOTE — PROCEDURES
Procedures  BOTOX PROCEDURE    PROCEDURE PERFORMED: Botulinum toxin injection (02149)    CLINICAL INDICATION: G43.719    A time out was conducted just before the start of the procedure to verify the correct patient and procedure, procedure location, and all relevant critical information.   The patient meets criteria for chronic headaches according to the ICHD-II, the patient has more than 15 headaches a month out of at least 8 are migraines which last for more than 4 hours a day.    The Botox injections have achieved well over 50%  improvement in the patient's symptoms. Migraines have been reduced at least 7 days per month and the number of cumulative hours suffering with headaches has been reduced at least 100 total hours per month. Today she does have a headache indicating that the Botox has worn off. Frequency of treatment is every 3 months unless no response to the treatments, at which time we will discontinue the injections.      DESCRIPTION OF PROCEDURE: After obtaining informed consent and under aseptic technique, a total of 175 units of botulinum toxin type A were injected in the following muscles: Procerus 5 units,  5 units bilaterally, frontalis 20 units, temporalis 20 units bilaterally, occipitalis 15 units, upper cervical paraspinals 10 units bilaterally, Temporalis 10 units bilaterally and trapezius 15 units bilaterally. The patient was given a total of 175 units in 31 sites.The patient tolerated the procedure well. There were no complications. The patient was given a prescription for repeat treatment in 3 months.     Unavoidable waste 25 units      Hemanth Kowalski M.D  Medical Director, Headache and Facial Pain  [facility]

## 2022-01-24 ENCOUNTER — HOSPITAL ENCOUNTER (OUTPATIENT)
Dept: RADIOLOGY | Facility: HOSPITAL | Age: 57
Discharge: HOME | End: 2022-01-24
Attending: PODIATRIST
Payer: COMMERCIAL

## 2022-01-24 ENCOUNTER — OFFICE VISIT (OUTPATIENT)
Dept: PODIATRY | Facility: CLINIC | Age: 57
End: 2022-01-24
Payer: COMMERCIAL

## 2022-01-24 DIAGNOSIS — M72.2 PLANTAR FASCIITIS OF RIGHT FOOT: ICD-10-CM

## 2022-01-24 DIAGNOSIS — G57.53 TARSAL TUNNEL SYNDROME OF BOTH LOWER EXTREMITIES: ICD-10-CM

## 2022-01-24 DIAGNOSIS — M72.2 PLANTAR FASCIITIS OF RIGHT FOOT: Primary | ICD-10-CM

## 2022-01-24 DIAGNOSIS — M72.2 PLANTAR FASCIITIS OF LEFT FOOT: ICD-10-CM

## 2022-01-24 PROCEDURE — 73630 XR FOOT COMPLETE 3 VIEW BILATERAL: ICD-10-PCS | Mod: 26,,, | Performed by: RADIOLOGY

## 2022-01-24 PROCEDURE — 99204 PR OFFICE/OUTPT VISIT, NEW, LEVL IV, 45-59 MIN: ICD-10-PCS | Mod: S$GLB,,, | Performed by: PODIATRIST

## 2022-01-24 PROCEDURE — 1160F RVW MEDS BY RX/DR IN RCRD: CPT | Mod: CPTII,S$GLB,, | Performed by: PODIATRIST

## 2022-01-24 PROCEDURE — 73630 X-RAY EXAM OF FOOT: CPT | Mod: 26,,, | Performed by: RADIOLOGY

## 2022-01-24 PROCEDURE — 1160F PR REVIEW ALL MEDS BY PRESCRIBER/CLIN PHARMACIST DOCUMENTED: ICD-10-PCS | Mod: CPTII,S$GLB,, | Performed by: PODIATRIST

## 2022-01-24 PROCEDURE — 73630 X-RAY EXAM OF FOOT: CPT | Mod: TC,50,PO

## 2022-01-24 PROCEDURE — 99999 PR PBB SHADOW E&M-EST. PATIENT-LVL IV: CPT | Mod: PBBFAC,,, | Performed by: PODIATRIST

## 2022-01-24 PROCEDURE — 1159F PR MEDICATION LIST DOCUMENTED IN MEDICAL RECORD: ICD-10-PCS | Mod: CPTII,S$GLB,, | Performed by: PODIATRIST

## 2022-01-24 PROCEDURE — 1159F MED LIST DOCD IN RCRD: CPT | Mod: CPTII,S$GLB,, | Performed by: PODIATRIST

## 2022-01-24 PROCEDURE — 99204 OFFICE O/P NEW MOD 45 MIN: CPT | Mod: S$GLB,,, | Performed by: PODIATRIST

## 2022-01-24 PROCEDURE — 99999 PR PBB SHADOW E&M-EST. PATIENT-LVL IV: ICD-10-PCS | Mod: PBBFAC,,, | Performed by: PODIATRIST

## 2022-01-24 RX ORDER — GABAPENTIN 300 MG/1
300 CAPSULE ORAL NIGHTLY
Qty: 30 CAPSULE | Refills: 11 | Status: SHIPPED | OUTPATIENT
Start: 2022-01-24 | End: 2022-08-04

## 2022-01-24 RX ORDER — METHYLPREDNISOLONE 4 MG/1
TABLET ORAL
Qty: 1 EACH | Refills: 0 | Status: SHIPPED | OUTPATIENT
Start: 2022-01-24 | End: 2022-02-14

## 2022-01-24 NOTE — PATIENT INSTRUCTIONS
1. Stretch calf and plantar fascia at least 3x per day for 30 sec and before getting out of bed.    2. Supportive shoes at all times (athletic shoe including puente, new balance, asics, HOKA or casual shoes like Dansko, Genia, Naot, Vionoic, Fit flop  clog or wedge with extra heel padding and arch support. For house slippers would recommend Fitflop or Spenco found on amazon.com, Never walk barefoot or in flats.    (Varsity sports, Phidippides, [place] running [employer], Masseys, Goodfeet, Cantilever, Feet First, Foot Solutions, Therapeutic shoes, SAS, [facility] pro shop) http://www.[employer]/    3. Orthotic (recommend the following brands: Superfeet, Spenco, Powerstep, Sof Sole Fit Series)                  5. ICE massage with frozen water bottle 2x per day for 30 minutes.    6. Consider night splint, custom orthotics, therapy and/or steroid injection    What Is Plantar Fasciitis?   The plantar fascia is a ligament-like band running from your heel to the ball of your foot. This band pulls on the heel bone, raising the arch of your foot as it pushes off the ground. But if your foot moves incorrectly, the plantar fascia may become strained. The fascia may swell and its tiny fibers may begin to fray, causing plantar fasciitis.  Causes  Plantar fasciitis is often caused by poor foot mechanics. If your foot flattens too much, the fascia may overstretch and swell. If your foot flattens too little, the fascia may ache from being pulled too tight.    The plantar fascia is a thick, fibrous layer of tissue that covers the bones on the bottom of your foot. It holds the foot bones in an arched position. Plantar fasciitis is a painful swelling of the plantar fascia.  A heel spur is an overgrowth of bone where the plantar fascia attaches to the heel bone. The heel spur itself usually doesnt cause pain. However, the heel spur might be a sign of plantar fasciitis which may cause your foot pain. There is no  specific treatment for heel spurs.   Plantar fasciitis can develop slowly or suddenly. It usually affects one foot at a time. Heel pain can feel sharp, like a knife sticking into the bottom of your foot. You may feel pain after exercising, long-distance jogging, stair climbing, long periods of standing, or after standing up.  Risk factors for plantar fasciitis include: arthritis, diabetes, obesity or recent weight gain, flat foot, and having high arches. Wearing high heels, loose shoes, or shoes with poor arch support adds to the risk.    Foot pain is usually worse in the morning. But it improves with walking. By the end of the day there may be a dull aching. Treatment includes short-term rest and controlling inflammation. It may take up to 9 months before all symptoms go away. In rare cases, a steroid injection in the foot, or surgery, may be needed.  Home care  · If you are overweight, lose weight to help healing.  · Choose supportive shoes with good arch support and shock absorbency. Replace athletic shoes when they become worn out. Dont walk or run barefoot.  · Premade or custom-fitted shoe inserts may be helpful. Inserts made of silicone seem to be the most effective. Custom-made inserts can be provided by a podiatrist or foot specialist, physical therapist, or orthopedist.  · Premade or custom-made night splints keep the heel stretched out while you sleep. They may prevent morning pain.  · Avoid activities that stress the feet: jogging, prolonged standing or walking, contact sports, etc.  · First thing in the morning and before sports, stretch the bottom of your foot. Gently flex your ankle so the toes move toward your knee.  · Icing may help control heel pain. Apply an ice pack to the heel for 10-20 minutes as a preventive. Or ice your heel after a severe flare-up of symptoms. You may repeat this every 1-2 hours as needed.  · You may use over-the-counter pain medicine to control pain, unless another medicine  was prescribed. Anti-inflammatory pain medicines, such as ibuprofen or naproxen, may work better than acetaminophen. If you have chronic liver or kidney disease or ever had a stomach ulcer or GI bleeding, talk with your healthcare provider before using these medicines.  · Shoe inserts, a night splint, or a special boot may be needed. Use these as directed by your healthcare provider.      Treating Plantar Fasciitis    First, your doctor relieves pain. Then, the cause of your problem may be found and corrected. If your pain is due to poor foot mechanics, custom-made shoe inserts (orthoses) may help.        Reduce Symptoms:  · To relieve mild symptoms, try aspirin, ibuprofen, or other medications as directed. Rubbing ice on the affected area may also help.  · To reduce severe pain and swelling, your doctor may prescribe pills or injections or a walking cast in some instances. Physical therapy, such as ultrasound or a daily stretching program, may also be recommended. Surgery is rarely required.  · To reduce symptoms caused by poor foot mechanics, your foot may be taped. This supports the arch and temporarily controls movement. Night splints may also help by stretching the fascia.    Control Movement  If taping helps, your doctor may prescribe orthoses. Built from plaster casts of your feet, these inserts control the way your foot moves. As a result, your symptoms should go away.  If Surgery Is Needed  Your doctor may consider surgery if other types of treatment don't control your pain. During surgery, the plantar fascia is partially cut to release tension. As you heal, fibrous tissue fills the space between the heel bone and the plantar fascia.   Reduce Overuse  Every time your foot strikes the ground, the plantar fascia is stretched. You can reduce the strain on the plantar fascia and the possibility of overuse by following these suggestions:  · Lose any excess weight.  · Avoid running on hard or uneven  ground.  · Use orthoses at all times in your shoes and house slippers.  © 4034-6122 [employer]. [address] [place] [address]. All rights reserved. This information is not intended as a substitute for professional medical care. Always follow your healthcare professional's instructions.            _                Lower Body Exercises: Calf Stretch    This exercise both stretches and strengthens your lower body to help your back. Do the exercise as often as suggested by your health care provider. As you work out, dont rush or strain. Use an exercise mat, pillow, or folded towel to protect your knees and other sensitive areas.  · Face a wall 2 feet away. Step toward the wall with one foot.  · Place both palms on the wall and bend your front knee.  · Lean forward, keeping the back leg straight and the heel on the floor.  · Hold for 20 seconds. Switch legs.  © 1807-0699 [employer]. [address] [place] [address]. All rights reserved. This information is not intended as a substitute for professional medical care. Always follow your healthcare professional's instructions.    These instructions are for your right foot. Switch sides for your left foot.  1. Sit in a chair. Rest your right ankle on your left knee.  2. Hold your toes with your right hand. Gently bend the toes backward. Feel a stretch in the undersides of the toes and ball of the foot. Hold for 30 to 60 seconds.  3. Then gently bend the toes in the other direction. Gently press on them until your foot is pointed. Hold for 30 to 60 seconds.  4. Repeat 5 times, or as instructed.  Date Last Reviewed: 5/1/2016  © 6400-2794 [employer]. [address] [place] [address]. All rights reserved. This information is not intended as a substitute for professional medical care. Always follow your healthcare professional's instructions.

## 2022-01-24 NOTE — PROGRESS NOTES
Subjective:      Patient ID: Mayra Smart is a 56 y.o. female.    Chief Complaint: Foot Pain (Bilateral ) and Heel Pain (Bilateral )    Mayra is a 56 y.o. female who presents to the podiatry clinic  with complaint of  bilateral foot pain left worse than right. Onset of the symptoms was several months ago. Precipitating event: none known. Current symptoms include: ability to bear weight, but with some pain, worsening symptoms after a period of activity and sharp shooting pains in the heel, aching in the bottom of the feet, burning and tingling at times. Aggravating factors: any weight bearing and first few steps in the morning and then bad by the end of the day again. Symptoms have gradually worsened. Patient has had prior foot problems. Evaluation to date: none. Treatment to date: wearing supportive shoes, OTC inserts, stretching exercises. occasional aleve all of which helps some. Patients rates pain 4/10 on pain scale.        Review of Systems   Constitutional: Negative for chills and fever.   Cardiovascular: Negative for claudication and leg swelling.   Respiratory: Negative for shortness of breath.    Skin: Negative for itching, nail changes and rash.   Musculoskeletal: Positive for arthritis. Negative for muscle cramps, muscle weakness and myalgias.        Bilateral foot pain   Gastrointestinal: Negative for nausea and vomiting.   Neurological: Positive for paresthesias. Negative for focal weakness, loss of balance and numbness.           Objective:      Physical Exam  Constitutional:       General: She is not in acute distress.     Appearance: She is well-developed and well-nourished. She is not diaphoretic.   Cardiovascular:      Pulses:           Dorsalis pedis pulses are 2+ on the right side and 2+ on the left side.        Posterior tibial pulses are 2+ on the right side and 2+ on the left side.      Comments: < 3 sec capillary refill time to toes 1-5 bilateral. Toes and feet are warm to touch  proximally with normal distal cooling b/l. There is some hair growth on the feet and toes b/l. There is no edema b/l. No spider veins or varicosities present b/l.     Musculoskeletal:      Comments: Equinus noted b/l ankles with < 10 deg DF noted. MMT 5/5 in DF/PF/Inv/Ev resistance with no reproduction of pain in any direction. Passive range of motion of ankle and pedal joints is painless b/l.     Skin:     General: Skin is warm, dry and intact.      Coloration: Skin is not pale.      Findings: No abrasion, bruising, burn, ecchymosis, erythema, laceration, lesion, petechiae or rash.      Nails: There is no clubbing or cyanosis.      Comments: Skin temperature, texture and turgor within normal limits.   Neurological:      Mental Status: She is alert and oriented to person, place, and time.      Sensory: No sensory deficit.      Motor: No tremor, atrophy or abnormal muscle tone.      Deep Tendon Reflexes: Strength normal.      Comments: Positive tinel sign on the left, more mild on the right. Also has paresthesias over the common fibular nerve at the fibular head.    Psychiatric:         Mood and Affect: Mood and affect normal.         Behavior: Behavior normal.               Assessment:       Encounter Diagnoses   Name Primary?    Plantar fasciitis of right foot Yes    Plantar fasciitis of left foot     Tarsal tunnel syndrome of both lower extremities          Plan:       Mayra was seen today for foot pain and heel pain.    Diagnoses and all orders for this visit:    Plantar fasciitis of right foot  -     X-Ray Foot Complete Bilateral; Future    Plantar fasciitis of left foot  -     X-Ray Foot Complete Bilateral; Future    Tarsal tunnel syndrome of both lower extremities  -     X-Ray Foot Complete Bilateral; Future  -     EMG W/ ULTRASOUND AND NERVE CONDUCTION TEST 2 Extremities; Future    Other orders  -     methylPREDNISolone (MEDROL DOSEPACK) 4 mg tablet; use as directed  -     gabapentin (NEURONTIN) 300 MG  capsule; Take 1 capsule (300 mg total) by mouth every evening.      I counseled the patient on her conditions, their implications and medical management.    Patient will stretch the tendo achilles complex three times daily as demonstrated in the office.  Literature was dispensed illustrating proper stretching technique.    Continue wearing supportive shoes at all times    Medrol dose pack for inflammation    Gabapentin nightly for the tarsal tunnel symptoms    X-ray and EMG ordered with review results as they come in    Return in 6 weeks for follow up care    Rosalio Campuzano DPM

## 2022-02-22 ENCOUNTER — LAB VISIT (OUTPATIENT)
Dept: LAB | Facility: HOSPITAL | Age: 57
End: 2022-02-22
Attending: INTERNAL MEDICINE
Payer: COMMERCIAL

## 2022-02-22 ENCOUNTER — TELEPHONE (OUTPATIENT)
Dept: CARDIOLOGY | Facility: CLINIC | Age: 57
End: 2022-02-22
Payer: COMMERCIAL

## 2022-02-22 ENCOUNTER — OFFICE VISIT (OUTPATIENT)
Dept: CARDIOLOGY | Facility: CLINIC | Age: 57
End: 2022-02-22
Payer: COMMERCIAL

## 2022-02-22 VITALS
BODY MASS INDEX: 25.39 KG/M2 | WEIGHT: 147.94 LBS | SYSTOLIC BLOOD PRESSURE: 112 MMHG | HEART RATE: 77 BPM | DIASTOLIC BLOOD PRESSURE: 77 MMHG

## 2022-02-22 DIAGNOSIS — R94.6 NONSPECIFIC ABNORMAL RESULTS OF THYROID FUNCTION STUDY: ICD-10-CM

## 2022-02-22 DIAGNOSIS — I10 ESSENTIAL HYPERTENSION, MALIGNANT: Primary | ICD-10-CM

## 2022-02-22 DIAGNOSIS — I10 ESSENTIAL HYPERTENSION, MALIGNANT: ICD-10-CM

## 2022-02-22 DIAGNOSIS — I49.3 PVC'S (PREMATURE VENTRICULAR CONTRACTIONS): ICD-10-CM

## 2022-02-22 DIAGNOSIS — E78.2 MIXED HYPERLIPIDEMIA: ICD-10-CM

## 2022-02-22 DIAGNOSIS — R88.8 ABNORMAL FINDINGS IN OTHER BODY FLUIDS AND SUBSTANCES: ICD-10-CM

## 2022-02-22 DIAGNOSIS — R53.83 OTHER FATIGUE: ICD-10-CM

## 2022-02-22 LAB
ALBUMIN SERPL BCP-MCNC: 4 G/DL (ref 3.5–5.2)
ALP SERPL-CCNC: 57 U/L (ref 55–135)
ALT SERPL W/O P-5'-P-CCNC: 13 U/L (ref 10–44)
ANION GAP SERPL CALC-SCNC: 10 MMOL/L (ref 8–16)
AST SERPL-CCNC: 16 U/L (ref 10–40)
BASOPHILS # BLD AUTO: 0.04 K/UL (ref 0–0.2)
BASOPHILS NFR BLD: 1 % (ref 0–1.9)
BILIRUB SERPL-MCNC: 0.5 MG/DL (ref 0.1–1)
BUN SERPL-MCNC: 12 MG/DL (ref 6–20)
CALCIUM SERPL-MCNC: 9.4 MG/DL (ref 8.7–10.5)
CHLORIDE SERPL-SCNC: 105 MMOL/L (ref 95–110)
CHOLEST SERPL-MCNC: 204 MG/DL (ref 120–199)
CHOLEST/HDLC SERPL: 4.3 {RATIO} (ref 2–5)
CO2 SERPL-SCNC: 24 MMOL/L (ref 23–29)
CORTIS SERPL-MCNC: 7.6 UG/DL (ref 4.3–22.4)
CREAT SERPL-MCNC: 0.8 MG/DL (ref 0.5–1.4)
DIFFERENTIAL METHOD: NORMAL
EOSINOPHIL # BLD AUTO: 0.3 K/UL (ref 0–0.5)
EOSINOPHIL NFR BLD: 7.2 % (ref 0–8)
ERYTHROCYTE [DISTWIDTH] IN BLOOD BY AUTOMATED COUNT: 12.3 % (ref 11.5–14.5)
EST. GFR  (AFRICAN AMERICAN): >60 ML/MIN/1.73 M^2
EST. GFR  (NON AFRICAN AMERICAN): >60 ML/MIN/1.73 M^2
GLUCOSE SERPL-MCNC: 95 MG/DL (ref 70–110)
HCT VFR BLD AUTO: 40.7 % (ref 37–48.5)
HDLC SERPL-MCNC: 48 MG/DL (ref 40–75)
HDLC SERPL: 23.5 % (ref 20–50)
HGB BLD-MCNC: 13.5 G/DL (ref 12–16)
IMM GRANULOCYTES # BLD AUTO: 0.01 K/UL (ref 0–0.04)
IMM GRANULOCYTES NFR BLD AUTO: 0.2 % (ref 0–0.5)
LDLC SERPL CALC-MCNC: 137.4 MG/DL (ref 63–159)
LYMPHOCYTES # BLD AUTO: 1 K/UL (ref 1–4.8)
LYMPHOCYTES NFR BLD: 25.9 % (ref 18–48)
MCH RBC QN AUTO: 30.1 PG (ref 27–31)
MCHC RBC AUTO-ENTMCNC: 33.2 G/DL (ref 32–36)
MCV RBC AUTO: 91 FL (ref 82–98)
MONOCYTES # BLD AUTO: 0.3 K/UL (ref 0.3–1)
MONOCYTES NFR BLD: 7.5 % (ref 4–15)
NEUTROPHILS # BLD AUTO: 2.3 K/UL (ref 1.8–7.7)
NEUTROPHILS NFR BLD: 58.2 % (ref 38–73)
NONHDLC SERPL-MCNC: 156 MG/DL
NRBC BLD-RTO: 0 /100 WBC
PLATELET # BLD AUTO: 303 K/UL (ref 150–450)
PMV BLD AUTO: 9.6 FL (ref 9.2–12.9)
POTASSIUM SERPL-SCNC: 3.8 MMOL/L (ref 3.5–5.1)
PROT SERPL-MCNC: 7.2 G/DL (ref 6–8.4)
RBC # BLD AUTO: 4.48 M/UL (ref 4–5.4)
SODIUM SERPL-SCNC: 139 MMOL/L (ref 136–145)
T3FREE SERPL-MCNC: 2.8 PG/ML (ref 2.3–4.2)
T4 FREE SERPL-MCNC: 0.66 NG/DL (ref 0.71–1.51)
TRIGL SERPL-MCNC: 93 MG/DL (ref 30–150)
TSH SERPL DL<=0.005 MIU/L-ACNC: 0.55 UIU/ML (ref 0.4–4)
WBC # BLD AUTO: 4.02 K/UL (ref 3.9–12.7)

## 2022-02-22 PROCEDURE — 84481 FREE ASSAY (FT-3): CPT

## 2022-02-22 PROCEDURE — 1160F RVW MEDS BY RX/DR IN RCRD: CPT | Mod: CPTII,S$GLB,, | Performed by: INTERNAL MEDICINE

## 2022-02-22 PROCEDURE — 3078F DIAST BP <80 MM HG: CPT | Mod: CPTII,S$GLB,, | Performed by: INTERNAL MEDICINE

## 2022-02-22 PROCEDURE — 3074F PR MOST RECENT SYSTOLIC BLOOD PRESSURE < 130 MM HG: ICD-10-PCS | Mod: CPTII,S$GLB,, | Performed by: INTERNAL MEDICINE

## 2022-02-22 PROCEDURE — 99204 OFFICE O/P NEW MOD 45 MIN: CPT | Mod: S$GLB,,, | Performed by: INTERNAL MEDICINE

## 2022-02-22 PROCEDURE — 36415 COLL VENOUS BLD VENIPUNCTURE: CPT | Mod: PO

## 2022-02-22 PROCEDURE — 84443 ASSAY THYROID STIM HORMONE: CPT

## 2022-02-22 PROCEDURE — 80061 LIPID PANEL: CPT

## 2022-02-22 PROCEDURE — 82533 TOTAL CORTISOL: CPT

## 2022-02-22 PROCEDURE — 1160F PR REVIEW ALL MEDS BY PRESCRIBER/CLIN PHARMACIST DOCUMENTED: ICD-10-PCS | Mod: CPTII,S$GLB,, | Performed by: INTERNAL MEDICINE

## 2022-02-22 PROCEDURE — 84439 ASSAY OF FREE THYROXINE: CPT

## 2022-02-22 PROCEDURE — 3074F SYST BP LT 130 MM HG: CPT | Mod: CPTII,S$GLB,, | Performed by: INTERNAL MEDICINE

## 2022-02-22 PROCEDURE — 99204 PR OFFICE/OUTPT VISIT, NEW, LEVL IV, 45-59 MIN: ICD-10-PCS | Mod: S$GLB,,, | Performed by: INTERNAL MEDICINE

## 2022-02-22 PROCEDURE — 1159F MED LIST DOCD IN RCRD: CPT | Mod: CPTII,S$GLB,, | Performed by: INTERNAL MEDICINE

## 2022-02-22 PROCEDURE — 3008F PR BODY MASS INDEX (BMI) DOCUMENTED: ICD-10-PCS | Mod: CPTII,S$GLB,, | Performed by: INTERNAL MEDICINE

## 2022-02-22 PROCEDURE — 3008F BODY MASS INDEX DOCD: CPT | Mod: CPTII,S$GLB,, | Performed by: INTERNAL MEDICINE

## 2022-02-22 PROCEDURE — 3078F PR MOST RECENT DIASTOLIC BLOOD PRESSURE < 80 MM HG: ICD-10-PCS | Mod: CPTII,S$GLB,, | Performed by: INTERNAL MEDICINE

## 2022-02-22 PROCEDURE — 1159F PR MEDICATION LIST DOCUMENTED IN MEDICAL RECORD: ICD-10-PCS | Mod: CPTII,S$GLB,, | Performed by: INTERNAL MEDICINE

## 2022-02-22 PROCEDURE — 99999 PR PBB SHADOW E&M-EST. PATIENT-LVL III: CPT | Mod: PBBFAC,,, | Performed by: INTERNAL MEDICINE

## 2022-02-22 PROCEDURE — 99999 PR PBB SHADOW E&M-EST. PATIENT-LVL III: ICD-10-PCS | Mod: PBBFAC,,, | Performed by: INTERNAL MEDICINE

## 2022-02-22 PROCEDURE — 80053 COMPREHEN METABOLIC PANEL: CPT

## 2022-02-22 PROCEDURE — 85025 COMPLETE CBC W/AUTO DIFF WBC: CPT

## 2022-02-22 NOTE — PROGRESS NOTES
"Subjective:    Patient ID:  Mayra Smart is a 56 y.o. female who presents for evaluation of Hypertension (Labile high blood pressure and PVS)      HPI56 yo WF with multiple subjective complaints of feeling "not right" with fluctuating BP and PVC's. Had some test at [facility] that are not available. Resting EKG normal. Has hx of thyroiditis and wanted to see a cardiologist that works hand and hand with endocrine. Explained we can refer her to endocrine but we are not in constant communication. She states some of this all started with her COVID vaccinations.     Review of Systems   Constitutional: Positive for malaise/fatigue. Negative for decreased appetite, fever, weight gain and weight loss.   HENT: Negative for hearing loss and nosebleeds.    Eyes: Negative for visual disturbance.   Cardiovascular: Positive for chest pain and dyspnea on exertion. Negative for claudication, cyanosis, irregular heartbeat, leg swelling, near-syncope, orthopnea, palpitations, paroxysmal nocturnal dyspnea and syncope.   Respiratory: Positive for shortness of breath. Negative for cough, hemoptysis, sleep disturbances due to breathing, snoring and wheezing.    Endocrine: Negative for cold intolerance, heat intolerance, polydipsia and polyuria.   Hematologic/Lymphatic: Negative for adenopathy and bleeding problem. Does not bruise/bleed easily.   Skin: Negative for color change, itching, poor wound healing, rash and suspicious lesions.   Musculoskeletal: Negative for arthritis, back pain, falls, joint pain, joint swelling, muscle cramps, muscle weakness and myalgias.   Gastrointestinal: Negative for bloating, abdominal pain, change in bowel habit, constipation, flatus, heartburn, hematemesis, hematochezia, hemorrhoids, jaundice, melena, nausea and vomiting.   Genitourinary: Negative for bladder incontinence, decreased libido, frequency, hematuria, hesitancy and urgency.   Neurological: Negative for brief paralysis, difficulty with " concentration, excessive daytime sleepiness, dizziness, focal weakness, headaches, light-headedness, loss of balance, numbness, vertigo and weakness.   Psychiatric/Behavioral: Negative for altered mental status, depression and memory loss. The patient does not have insomnia and is not nervous/anxious.    Allergic/Immunologic: Negative for environmental allergies, hives and persistent infections.        Objective:    Physical Exam  Vitals and nursing note reviewed.   Constitutional:       Appearance: She is well-developed.      Comments: /77 (BP Location: Left arm, Patient Position: Sitting, BP Method: Medium (Automatic))   Pulse 77   Wt 67.1 kg (147 lb 14.9 oz)   LMP 10/22/2008   BMI 25.39 kg/m²      HENT:      Head: Normocephalic and atraumatic.      Right Ear: External ear normal.      Left Ear: External ear normal.      Nose: Nose normal.   Eyes:      General: Lids are normal. No scleral icterus.        Right eye: No discharge.         Left eye: No discharge.      Conjunctiva/sclera: Conjunctivae normal.      Right eye: No hemorrhage.     Pupils: Pupils are equal, round, and reactive to light.   Neck:      Thyroid: No thyromegaly.      Vascular: No JVD.      Trachea: No tracheal deviation.   Cardiovascular:      Rate and Rhythm: Normal rate and regular rhythm.      Pulses: Intact distal pulses.      Heart sounds: Normal heart sounds. No murmur heard.    No friction rub. No gallop.   Pulmonary:      Effort: Pulmonary effort is normal. No respiratory distress.      Breath sounds: Normal breath sounds. No wheezing or rales.   Chest:      Chest wall: No tenderness.   Breasts: Breasts are symmetrical.       Abdominal:      General: Bowel sounds are normal. There is no distension.      Palpations: Abdomen is soft. There is no hepatomegaly or mass.      Tenderness: There is no abdominal tenderness. There is no guarding or rebound.   Musculoskeletal:         General: No tenderness. Normal range of motion.       Cervical back: Normal range of motion and neck supple.   Lymphadenopathy:      Cervical: No cervical adenopathy.   Skin:     General: Skin is warm and dry.      Coloration: Skin is not pale.      Findings: No erythema or rash.   Neurological:      Mental Status: She is alert and oriented to person, place, and time.      Cranial Nerves: No cranial nerve deficit.      Coordination: Coordination normal.      Deep Tendon Reflexes: Reflexes normal.   Psychiatric:         Behavior: Behavior normal.         Thought Content: Thought content normal.         Judgment: Judgment normal.           Assessment:       1. Essential hypertension, malignant    2. PVC's (premature ventricular contractions)         Plan:     Had a calcium score that was 0 making CAD unlikely. Was told her echo was normal but will try to get results    Recommended stress test to see if any exercised induce arrhythmias and BP response.     She will make appt with endocrine      Orders Placed This Encounter   Procedures    Exercise Stress - EKG    Holter monitor - 48 hour    IN OFFICE EKG 12-LEAD (to Muse)     Follow up in about 6 months (around 8/22/2022).             Detail Level: Zone Quality 265: Biopsy Follow-Up: Biopsy results reviewed, communicated, tracked, and documented

## 2022-03-02 ENCOUNTER — HOSPITAL ENCOUNTER (OUTPATIENT)
Dept: RADIOLOGY | Facility: HOSPITAL | Age: 57
Discharge: HOME | End: 2022-03-02
Payer: COMMERCIAL

## 2022-03-02 DIAGNOSIS — R06.02 SHORTNESS OF BREATH: ICD-10-CM

## 2022-03-02 PROCEDURE — 71046 XR CHEST PA AND LATERAL: ICD-10-PCS | Mod: 26,,, | Performed by: RADIOLOGY

## 2022-03-02 PROCEDURE — 71046 X-RAY EXAM CHEST 2 VIEWS: CPT | Mod: 26,,, | Performed by: RADIOLOGY

## 2022-03-02 PROCEDURE — 71046 X-RAY EXAM CHEST 2 VIEWS: CPT | Mod: TC,FY,PO

## 2022-03-07 ENCOUNTER — OFFICE VISIT (OUTPATIENT)
Dept: ENDOCRINOLOGY | Facility: CLINIC | Age: 57
End: 2022-03-07
Payer: COMMERCIAL

## 2022-03-07 VITALS
BODY MASS INDEX: 25.11 KG/M2 | HEART RATE: 63 BPM | SYSTOLIC BLOOD PRESSURE: 118 MMHG | WEIGHT: 147.06 LBS | DIASTOLIC BLOOD PRESSURE: 74 MMHG | OXYGEN SATURATION: 100 % | HEIGHT: 64 IN

## 2022-03-07 DIAGNOSIS — R00.2 PALPITATIONS: ICD-10-CM

## 2022-03-07 DIAGNOSIS — E06.3 HYPOTHYROIDISM DUE TO HASHIMOTO'S THYROIDITIS: Primary | ICD-10-CM

## 2022-03-07 DIAGNOSIS — E03.8 HYPOTHYROIDISM DUE TO HASHIMOTO'S THYROIDITIS: Primary | ICD-10-CM

## 2022-03-07 PROCEDURE — 99205 OFFICE O/P NEW HI 60 MIN: CPT | Mod: S$GLB,,, | Performed by: INTERNAL MEDICINE

## 2022-03-07 PROCEDURE — 1160F PR REVIEW ALL MEDS BY PRESCRIBER/CLIN PHARMACIST DOCUMENTED: ICD-10-PCS | Mod: CPTII,S$GLB,, | Performed by: INTERNAL MEDICINE

## 2022-03-07 PROCEDURE — 1159F PR MEDICATION LIST DOCUMENTED IN MEDICAL RECORD: ICD-10-PCS | Mod: CPTII,S$GLB,, | Performed by: INTERNAL MEDICINE

## 2022-03-07 PROCEDURE — 3078F PR MOST RECENT DIASTOLIC BLOOD PRESSURE < 80 MM HG: ICD-10-PCS | Mod: CPTII,S$GLB,, | Performed by: INTERNAL MEDICINE

## 2022-03-07 PROCEDURE — 1159F MED LIST DOCD IN RCRD: CPT | Mod: CPTII,S$GLB,, | Performed by: INTERNAL MEDICINE

## 2022-03-07 PROCEDURE — 99999 PR PBB SHADOW E&M-EST. PATIENT-LVL IV: ICD-10-PCS | Mod: PBBFAC,,, | Performed by: INTERNAL MEDICINE

## 2022-03-07 PROCEDURE — 1160F RVW MEDS BY RX/DR IN RCRD: CPT | Mod: CPTII,S$GLB,, | Performed by: INTERNAL MEDICINE

## 2022-03-07 PROCEDURE — 3008F BODY MASS INDEX DOCD: CPT | Mod: CPTII,S$GLB,, | Performed by: INTERNAL MEDICINE

## 2022-03-07 PROCEDURE — 3074F PR MOST RECENT SYSTOLIC BLOOD PRESSURE < 130 MM HG: ICD-10-PCS | Mod: CPTII,S$GLB,, | Performed by: INTERNAL MEDICINE

## 2022-03-07 PROCEDURE — 3008F PR BODY MASS INDEX (BMI) DOCUMENTED: ICD-10-PCS | Mod: CPTII,S$GLB,, | Performed by: INTERNAL MEDICINE

## 2022-03-07 PROCEDURE — 3074F SYST BP LT 130 MM HG: CPT | Mod: CPTII,S$GLB,, | Performed by: INTERNAL MEDICINE

## 2022-03-07 PROCEDURE — 99999 PR PBB SHADOW E&M-EST. PATIENT-LVL IV: CPT | Mod: PBBFAC,,, | Performed by: INTERNAL MEDICINE

## 2022-03-07 PROCEDURE — 3078F DIAST BP <80 MM HG: CPT | Mod: CPTII,S$GLB,, | Performed by: INTERNAL MEDICINE

## 2022-03-07 PROCEDURE — 99205 PR OFFICE/OUTPT VISIT, NEW, LEVL V, 60-74 MIN: ICD-10-PCS | Mod: S$GLB,,, | Performed by: INTERNAL MEDICINE

## 2022-03-07 NOTE — ASSESSMENT & PLAN NOTE
Clinically pt with sx's of both an under and overactive thyroid. Most recent TSH lower limit of normal. Reviewed pathophysiology of thyroid hormone. Reviewed that TSH is the most sensitive test of thyroid function. Since TSH is wnls, unclear if thyroid hormone is contributing to palpations. Discussed how NP thyroid has more active T3 which could exacerbate sx's. Pt recently cut back frequency of NP thyroid about 3 weeks ago (inadvertently took daily and omitted extra dose twice weekly). Recheck thyroid levels in 3 weeks to f/u TSH.  Adjust dose further prn.  Also reviewed potentially switching to T4 formulation only vs T4/cytomel combo. In the past did not feel well on T4 only.

## 2022-03-07 NOTE — ASSESSMENT & PLAN NOTE
Etiology unclear. Pt notes long hx of PVCs. TSH wnls but pt on NP thyroid which has more active T3 and could exacerbate sx's. Off BB per cards. Monitor.

## 2022-03-07 NOTE — PROGRESS NOTES
"      Subjective:    Patient ID:  Mayra Smart is a 56 y.o. female.    Chief Complaint:  Hypothyroidism      Pt presents to establish care for hypothyroidism and review of chronic medical conditions as listed in the visit diagnosis section of this encounter.    Pt is accompanied by her  who also provided some history.     Reviewed referring provider's last office note, Dr. Myers    Saw Dr. Sandra years ago.     Had COVID in OCT. Since then, notes PVCs. Started BB and felt even worse.    With regards to hypothyroidism:    Recalls not doing well on Synthroid alone. Prior endo added cytomel. Stayed on that combo for years. Notes Synthroid/Cytomel caused excessive notes hair loss.     Current medication:  Prescribde NP thyroid one tab daily except two tab M&Thr. However, pharmacy wrote the script wrong so she was taking one tablet daily for the last 3 weeks. Takes thyroid medication properly without food first thing in the morning. Has been on NP thyroid for the last 2-3 years.     Current symptoms:   fatigue +ve  SOB +ve  Cold intolerance +ve  Palpations +ve    No hoarseness, voice changes, dysphagia, compressive symptoms, or head/neck exposure to XRT. No personal or FH of thyroid cancer or MEN syndrome. Took steroids for COVID and felt bad.           Review of Systems   Respiratory: Positive for shortness of breath.    Cardiovascular: Positive for palpitations.        Past Medical History:   Diagnosis Date    Back pain     Bell's palsy     Breast disorder     fibercystic tissue    Fibroids     uterine    GERD (gastroesophageal reflux disease)     Headache     History of anesthesia reaction     sensitive to anesthesia, " Major hangover"    Neck pain     Pelvic pain in female     left lower quadrant    Preeclampsia       Social History     Tobacco Use    Smoking status: Never Smoker    Smokeless tobacco: Never Used   Substance Use Topics    Alcohol use: Yes     Comment: ocassionally    Drug " use: No     Family History   Problem Relation Age of Onset    Pulmonary embolism Mother     Breast cancer Neg Hx     Ovarian cancer Neg Hx       Past Surgical History:   Procedure Laterality Date    AUGMENTATION OF BREAST Left     removed     SECTION      times 2    DILATION AND CURETTAGE OF UTERUS  2007    ENDOMETRIAL ABLATION  10/22/2008    BTL          Current Outpatient Medications:     eletriptan (RELPAX) 40 MG tablet, TAKE 1 TABLET BY MOUTH AS NEEDED, MAY REPEAT IN 2 HOURS IF NECESSARY, Disp: 12 tablet, Rfl: 11    fremanezumab-vfrm (AJOVY AUTOINJECTOR) 225 mg/1.5 mL autoinjector, Inject 1.5 mLs (225 mg total) into the skin every 28 days., Disp: 1.5 mL, Rfl: 11    gabapentin (NEURONTIN) 300 MG capsule, Take 1 capsule (300 mg total) by mouth every evening., Disp: 30 capsule, Rfl: 11    MINIVELLE 0.05 mg/24 hr, , Disp: , Rfl:     NP THYROID 60 mg Tab, TAKE 2 TABLETS BY MOUTH ON MONDAY AND THURSDAY, THEN 1 TABLET BY MOUTH ON ALL OTHER DAYS (Patient taking differently: 1 tab a day), Disp: 120 tablet, Rfl: 1    ondansetron (ZOFRAN-ODT) 4 MG TbDL, Take 1 tablet (4 mg total) by mouth every 6 (six) hours as needed (nausea)., Disp: 30 tablet, Rfl: 11    progesterone (PROMETRIUM) 100 MG capsule, TK 1 C PO QHS, Disp: , Rfl: 6    VENTOLIN HFA 90 mcg/actuation inhaler, , Disp: , Rfl:     EPINEPHrine (EPIPEN) 0.3 mg/0.3 mL AtIn, Inject 0.3 mLs (0.3 mg total) into the muscle once as needed. (Patient not taking: Reported on 3/7/2022), Disp: 2 Device, Rfl: 3    metoprolol succinate (TOPROL-XL) 25 MG 24 hr tablet, Take 1 tablet (25 mg total) by mouth once daily. (Patient not taking: Reported on 3/7/2022), Disp: 30 tablet, Rfl: 11    metoprolol tartrate (LOPRESSOR) 50 MG tablet, 50 mg 2 (two) times daily., Disp: , Rfl:     prochlorperazine (COMPAZINE) 10 MG tablet, Take 1 tablet (10 mg total) by mouth 3 (three) times daily as needed (migraine or nausea). (Patient not taking: Reported on 3/7/2022),  "Disp: 20 tablet, Rfl: 11    Current Facility-Administered Medications:     onabotulinumtoxina injection 200 Units, 200 Units, Intramuscular, Q90 Days, Noelle Kowalski MD, 200 Units at 04/28/21 0826    onabotulinumtoxina injection 200 Units, 200 Units, Intramuscular, Q90 Days, Noelle Kowalski MD, 200 Units at 07/23/21 1016    onabotulinumtoxina injection 200 Units, 200 Units, Intramuscular, Q90 Days, Noelle Kowalski MD, 200 Units at 10/27/21 0908    onabotulinumtoxina injection 200 Units, 200 Units, Intramuscular, Q90 Days, Noelle Kowalski MD, 200 Units at 01/21/22 0925     Review of patient's allergies indicates:   Allergen Reactions    Cephalosporins Anaphylaxis    Milk containing products Anaphylaxis    Penicillins Anaphylaxis    Codeine Itching and Swelling        Objective:   /74   Pulse 63   Ht 5' 4" (1.626 m)   Wt 66.7 kg (147 lb 0.8 oz)   LMP 10/22/2008   SpO2 100%   BMI 25.24 kg/m²   BP Readings from Last 3 Encounters:   03/07/22 118/74   03/03/22 128/74   02/22/22 112/77     Wt Readings from Last 3 Encounters:   03/07/22 1334 66.7 kg (147 lb 0.8 oz)   03/03/22 1152 68.2 kg (150 lb 4 oz)   02/22/22 0857 67.1 kg (147 lb 14.9 oz)          Physical Exam  Vitals reviewed.   Constitutional:       General: She is not in acute distress.     Appearance: Normal appearance. She is well-developed. She is not ill-appearing, toxic-appearing or diaphoretic.   HENT:      Head: Normocephalic and atraumatic.   Eyes:      General: No scleral icterus.  Neck:      Trachea: No tracheal deviation.      Comments:  No thyromegaly or palpable thyroid nodules    Cardiovascular:      Rate and Rhythm: Normal rate and regular rhythm.   Pulmonary:      Effort: Pulmonary effort is normal. No respiratory distress.   Lymphadenopathy:      Cervical: No cervical adenopathy.   Neurological:      Mental Status: She is alert and oriented to person, place, and time.      Deep Tendon Reflexes: Reflexes " normal.      Comments: No tremor     Psychiatric:         Thought Content: Thought content normal.           Lab Results   Component Value Date     02/22/2022    K 3.8 02/22/2022     02/22/2022    CO2 24 02/22/2022    BUN 12 02/22/2022    CREATININE 0.8 02/22/2022    GLU 95 02/22/2022    HGBA1C 5.6 11/09/2021    AST 16 02/22/2022    ALT 13 02/22/2022    ALBUMIN 4.0 02/22/2022    PROT 7.2 02/22/2022    BILITOT 0.5 02/22/2022    WBC 4.02 02/22/2022    HGB 13.5 02/22/2022    HCT 40.7 02/22/2022    MCV 91 02/22/2022    MCH 30.1 02/22/2022     02/22/2022    MPV 9.6 02/22/2022    GRAN 2.3 02/22/2022    GRAN 58.2 02/22/2022    LYMPH 1.0 02/22/2022    LYMPH 25.9 02/22/2022    CHOL 204 (H) 02/22/2022    HDL 48 02/22/2022    LDLCALC 137.4 02/22/2022    TRIG 93 02/22/2022       Lab Results   Component Value Date    TSH 0.552 02/22/2022    FREET4 0.66 (L) 02/22/2022        Thyroid Labs Latest Ref Rng & Units 11/20/2012 11/9/2021 2/22/2022   TSH 0.400 - 4.000 uIU/mL - 1.150 0.552   Free T4 0.71 - 1.51 ng/dL - 0.77 0.66(L)   Sodium 136 - 145 mmol/L - 137 139   Potassium 3.5 - 5.1 mmol/L - 4.0 3.8   Chloride 95 - 110 mmol/L - 102 105   Carbon Dioxide 23 - 29 mmol/L - 26 24   Glucose 70 - 110 mg/dL - 91 95   Blood Urea Nitrogen 6 - 20 mg/dL - 11 12   Creatinine 0.5 - 1.4 mg/dL - 0.8 0.8   Calcium 8.7 - 10.5 mg/dL - 10.4 9.4   Total Protein 6.0 - 8.4 g/dL - 8.0 7.2   Albumin 3.5 - 5.2 g/dL - 4.6 4.0   Total Bilirubin 0.1 - 1.0 mg/dL - 0.5 0.5   AST 10 - 40 U/L - 20 16   ALT 10 - 44 U/L - 18 13   Anion Gap 8 - 16 mmol/L - 9 10   eGFR (African American) >60 mL/min/1.73 m:2 - >60.0 >60.0   eGFR (Non-African American) >60 mL/min/1.73 m:2 - >60.0 >60.0   WBC 3.90 - 12.70 K/uL 6.73 4.18 4.02   RBC 4.00 - 5.40 M/uL 3.71(L) 4.63 4.48   Hemoglobin 12.0 - 16.0 g/dL 11.1(L) 14.1 13.5   Hematocrit 37.0 - 48.5 % 32.7(L) 42.6 40.7   MCV 82 - 98 fL 88.1 92 91   MCH 27.0 - 31.0 pg 29.9 30.5 30.1   MCHC 32.0 - 36.0 g/dL 33.9 33.1  33.2   RDW 11.5 - 14.5 % 12.6 12.7 12.3   Platelets 150 - 450 K/uL 231 360 303   MPV 9.2 - 12.9 fL 10.8 9.7 9.6   Gran # 1.8 - 7.7 K/uL 5.1 2.4 2.3   Lymph # 1.0 - 4.8 K/uL 1.1 1.3 1.0   Mono # 0.3 - 1.0 K/uL 0.4 0.3 0.3   Eos # 0.0 - 0.5 K/uL 0.0 0.1 0.3   Baso # 0.00 - 0.20 K/uL 0.0 0.05 0.04   Gran % 38.0 - 73.0 % 76.4(H) 57.0 58.2   Lymph % 18.0 - 48.0 % 16.9(L) 31.8 25.9   Mono% 4.0 - 15.0 % 6.4 6.5 7.5   Eos % 0.0 - 8.0 % 0.1 3.3 7.2   Baso % 0.0 - 1.9 % 0.1 1.2 1.0   Thyroperoxidase Antibodies <6.0 IU/mL - 523.4(H) -           Hemoglobin A1C   Date Value Ref Range Status   11/09/2021 5.6 4.0 - 5.6 % Final     Comment:     ADA Screening Guidelines:  5.7-6.4%  Consistent with prediabetes  >or=6.5%  Consistent with diabetes    High levels of fetal hemoglobin interfere with the HbA1C  assay. Heterozygous hemoglobin variants (HbS, HgC, etc)do  not significantly interfere with this assay.   However, presence of multiple variants may affect accuracy.           Assessment and plan:     Problem List Items Addressed This Visit        Cardiac/Vascular    Palpitations     Etiology unclear. Pt notes long hx of PVCs. TSH wnls but pt on NP thyroid which has more active T3 and could exacerbate sx's. Off BB per cards. Monitor.              Endocrine    Hypothyroidism due to Hashimoto's thyroiditis - Primary     Clinically pt with sx's of both an under and overactive thyroid. Most recent TSH lower limit of normal. Reviewed pathophysiology of thyroid hormone. Reviewed that TSH is the most sensitive test of thyroid function. Since TSH is wnls, unclear if thyroid hormone is contributing to palpations. Discussed how NP thyroid has more active T3 which could exacerbate sx's. Pt recently cut back frequency of NP thyroid about 3 weeks ago (inadvertently took daily and omitted extra dose twice weekly). Recheck thyroid levels in 3 weeks to f/u TSH.  Adjust dose further prn.  Also reviewed potentially switching to T4 formulation only vs  T4/cytomel combo. In the past did not feel well on T4 only.               Relevant Orders    TSH    T4, Free           Spent 60 minutes on patient care including but not limited to preparing to see patient, obtaining/reviewing history, documenting clinical info in EMR, face-to-face time, care coordination, ordering meds/labs for visit.    Labs in 3 weeks    RTC in 1 year            Disclaimer: This note has been generated using voice-recognition software. There may be typographical errors that have been missed during proof-reading.

## 2022-03-07 NOTE — ADDENDUM NOTE
Addended by: SANDIFER KUM-JI, JULIETTE on: 3/7/2022 04:23 PM     Modules accepted: Level of Service

## 2022-03-28 ENCOUNTER — LAB VISIT (OUTPATIENT)
Dept: LAB | Facility: HOSPITAL | Age: 57
End: 2022-03-28
Attending: INTERNAL MEDICINE
Payer: COMMERCIAL

## 2022-03-28 DIAGNOSIS — E03.8 HYPOTHYROIDISM DUE TO HASHIMOTO'S THYROIDITIS: ICD-10-CM

## 2022-03-28 DIAGNOSIS — E06.3 HYPOTHYROIDISM DUE TO HASHIMOTO'S THYROIDITIS: ICD-10-CM

## 2022-03-28 LAB
T4 FREE SERPL-MCNC: 0.73 NG/DL (ref 0.71–1.51)
TSH SERPL DL<=0.005 MIU/L-ACNC: 0.22 UIU/ML (ref 0.4–4)

## 2022-03-28 PROCEDURE — 84439 ASSAY OF FREE THYROXINE: CPT | Performed by: INTERNAL MEDICINE

## 2022-03-28 PROCEDURE — 36415 COLL VENOUS BLD VENIPUNCTURE: CPT | Mod: PO | Performed by: INTERNAL MEDICINE

## 2022-03-28 PROCEDURE — 84443 ASSAY THYROID STIM HORMONE: CPT | Performed by: INTERNAL MEDICINE

## 2022-04-01 ENCOUNTER — TELEPHONE (OUTPATIENT)
Dept: ENDOCRINOLOGY | Facility: CLINIC | Age: 57
End: 2022-04-01
Payer: COMMERCIAL

## 2022-04-01 DIAGNOSIS — E03.8 HYPOTHYROIDISM DUE TO HASHIMOTO'S THYROIDITIS: Primary | ICD-10-CM

## 2022-04-01 DIAGNOSIS — E06.3 HYPOTHYROIDISM DUE TO HASHIMOTO'S THYROIDITIS: Primary | ICD-10-CM

## 2022-04-01 NOTE — TELEPHONE ENCOUNTER
Pt advised of your message about the nutrafol.  She wanted to mention to you she has been more tired than usual.  Will hold the nutrafol a week prior to the repeat labs.  She's asking when can she repeat them?

## 2022-04-01 NOTE — TELEPHONE ENCOUNTER
S/w pt. States she has been taking same dose as directed at the visit. Pt has been taking one 60mg tablet of NP thyroid once daily. States she also takes a hair vitamin called Nutrafol. Please advise.

## 2022-04-01 NOTE — TELEPHONE ENCOUNTER
Labs don't make sense.    Confirm with me what dose/frequency of thyroid hormone she has been taking since office visit 3/7.     Any biotin use?    Prior to office visit 3/7 I know there was some confusion about what was prescribed versus what she was actually taking. She was prescribed NP thyroid one tab W,F, Sat, Sun and two tabs on Mon and Thursday but per pt pharmacy wrote script wrong and she was really taking one tablet every day.

## 2022-04-04 NOTE — TELEPHONE ENCOUNTER
S/w pt. Advised to hold nutrafol. Pt will stop starting tomorrow. Scheduled labs for next Wed. Pt verbalized understanding.

## 2022-04-07 ENCOUNTER — TELEPHONE (OUTPATIENT)
Dept: NEUROLOGY | Facility: CLINIC | Age: 57
End: 2022-04-07
Payer: COMMERCIAL

## 2022-04-07 ENCOUNTER — PATIENT MESSAGE (OUTPATIENT)
Dept: NEUROLOGY | Facility: CLINIC | Age: 57
End: 2022-04-07
Payer: COMMERCIAL

## 2022-04-07 NOTE — TELEPHONE ENCOUNTER
Called patient, no answer, left message that DR Kowalski did not have any other openings but if she wanted to come in and wait if someone cancels then can be worked in.

## 2022-04-07 NOTE — TELEPHONE ENCOUNTER
Spoke with pt and let pt know it is okay to come early. Pt understood and verbalized understanding.----- Message from Jaycob Escobar sent at 4/7/2022  4:50 PM CDT -----  Regarding: ret call  Type:  Patient Returning Call    Who Called:  juan j    Who Left Message for Patient:  alfestus    Does the patient know what this is regarding?:  yes    Best Call Back Number:  [phone] (home)     Additional Information:  pt will be there from 8-8:40. May need to rescheudle.

## 2022-04-07 NOTE — TELEPHONE ENCOUNTER
----- Message from Jaycob Escobar sent at 4/7/2022 12:20 PM CDT -----  Regarding: reschedule botox  Type: Needs Medical Advice    Who Called:  Mayralos Mccabe Call Back Number: [phone]     Additional Information: pt thought appt was at 8:15am. Would like to come earlier b/c of work. Please call to discuss rescheduling.

## 2022-04-08 ENCOUNTER — PROCEDURE VISIT (OUTPATIENT)
Dept: NEUROLOGY | Facility: CLINIC | Age: 57
End: 2022-04-08
Payer: COMMERCIAL

## 2022-04-08 VITALS
BODY MASS INDEX: 25.1 KG/M2 | HEIGHT: 64 IN | WEIGHT: 147 LBS | DIASTOLIC BLOOD PRESSURE: 92 MMHG | SYSTOLIC BLOOD PRESSURE: 146 MMHG | RESPIRATION RATE: 17 BRPM | HEART RATE: 88 BPM | TEMPERATURE: 98 F

## 2022-04-08 DIAGNOSIS — G43.719 INTRACTABLE CHRONIC MIGRAINE WITHOUT AURA AND WITHOUT STATUS MIGRAINOSUS: Primary | ICD-10-CM

## 2022-04-08 PROCEDURE — 64615 PR CHEMODENERVATION OF MUSCLE FOR CHRONIC MIGRAINE: ICD-10-PCS | Mod: S$GLB,,, | Performed by: PSYCHIATRY & NEUROLOGY

## 2022-04-08 PROCEDURE — 64615 CHEMODENERV MUSC MIGRAINE: CPT | Mod: S$GLB,,, | Performed by: PSYCHIATRY & NEUROLOGY

## 2022-04-08 NOTE — PROCEDURES
Procedures  BOTOX PROCEDURE    PROCEDURE PERFORMED: Botulinum toxin injection (86861)    CLINICAL INDICATION: G43.719    A time out was conducted just before the start of the procedure to verify the correct patient and procedure, procedure location, and all relevant critical information.   The patient meets criteria for chronic headaches according to the ICHD-II, the patient has more than 15 headaches a month out of at least 8 are migraines which last for more than 4 hours a day.    The Botox injections have achieved well over 50%  improvement in the patient's symptoms. Migraines have been reduced at least 7 days per month and the number of cumulative hours suffering with headaches has been reduced at least 100 total hours per month. Today she does have a headache indicating that the Botox has worn off. Frequency of treatment is every 3 months unless no response to the treatments, at which time we will discontinue the injections.      DESCRIPTION OF PROCEDURE: After obtaining informed consent and under aseptic technique, a total of 175 units of botulinum toxin type A were injected in the following muscles: Procerus 5 units,  5 units bilaterally, frontalis 20 units, temporalis 20 units bilaterally, occipitalis 15 units, upper cervical paraspinals 10 units bilaterally, Temporalis 10 units bilaterally and trapezius 15 units bilaterally. The patient was given a total of 175 units in 31 sites.The patient tolerated the procedure well. There were no complications. The patient was given a prescription for repeat treatment in 3 months.     Unavoidable waste 25 units      Hemanth Kowalski M.D  Medical Director, Headache and Facial Pain  [facility]

## 2022-04-13 ENCOUNTER — LAB VISIT (OUTPATIENT)
Dept: LAB | Facility: HOSPITAL | Age: 57
End: 2022-04-13
Attending: INTERNAL MEDICINE
Payer: COMMERCIAL

## 2022-04-13 ENCOUNTER — PATIENT MESSAGE (OUTPATIENT)
Dept: PODIATRY | Facility: CLINIC | Age: 57
End: 2022-04-13
Payer: COMMERCIAL

## 2022-04-13 ENCOUNTER — TELEPHONE (OUTPATIENT)
Dept: ENDOCRINOLOGY | Facility: CLINIC | Age: 57
End: 2022-04-13
Payer: COMMERCIAL

## 2022-04-13 DIAGNOSIS — E03.8 HYPOTHYROIDISM DUE TO HASHIMOTO'S THYROIDITIS: Primary | ICD-10-CM

## 2022-04-13 DIAGNOSIS — E06.3 HYPOTHYROIDISM DUE TO HASHIMOTO'S THYROIDITIS: Primary | ICD-10-CM

## 2022-04-13 DIAGNOSIS — E06.3 HYPOTHYROIDISM DUE TO HASHIMOTO'S THYROIDITIS: ICD-10-CM

## 2022-04-13 DIAGNOSIS — M72.2 PLANTAR FASCIITIS OF LEFT FOOT: ICD-10-CM

## 2022-04-13 DIAGNOSIS — E03.8 HYPOTHYROIDISM DUE TO HASHIMOTO'S THYROIDITIS: ICD-10-CM

## 2022-04-13 DIAGNOSIS — M72.2 PLANTAR FASCIITIS OF RIGHT FOOT: Primary | ICD-10-CM

## 2022-04-13 LAB
T4 FREE SERPL-MCNC: 0.65 NG/DL (ref 0.71–1.51)
TSH SERPL DL<=0.005 MIU/L-ACNC: 0.5 UIU/ML (ref 0.4–4)

## 2022-04-13 PROCEDURE — 84443 ASSAY THYROID STIM HORMONE: CPT | Performed by: INTERNAL MEDICINE

## 2022-04-13 PROCEDURE — 84439 ASSAY OF FREE THYROXINE: CPT | Performed by: INTERNAL MEDICINE

## 2022-04-13 PROCEDURE — 36415 COLL VENOUS BLD VENIPUNCTURE: CPT | Mod: PO | Performed by: INTERNAL MEDICINE

## 2022-04-13 NOTE — TELEPHONE ENCOUNTER
TSH wnls.    How is pt doing clinically? If feeling ok, should continue same dose of thyroid hormone.    Will plan to check Ft4 by dialysis now since FT4 was low.

## 2022-04-14 ENCOUNTER — LAB VISIT (OUTPATIENT)
Dept: LAB | Facility: HOSPITAL | Age: 57
End: 2022-04-14
Attending: INTERNAL MEDICINE
Payer: COMMERCIAL

## 2022-04-14 ENCOUNTER — PATIENT MESSAGE (OUTPATIENT)
Dept: ENDOCRINOLOGY | Facility: CLINIC | Age: 57
End: 2022-04-14
Payer: COMMERCIAL

## 2022-04-14 ENCOUNTER — TELEPHONE (OUTPATIENT)
Dept: ENDOCRINOLOGY | Facility: CLINIC | Age: 57
End: 2022-04-14
Payer: COMMERCIAL

## 2022-04-14 ENCOUNTER — PATIENT MESSAGE (OUTPATIENT)
Dept: PODIATRY | Facility: CLINIC | Age: 57
End: 2022-04-14
Payer: COMMERCIAL

## 2022-04-14 DIAGNOSIS — E06.3 HYPOTHYROIDISM DUE TO HASHIMOTO'S THYROIDITIS: ICD-10-CM

## 2022-04-14 DIAGNOSIS — E03.8 HYPOTHYROIDISM DUE TO HASHIMOTO'S THYROIDITIS: ICD-10-CM

## 2022-04-14 PROCEDURE — 36415 COLL VENOUS BLD VENIPUNCTURE: CPT | Mod: PO | Performed by: INTERNAL MEDICINE

## 2022-04-14 PROCEDURE — 84439 ASSAY OF FREE THYROXINE: CPT | Performed by: INTERNAL MEDICINE

## 2022-04-14 NOTE — TELEPHONE ENCOUNTER
----- Message from Zamzam Francis sent at 4/14/2022  9:55 AM CDT -----  Regarding: return call  Contact: Patient/[phone] (home)  Type:  Patient Returning Call    Who Called:  Patient/[phone] ([place])     Who Left Message for Patient:  [place]  Does the patient know what this is regarding?:  test results

## 2022-04-14 NOTE — TELEPHONE ENCOUNTER
"Pt will do FT4 direct dialysis today. States she is "feeling okay, but still tired in the evenings, not sure if that' r/t plantar fasciitis."   "

## 2022-04-20 ENCOUNTER — PATIENT MESSAGE (OUTPATIENT)
Dept: ENDOCRINOLOGY | Facility: CLINIC | Age: 57
End: 2022-04-20
Payer: COMMERCIAL

## 2022-04-20 ENCOUNTER — TELEPHONE (OUTPATIENT)
Dept: ENDOCRINOLOGY | Facility: CLINIC | Age: 57
End: 2022-04-20
Payer: COMMERCIAL

## 2022-04-20 DIAGNOSIS — R94.6 NONSPECIFIC ABNORMAL RESULTS OF THYROID FUNCTION STUDY: ICD-10-CM

## 2022-04-20 DIAGNOSIS — E03.8 ADULT ONSET HYPOTHYROIDISM: ICD-10-CM

## 2022-04-20 LAB — T4 FREE SERPL DIALY-MCNC: 0.9 NG/DL (ref 0.8–2)

## 2022-04-20 RX ORDER — THYROID 60 MG/1
TABLET ORAL
Qty: 90 TABLET | Refills: 3 | Status: SHIPPED | OUTPATIENT
Start: 2022-04-20 | End: 2023-03-13 | Stop reason: SDUPTHER

## 2022-04-20 NOTE — TELEPHONE ENCOUNTER
FT4 by dialysis wnls. Expect that T4 levels might be low/low nl with NP thyroid. Continue current dose of NP thyroid one tab daily.

## 2022-04-22 ENCOUNTER — CLINICAL SUPPORT (OUTPATIENT)
Dept: REHABILITATION | Facility: HOSPITAL | Age: 57
End: 2022-04-22
Attending: PODIATRIST
Payer: COMMERCIAL

## 2022-04-22 DIAGNOSIS — M72.2 PLANTAR FASCIITIS OF RIGHT FOOT: ICD-10-CM

## 2022-04-22 DIAGNOSIS — M72.2 PLANTAR FASCIITIS OF LEFT FOOT: ICD-10-CM

## 2022-04-22 PROCEDURE — 97140 MANUAL THERAPY 1/> REGIONS: CPT | Mod: PO

## 2022-04-22 PROCEDURE — 97110 THERAPEUTIC EXERCISES: CPT | Mod: PO

## 2022-04-22 PROCEDURE — 97161 PT EVAL LOW COMPLEX 20 MIN: CPT | Mod: PO

## 2022-04-22 NOTE — PLAN OF CARE
[facility]   Physical Therapy Initial Evaluation     Date: 4/22/2022   Name: Mayra Smart  [facility] Number: [id number]    Therapy Diagnosis:   Encounter Diagnoses   Name Primary?    Plantar fasciitis of right foot     Plantar fasciitis of left foot      Physician: Rosalio Campuzano DPM    Physician Orders: PT Eval and Treat   Medical Diagnosis from Referral:   M72.2 (ICD-10-CM) - Plantar fasciitis of right foot   M72.2 (ICD-10-CM) - Plantar fasciitis of left foot       Evaluation Date: 4/22/2022  Authorization Period Expiration: 12/31/2022  Plan of Care Expiration: 07/22/2022  Progress Note Due: 6th visit or 05/22/2022  Visit # / Visits authorized: 1/ 20   FOTO: Completed on 04/22/2022     Precautions: Standard    Time In: 1400  Time Out: 1500  Total Appointment Time (timed & untimed codes): 60 minutes      SUBJECTIVE   Date of onset: November 2021    History of current condition - Mayra reports:   She was on vacation around [facility] and did a lot of walking in sandals. She started to get pain in both heels that has stayed fairly severe on the left, getting better on the right. The pain can be dead center in the heels, along the medial side of the heel, or along the bottom of the foot. She also has occasional bilateral pain that can shoot down her legs (left>right).    Patient reports reaction to Covid booster in October 2021 which resulted in decreased BP. The medication she was prescribed caused some vertigo/dizziness. This all combined to force her to stop her normal swimming routine. She normally would swim and walk 3-4x/week. She has not been able to do either since then so her overall activity is way down.    She has pain and tightness in the feet with initial weight bearing and with extended standing/walking during the day. 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 Patient reports occasional neuropathy in the bottom of  the feet with a burning/hot sensation. Most pronounced at end of the day or first thing in the morining.    She has been seeing Podiatry and received multiple injections in both feet with temporary decrease in symptoms for a couple weeks at a time. She tried an oral steroid taper, but was having some cardiac side effects so had to discontinue. She has also tried wearing soft night splints which were not helpful. She also tried acupuncture and e-stim (most helpful).     Patient is wearing [place] shoes or supportive slippers (Birkenstocks, [place]) while in the house. She performs figure-4 stretches, calf stretches on a step, and ice massage to the bottom of her feet.    Patient reports history of lumbar disc herniations in 2010 which were treated with CARMEN and PT with good relief.      Prior Therapy: None  Social History: Lives with spouse  Occupation: Teacher  Prior Level of Function: Walking, swimming without difficulty  Current Level of Function: Unable to walk/stand without pain. Has not been able to swim since October.    Pain:  Current 5/10, worst 9/10, best 3/10   Location: bilateral feet   Description: Burning, Tight, Sharp, Shooting and Variable  Aggravating Factors: Standing, Walking, Morning and Getting out of bed/chair  Easing Factors: pain medication, ice, injection and rest    Red Flag Screening:   Cough  Sneeze  Strain: (--)  Bladder/ bowel: (--)  Falls: (--)  Night pain: (--)  Unexplained weight loss: (--)  General health: Good    Patients goals: Return to normal swimming, walking.     Imaging, X-ray 01/24/2022  FINDINGS:  There is right Achilles insertion calcaneal enthesophyte formation.  There is bilateral plantar calcaneal spur formation, right greater than left.  There is bunion formation at the great toe metatarsal heads.  No fracture, dislocation, or osseous destructive process.  There is degenerative change of the interphalangeal joints of both feet.    Medical History:   Past Medical History:  "  Diagnosis Date    Back pain     Bell's palsy     Breast disorder     fibercystic tissue    Fibroids     uterine    GERD (gastroesophageal reflux disease)     Headache     History of anesthesia reaction     sensitive to anesthesia, " Major hangover"    Neck pain     Pelvic pain in female     left lower quadrant    Preeclampsia        Surgical History:   Mayra Smart  has a past surgical history that includes  section; Endometrial ablation (10/22/2008); Dilation and curettage of uterus (2007); and Augmentation of breast (Left).    Medications:   Mayra has a current medication list which includes the following prescription(s): eletriptan, epinephrine, ajovy autoinjector, gabapentin, minivelle, ondansetron, prochlorperazine, progesterone, thyroid (pork), and ventolin hfa, and the following Facility-Administered Medications: onabotulinumtoxina, onabotulinumtoxina, onabotulinumtoxina, and onabotulinumtoxina.    Allergies:   Review of patient's allergies indicates:   Allergen Reactions    Cephalosporins Anaphylaxis    Milk containing products Anaphylaxis    Penicillins Anaphylaxis    Codeine Itching and Swelling          OBJECTIVE     Antalgic gait with decreased stance time/toe-off left>right.    Functional Screen:     SFMA FN: functional, nonpainful. FP: functional, painful. DP: dysfunctional, painful. DN: dysfunctional, nonpainful.   AROM cervical flexion Dysfunctional, non-painful   AROM cervical extension Dysfunctional, non-painful   AROM cervical rotation R: Functional, non-painful  L: Functional, non-painful   AROM Shoulder ER R: Functional, non-painful  L: Functional, non-painful   AROM Shoulder IR R: Functional, non-painful  L: Functional, non-painful   multi-segmental flexion  Dysfunctional, non-painful   multi-segmental extension Dysfunctional, non-painful   multi-segmental rotation  R: Dysfunctional, non-painful  L: Dysfunctional, non-painful   SLS R: Dysfunctional, " non-painful  L: Dysfunctional, painful   Arms Down Deep Squat Dysfunctional, painful       Myotome/Motor Control Testing:  Myotome Peripheral Right Left   L2 (Hip Flexion) Segmental L1, L2 negative negative     L3 (Hip IR) Obturator negative   negative     L3 (Knee Extension) Femoral negative negative   L4 (Ankle DF) Deep Fibular  negative   negative     L5 (FHL) Deep Fibular negative positive   L5 (Hip ABduction) Superior Gluteal  negative   positive     S1 (Knee Flexion) Tibial negative   positive     S2 (Hip Extension) Inferior Gluteal negative   positive         Hip, knee range of motion WNL, symmetrical      Ankle Range of Motion PROM (deg):    Right Left   Dorsi Flexion 10, firm    7, firm painful   1st MTP Extension 85 75, firm     Lower Extremity Strength  (R) LE  (L) LE    Hip flexion: 4+/5 Hip flexion: 4+/5      Hip extension: 4+/5 Hip extension: 4/5   Hip Abduction: 4+/5    Hip abduction: 4/5      Hip ER 4/5    Hip ER 4+/5      Hip IR 4+/5    Hip IR 4/5      Knee flexion: 4+/5    Knee flexion: 4+/5      Knee extension: 5/5    Knee extension: 5/5      Ankle DF: 5/5    Ankle DF: 5/5      Ankle PF: NT, painful but able to perform full range in standing    Ankle PF: NT, painful with initial movement      Ankle INV: 4+/5    Ankle INV: 4/5      Ankle EV: 5/5    Ankle EV: 5/5      FHL: 4+/5    FHL: 4/5      Posterior Tib: 4+/5    Posterior Tib: 4/5        Special Tests:  SLR (-)   Slump/Nerve tension (+) left      Muscle Length:   Right Left   Quads (Ely test):  120 deg 120 deg   Hamstrings (90/90): (20) deg (30) deg   Hip flexors (Jaycob test): (+) psoas, (-) rectus femoris (+) psoas, (-) rectus femoris     Joint Mobility:  Lumbar: HYPOmobile and Focal pain  L4-S1  Ankle/Foot: HYPOmobile TC, navicular, cuboid, canceal left>right    Palpation: TTP left>right FHL, posterior tib tendons, soleus, plantarfascia, mid heel/fat pad. Non-tender achilles, peroneals.    Sensation: Intact to light touch over L3-S1  "dermatomes    Limitation/Restriction for FOTO Foot Survey    Therapist reviewed FOTO scores for Mayra Smart on 4/22/2022.   FOTO documents entered into [employer] - see Media section.    Limitation Score: 56%         TREATMENT     Total Treatment time (time-based codes) separate from Evaluation: 40 minutes     Mayra received the treatments listed below:      Mayra received therapeutic exercises to develop strength, ROM, flexibility and core stabilization for 25 minutes including:  Posterior pelvic tilts 2x20  Articulating bridge x15  Supine sciatic nerve glide x1 min bilaterally   Great toe flexion with red Theraband x15 bilaterally   Great toe flexion + ankle PF with red Theraband x15 bilaterally   Ankle inversion with red Theraband xx15 bilaterally     Cues to perform all movements and "stretches" with moderate intensity and to avoid pushing into >2-3/10 pain.    Mayra received the following manual therapy techniques: Joint mobilizations, Myofacial release and Soft tissue Mobilization were applied to the: bilateral lower extremity for 15 minutes, including:  Grade III/IV TC, tarsal, calcaneal, MT glides, 1st MTP to increase DF/INV/EV, mid-foot mobility  Soft tissue mobilization, cross friction massage gastroc/soelus, achilles      PATIENT EDUCATION AND HOME EXERCISES     Education provided:   - Presentation, prognosis, plan of care, HEP  - Resiliency of foot and importance of building dynamic stability and load tolerance    Written Home Exercises Provided: yes. Exercises were reviewed and Mayra was able to demonstrate them prior to the end of the session.  Mayra demonstrated good  understanding of the education provided. See EMR under Patient Instructions for exercises provided during therapy sessions.    Images copyright of [email or web address] or [email or web address]    ASSESSMENT     Mayra is a 56 y.o. female referred to outpatient Physical Therapy with a medical diagnosis of bilateral plantar " fasciitis. Patient presents with signs and symptoms supportive of referral diagnosis as well as increased nerve tension/dysfunction that is likely contributing to persistent symptoms especially in the left lower extremity. Patient demonstrates decreased joint mobility, decreased motor control, decreased strength, decreased muscle length/soft tissue extensibility, decreased balance. These impairments are limiting her standing, walking, transfers, swimming. As a result, she has increased difficulty performing her normal home, occupational, and recreational activities.    Patient prognosis is Good.     Patient will benefit from skilled outpatient Physical Therapy to address the deficits stated above and in the chart below, provide patient /family education, and to maximize patient's level of independence.     Plan of care discussed with patient: Yes  Patient's spiritual, cultural and educational needs considered and patient is agreeable to the plan of care and goals as stated below:     Anticipated Barriers for therapy: None    Medical Necessity is demonstrated by the following  History  Co-morbidities and personal factors that may impact the plan of care Co-morbidities:   HTN    Personal Factors:   no deficits     low   Examination  Body Structures and Functions, activity limitations and participation restrictions that may impact the plan of care Body Regions:   back  lower extremities    Body Systems:    ROM  strength  balance  gait  transfers  motor control    Participation Restrictions:   None    Activity limitations:   Learning and applying knowledge  no deficits    General Tasks and Commands  no deficits    Communication  no deficits    Mobility  walking    Self care  no deficits    Domestic Life  shopping  cooking  doing house work (cleaning house, washing dishes, laundry)    Interactions/Relationships  no deficits    Life Areas  no deficits    Community and Social Life  community life  recreation and leisure          moderate   Clinical Presentation stable and uncomplicated low   Decision Making/ Complexity Score: low     Goals:  Short Term Goals: 4 weeks   Patient will be independent with HEP for symptom management  Patient will increase range of motion >5 deg in BLE in all affected planes to improve functional mobility  Patient will increase strength of BLE by at least 1/2 grade via MMT testing to allow for improved performance of ADLs and daily functional tasks  Patient will be able to walk >20 min  Patient will return to swimming with <2-3/10 pain    Long Term Goals: 12 weeks   Patient will be independent with progressive HEP for continued strengthening to support return to previous level of function  Patient will have grossly symmetrical, pain-free range of motion for improved functional mobility  Patient will increase strength of BLE by at least 1 grade via MMT testing to allow for improved performance of ADLs and daily functional tasks  Patient will be able to stand >60 min  Patient will be able to walk >45 min  Patient will achieve <20% limitation as measured by the FOTO to demonstrate decreased functional disability    PLAN   Plan of care Certification: 4/22/2022 to 07/22/2022.    Outpatient Physical Therapy 1-2 times weekly for 12 weeks to include the following interventions: Aquatic Therapy, Electrical Stimulation Dry needling, Gait Training, Manual Therapy, Moist Heat/ Ice, Neuromuscular Re-ed, Patient Education, Therapeutic Activities and Therapeutic Exercise.     Luis Black, PT      I CERTIFY THE NEED FOR THESE SERVICES FURNISHED UNDER THIS PLAN OF TREATMENT AND WHILE UNDER MY CARE   Physician's comments:     Physician's Signature: ___________________________________________________

## 2022-04-25 NOTE — PATIENT INSTRUCTIONS
Access Code: [email or web address]  URL: [email or web address]  Date: 04/25/2022  Prepared by: Luis Black    Exercises  Supine Posterior Pelvic Tilt - 1 x daily - 7 x weekly - 2-3 sets - 10-15 reps - 5 hold - 5/10 intensity  Supine Bridge with Gluteal Set and Spinal Articulation - 1 x daily - 7 x weekly - 2-3 sets - 10-15 reps - 5 hold - 5/10 intensity  Supine Sciatic Nerve Glide - 1 x daily - 7 x weekly - 2-3 sets - 10-15 reps - 5 hold - 5/10 intensity  Great Toe Flexion with Resistance - 1 x daily - 7 x weekly - 2-3 sets - 10-15 reps - 5 hold - 5/10 intensity  Ankle and Toe Plantarflexion with Resistance - 1 x daily - 7 x weekly - 2-3 sets - 10-15 reps - 5 hold - 5/10 intensity  Seated Ankle Inversion with Resistance and Legs Crossed - 1 x daily - 7 x weekly - 2-3 sets - 10-15 reps - 5 hold - 5/10 intensity

## 2022-05-04 ENCOUNTER — CLINICAL SUPPORT (OUTPATIENT)
Dept: REHABILITATION | Facility: HOSPITAL | Age: 57
End: 2022-05-04
Attending: PODIATRIST
Payer: COMMERCIAL

## 2022-05-04 DIAGNOSIS — M72.2 PLANTAR FASCIITIS OF RIGHT FOOT: Primary | ICD-10-CM

## 2022-05-04 DIAGNOSIS — M72.2 PLANTAR FASCIITIS OF LEFT FOOT: ICD-10-CM

## 2022-05-04 PROCEDURE — 97110 THERAPEUTIC EXERCISES: CPT | Mod: PO

## 2022-05-04 PROCEDURE — 97140 MANUAL THERAPY 1/> REGIONS: CPT | Mod: PO

## 2022-05-04 NOTE — PROGRESS NOTES
"[facility]   Physical Therapy Treatment Note     [id number]    Therapy Diagnosis:   Encounter Diagnoses   Name Primary?    Plantar fasciitis of right foot Yes    Plantar fasciitis of left foot      Physician: Rosalio Campuzano DPM    Visit Date: 5/4/2022     Physician Orders: PT Eval and Treat   Medical Diagnosis from Referral:   M72.2 (ICD-10-CM) - Plantar fasciitis of right foot   M72.2 (ICD-10-CM) - Plantar fasciitis of left foot         Evaluation Date: 4/22/2022  Authorization Period Expiration: 12/31/2022  Plan of Care Expiration: 07/22/2022  Progress Note Due: 6th visit or 05/22/2022  Visit # / Visits authorized: 1/ 20   FOTO: Completed on 04/22/2022      Precautions: Standard        Time In: 0700  Time Out: 0800  Total Billable Time: 60 minutes      SUBJECTIVE     Pt reports: Has some relief with performance of HEP, but temporary and pain is consistent with weight bearing.    She was compliant with home exercise program.  Response to previous treatment: temporary decrease in symptoms  Functional change: Too soon to assess    Pain: 6/10  Location: left feet      OBJECTIVE     Objective Measures updated at progress report unless specified.     tender to palpation   TREATMENT     Total Treatment time (time-based codes) separate from Evaluation: 60 minutes     Mayra received the treatments listed below:    Mayra received therapeutic exercises to develop strength, ROM, flexibility and core stabilization for 15 minutes including:  Posterior pelvic tilts 2x20  Articulating bridge x15  Supine sciatic nerve glide x1 min bilaterally   Great toe flexion with red Theraband x20 bilaterally   Great toe flexion + ankle PF with red Theraband x20 bilaterally   Ankle inversion with red Theraband x20 bilaterally      Cues to perform all movements and "stretches" with moderate intensity and to avoid pushing into >2-3/10 pain.     Mayra received the " following manual therapy techniques: Joint mobilizations, Myofacial release and Soft tissue Mobilization were applied to the: bilateral lower extremity for 45 minutes, including:  Grade III/IV TC, tarsal, calcaneal, MT glides, 1st MTP to increase DF/INV/EV, mid-foot mobility  Soft tissue mobilization, cross friction massage gastroc/soelus, achilles  Functional Dry Needling:  Discussed the purpose, mechanism, and indications for functional dry needling with Mayra . Patient was cleared of all precautions and contraindications and patient signed written consent and gave verbal consent to dry needling Rx today.     Palpation, myotome testing, and physical exam used to determine dry needling treatment sites.     Patient received dry needling to the following areas:  L5-S2 multifidi, left soleus/gastroc    Patient tolerated the treatment well and demonstrated decreased pressure sensitivity in the lumbar paravertebral muscles and left calf following treatment. Slight decreased in symptoms with gait and static standing.         PATIENT EDUCATION AND HOME EXERCISES     Home Exercises Provided and Patient Education Provided     Education provided:   - Presentation, prognosis, plan of care, HEP  - Dry needling indications, contraindications    Written Home Exercises Provided: yes. Exercises were reviewed and Mayra was able to demonstrate them prior to the end of the session.  Mayra demonstrated good  understanding of the education provided. See EMR under Patient Instructions for exercises provided during therapy sessions    ASSESSMENT     Patient tolerated functional dry needling well with decreased pressure sensitivity to her low back and left calf and foot. She demonstrated good understanding of her HEP and was able to fatigue target muscles with min/mod irritation to her left foot. Patient would continue to benefit from skilled PT to address lumbosacral nerve dysfunction which is likely contributing to her  left>right lower extremity symptoms.    Mayra Is progressing well towards her goals.   Pt prognosis is Good.     Pt will continue to benefit from skilled outpatient physical therapy to address the deficits listed in the problem list box on initial evaluation, provide pt/family education and to maximize pt's level of independence in the home and community environment.     Pt's spiritual, cultural and educational needs considered and pt agreeable to plan of care and goals.     Anticipated barriers to physical therapy: None  Goals:   Short Term Goals: 4 weeks   Patient will be independent with HEP for symptom management  Patient will increase range of motion >5 deg in BLE in all affected planes to improve functional mobility  Patient will increase strength of BLE by at least 1/2 grade via MMT testing to allow for improved performance of ADLs and daily functional tasks  Patient will be able to walk >20 min  Patient will return to swimming with <2-3/10 pain     Long Term Goals: 12 weeks   Patient will be independent with progressive HEP for continued strengthening to support return to previous level of function  Patient will have grossly symmetrical, pain-free range of motion for improved functional mobility  Patient will increase strength of BLE by at least 1 grade via MMT testing to allow for improved performance of ADLs and daily functional tasks  Patient will be able to stand >60 min  Patient will be able to walk >45 min  Patient will achieve <20% limitation as measured by the FOTO to demonstrate decreased functional disability    PLAN     Continue dry needling as indicated. Lumbar stability - cat/cow, bird dog, dead bug. Posterior tibialis, Soleus strengthening.    Luis Black, PT

## 2022-05-06 ENCOUNTER — CLINICAL SUPPORT (OUTPATIENT)
Dept: REHABILITATION | Facility: HOSPITAL | Age: 57
End: 2022-05-06
Attending: PODIATRIST
Payer: COMMERCIAL

## 2022-05-06 DIAGNOSIS — M72.2 PLANTAR FASCIITIS OF RIGHT FOOT: Primary | ICD-10-CM

## 2022-05-06 DIAGNOSIS — M72.2 PLANTAR FASCIITIS OF LEFT FOOT: ICD-10-CM

## 2022-05-06 PROCEDURE — 97140 MANUAL THERAPY 1/> REGIONS: CPT | Mod: PO

## 2022-05-06 PROCEDURE — 97110 THERAPEUTIC EXERCISES: CPT | Mod: PO

## 2022-05-06 NOTE — PROGRESS NOTES
[facility]   Physical Therapy Treatment Note     [id number]    Therapy Diagnosis:   Encounter Diagnoses   Name Primary?    Plantar fasciitis of right foot Yes    Plantar fasciitis of left foot      Physician: Rosalio Campuzano DPM    Visit Date: 5/6/2022     Physician Orders: PT Eval and Treat   Medical Diagnosis from Referral:   M72.2 (ICD-10-CM) - Plantar fasciitis of right foot   M72.2 (ICD-10-CM) - Plantar fasciitis of left foot         Evaluation Date: 4/22/2022  Authorization Period Expiration: 12/31/2022  Plan of Care Expiration: 07/22/2022  Progress Note Due: 6th visit or 05/22/2022  Visit # / Visits authorized: 1/ 20   FOTO: Completed on 04/22/2022      Precautions: Standard    Time In: 0800  Time Out: 0900  Total Billable Time: 60 minutes      SUBJECTIVE     Pt reports: Had good relief after last session, but returned by Thursday night. HEP is helpful to reduce severity of symptoms.    She was compliant with home exercise program.  Response to previous treatment: temporary decrease in symptoms  Functional change: Too soon to assess    Pain: 6/10  Location: left feet      OBJECTIVE     Objective Measures updated at progress report unless specified.     tender to palpation medial calcaneous, platarfascia  TREATMENT     Total Treatment time (time-based codes) separate from Evaluation: 60 minutes     Mayra received the treatments listed below:    Mayra received therapeutic exercises to develop strength, ROM, flexibility and core stabilization for 15 minutes including:  Posterior pelvic tilts 2x20  Articulating bridge x15  Supine sciatic nerve glide x1 min bilaterally   Great toe flexion with red Theraband x20 bilaterally   Great toe flexion + ankle PF with red Theraband x20 bilaterally   Ankle inversion with red Theraband x20 bilaterally   Heel raises 2x15  Soleus stretch bilaterally 2x30 sec      Cues to perform all movements and  ""stretches" with moderate intensity and to avoid pushing into >2-3/10 pain.     Mayra received the following manual therapy techniques: Joint mobilizations, Myofacial release and Soft tissue Mobilization were applied to the: bilateral lower extremity for 45 minutes, including:  Grade III/IV TC, tarsal, calcaneal, MT glides, 1st MTP to increase DF/INV/EV, mid-foot mobility  Soft tissue mobilization, cross friction massage gastroc/soelus, achilles  Functional Dry Needling:  Discussed the purpose, mechanism, and indications for functional dry needling with Mayra . Patient was cleared of all precautions and contraindications and patient signed written consent and gave verbal consent to dry needling Rx today.     Palpation, myotome testing, and physical exam used to determine dry needling treatment sites.     Patient received dry needling to the following areas:  L5-S2 multifidi, left soleus/gastroc, left posterior tib    Patient tolerated the treatment well and demonstrated decreased pressure sensitivity in the lumbar paravertebral muscles and left calf following treatment. Slight decreased in symptoms with gait and static standing.         PATIENT EDUCATION AND HOME EXERCISES     Home Exercises Provided and Patient Education Provided     Education provided:   - Presentation, prognosis, plan of care, HEP  - Dry needling indications, contraindications    Written Home Exercises Provided: yes. Exercises were reviewed and Mayra was able to demonstrate them prior to the end of the session.  Mayra demonstrated good  understanding of the education provided. See EMR under Patient Instructions for exercises provided during therapy sessions    ASSESSMENT     Patient continues to tolerate functional dry needling well with decreased pressure sensitivity to her low back and left calf and foot. She had decreased pain with initial standing, but no significant relief with gait in the clinic. She demonstrated good " understanding of her HEP and was able to fatigue target muscles with min/mod irritation to her left foot. Patient would continue to benefit from skilled PT to address lumbosacral nerve dysfunction which is likely contributing to her left>right lower extremity symptoms.    Mayra Is progressing well towards her goals.   Pt prognosis is Good.     Pt will continue to benefit from skilled outpatient physical therapy to address the deficits listed in the problem list box on initial evaluation, provide pt/family education and to maximize pt's level of independence in the home and community environment.     Pt's spiritual, cultural and educational needs considered and pt agreeable to plan of care and goals.     Anticipated barriers to physical therapy: None  Goals:   Short Term Goals: 4 weeks   Patient will be independent with HEP for symptom management  Patient will increase range of motion >5 deg in BLE in all affected planes to improve functional mobility  Patient will increase strength of BLE by at least 1/2 grade via MMT testing to allow for improved performance of ADLs and daily functional tasks  Patient will be able to walk >20 min  Patient will return to swimming with <2-3/10 pain     Long Term Goals: 12 weeks   Patient will be independent with progressive HEP for continued strengthening to support return to previous level of function  Patient will have grossly symmetrical, pain-free range of motion for improved functional mobility  Patient will increase strength of BLE by at least 1 grade via MMT testing to allow for improved performance of ADLs and daily functional tasks  Patient will be able to stand >60 min  Patient will be able to walk >45 min  Patient will achieve <20% limitation as measured by the FOTO to demonstrate decreased functional disability    PLAN     Continue dry needling as indicated. Lumbar stability - cat/cow, bird dog, dead bug. Posterior tibialis, Soleus strengthening.    Luis Black, PT

## 2022-05-09 ENCOUNTER — PATIENT MESSAGE (OUTPATIENT)
Dept: NEUROLOGY | Facility: CLINIC | Age: 57
End: 2022-05-09
Payer: COMMERCIAL

## 2022-05-09 ENCOUNTER — CLINICAL SUPPORT (OUTPATIENT)
Dept: REHABILITATION | Facility: HOSPITAL | Age: 57
End: 2022-05-09
Attending: PODIATRIST
Payer: COMMERCIAL

## 2022-05-09 DIAGNOSIS — M72.2 PLANTAR FASCIITIS OF RIGHT FOOT: Primary | ICD-10-CM

## 2022-05-09 DIAGNOSIS — M72.2 PLANTAR FASCIITIS OF LEFT FOOT: ICD-10-CM

## 2022-05-09 PROCEDURE — 97110 THERAPEUTIC EXERCISES: CPT | Mod: PO

## 2022-05-09 PROCEDURE — 97140 MANUAL THERAPY 1/> REGIONS: CPT | Mod: PO

## 2022-05-09 NOTE — PROGRESS NOTES
[facility]   Physical Therapy Treatment Note     Name: Mayra Rushal  [facility] Number: [id number]    Therapy Diagnosis:   Encounter Diagnoses   Name Primary?    Plantar fasciitis of right foot Yes    Plantar fasciitis of left foot      Physician: Rosalio Campuzano DPM    Visit Date: 5/9/2022     Physician Orders: PT Eval and Treat   Medical Diagnosis from Referral:   M72.2 (ICD-10-CM) - Plantar fasciitis of right foot   M72.2 (ICD-10-CM) - Plantar fasciitis of left foot         Evaluation Date: 4/22/2022  Authorization Period Expiration: 12/31/2022  Plan of Care Expiration: 07/22/2022  Progress Note Due: 6th visit or 05/22/2022  Visit # / Visits authorized: 4/ 20   FOTO: Completed on 04/22/2022      Precautions: Standard    Time In: 1500  Time Out: 1610  Total Billable Time: 60 minutes (10 min on ice)      SUBJECTIVE     Pt reports: Temporary decrease in pain after last session, but heel pain remains very limiting with all WB on the left foot. Decreased pain to the ball of the foot and lateral heel.    She was compliant with home exercise program.  Response to previous treatment: temporary decrease in symptoms  Functional change: Too soon to assess    Pain: 6/10  Location: left feet      OBJECTIVE     Objective Measures updated at progress report unless specified.     tender to palpation medial calcaneous, plantar fascia  Weak and fatiguable Left FHL, Post tib  TREATMENT     Total Treatment time (time-based codes) separate from Evaluation: 60 minutes     Mayra received the treatments listed below:    Mayra received therapeutic exercises to develop strength, ROM, flexibility and core stabilization for 15 minutes including:  Posterior pelvic tilts 2x20  Articulating bridge x15  Supine sciatic nerve glide x1 min bilaterally   Great toe flexion with blue Theraband x20 bilaterally   Great toe flexion + ankle PF with blue Theraband x20 bilaterally   Ankle inversion with blue  "Theraband x20 bilaterally   Heel raises 2x15  - cue with tennis ball  Toe yoga  Soleus stretch bilaterally 2x30 sec      Cues to perform all movements and "stretches" with moderate intensity and to avoid pushing into >2-3/10 pain.     Mayra received the following manual therapy techniques: Joint mobilizations, Myofacial release and Soft tissue Mobilization were applied to the: bilateral lower extremity for 45 minutes, including:  Grade III/IV TC, tarsal, calcaneal, MT glides, 1st MTP to increase DF/INV/EV, mid-foot mobility  Soft tissue mobilization, cross friction massage gastroc/soelus, achilles  Functional Dry Needling:  Discussed the purpose, mechanism, and indications for functional dry needling with Mayra . Patient was cleared of all precautions and contraindications and patient signed written consent and gave verbal consent to dry needling Rx today.     Palpation, myotome testing, and physical exam used to determine dry needling treatment sites.     Patient received dry needling to the following areas:  L5,S2 multifidi, left soleus/gastroc, left posterior tib/FHL    Patient tolerated the treatment well and demonstrated decreased pressure sensitivity in the lumbar paravertebral muscles and left calf following treatment. Slight decreased in symptoms with gait and static standing.         PATIENT EDUCATION AND HOME EXERCISES     Home Exercises Provided and Patient Education Provided     Education provided:   - Presentation, prognosis, plan of care, HEP  - Dry needling indications, contraindications    Written Home Exercises Provided: yes. Exercises were reviewed and Mayra was able to demonstrate them prior to the end of the session.  Mayra demonstrated good  understanding of the education provided. See EMR under Patient Instructions for exercises provided during therapy sessions    ASSESSMENT     Patient continues to tolerate functional dry needling well with decreased pressure sensitivity to her low " back and left calf and foot. She remains with relative weakness in FHL and posterior tib, likely contributing to overuse/strain of plantar fascia and heel. Patient would continue to benefit from skilled PT to address lumbosacral nerve dysfunction which is likely contributing to her left>right lower extremity symptoms.    Mayra Is progressing well towards her goals.   Pt prognosis is Good.     Pt will continue to benefit from skilled outpatient physical therapy to address the deficits listed in the problem list box on initial evaluation, provide pt/family education and to maximize pt's level of independence in the home and community environment.     Pt's spiritual, cultural and educational needs considered and pt agreeable to plan of care and goals.     Anticipated barriers to physical therapy: None  Goals:   Short Term Goals: 4 weeks   Patient will be independent with HEP for symptom management  Patient will increase range of motion >5 deg in BLE in all affected planes to improve functional mobility  Patient will increase strength of BLE by at least 1/2 grade via MMT testing to allow for improved performance of ADLs and daily functional tasks  Patient will be able to walk >20 min  Patient will return to swimming with <2-3/10 pain     Long Term Goals: 12 weeks   Patient will be independent with progressive HEP for continued strengthening to support return to previous level of function  Patient will have grossly symmetrical, pain-free range of motion for improved functional mobility  Patient will increase strength of BLE by at least 1 grade via MMT testing to allow for improved performance of ADLs and daily functional tasks  Patient will be able to stand >60 min  Patient will be able to walk >45 min  Patient will achieve <20% limitation as measured by the FOTO to demonstrate decreased functional disability    PLAN     Continue dry needling as indicated. Lumbar stability - cat/cow, bird dog, dead bug. Posterior  tibialis, Soleus strengthening.    Luis Black, PT

## 2022-05-10 RX ORDER — UBROGEPANT 100 MG/1
100 TABLET ORAL ONCE AS NEEDED
Qty: 16 TABLET | Refills: 11 | Status: SHIPPED | OUTPATIENT
Start: 2022-05-10 | End: 2022-12-26

## 2022-05-12 ENCOUNTER — PATIENT MESSAGE (OUTPATIENT)
Dept: PODIATRY | Facility: CLINIC | Age: 57
End: 2022-05-12
Payer: COMMERCIAL

## 2022-05-16 ENCOUNTER — TELEPHONE (OUTPATIENT)
Dept: ORTHOPEDICS | Facility: CLINIC | Age: 57
End: 2022-05-16
Payer: COMMERCIAL

## 2022-05-17 NOTE — TELEPHONE ENCOUNTER
----- Message from Terrance Olivares sent at 5/16/2022  5:55 PM CDT -----  Regarding: FW: perscribtion questions  Please see below and Advise. Patient's request from May 12th.         May 13, 2022         EZ    2:13 PM  Nicole Duke LPN routed this conversation to Rosalio Campuzano DPM     May 12, 2022    Berry Mayra  to Rosalio Campuzano DPM          5:06 PM  Physical therapy is helping, but I am still in a lot of pain - which gets worse as the day goes on.  I was wondering if their was a more powerful anti inflammatory - like Meloxicam or Celebrex that you could prescribe for me?    ----- Message -----  From: Augie Weiner  Sent: 5/16/2022   3:03 PM CDT  To: Justen Santamaria Staff  Subject: perscribtion questions                           hello i hv a pt here and she has been trying to get in touch w dr Campuzano abt getting an stronger anti inflammatory prescribed to her if some one could please give her a call ?Mayra Smart        Female, 56 y.o., 1965        [phone]  MRN:  [id number]

## 2022-05-17 NOTE — TELEPHONE ENCOUNTER
----- Message from Terrance Olivares sent at 5/16/2022  5:55 PM CDT -----  Regarding: FW: perscribtion questions  Please see below and Advise. Patient's request from May 12th.         May 13, 2022         EZ    2:13 PM  Nicole Duke LPN routed this conversation to Rosalio Campuzano DPM     May 12, 2022    Berry Mayra  to Rosalio Campuzano DPM          5:06 PM  Physical therapy is helping, but I am still in a lot of pain - which gets worse as the day goes on.  I was wondering if their was a more powerful anti inflammatory - like Meloxicam or Celebrex that you could prescribe for me?    ----- Message -----  From: Augie Weiner  Sent: 5/16/2022   3:03 PM CDT  To: Justen Santamaria Staff  Subject: perscribtion questions                           hello i hv a pt here and she has been trying to get in touch w dr Campuzano abt getting an stronger anti inflammatory prescribed to her if some one could please give her a call ?Mayra Smart        Female, 56 y.o., 1965        [phone]  MRN:  [id number]

## 2022-05-18 ENCOUNTER — TELEPHONE (OUTPATIENT)
Dept: ORTHOPEDICS | Facility: CLINIC | Age: 57
End: 2022-05-18
Payer: COMMERCIAL

## 2022-05-18 ENCOUNTER — CLINICAL SUPPORT (OUTPATIENT)
Dept: REHABILITATION | Facility: HOSPITAL | Age: 57
End: 2022-05-18
Attending: PODIATRIST
Payer: COMMERCIAL

## 2022-05-18 DIAGNOSIS — M72.2 PLANTAR FASCIITIS OF LEFT FOOT: ICD-10-CM

## 2022-05-18 DIAGNOSIS — M72.2 PLANTAR FASCIITIS OF RIGHT FOOT: Primary | ICD-10-CM

## 2022-05-18 PROCEDURE — 97140 MANUAL THERAPY 1/> REGIONS: CPT | Mod: PO

## 2022-05-18 PROCEDURE — 97110 THERAPEUTIC EXERCISES: CPT | Mod: PO

## 2022-05-18 RX ORDER — MELOXICAM 15 MG/1
15 TABLET ORAL DAILY
Qty: 21 TABLET | Refills: 0 | Status: SHIPPED | OUTPATIENT
Start: 2022-05-18 | End: 2022-06-20 | Stop reason: SDUPTHER

## 2022-05-18 NOTE — TELEPHONE ENCOUNTER
----- Message from Rosalio Campuzano DPM sent at 5/18/2022  1:01 PM CDT -----  Regarding: RE: perscribtion questions  Prescription for meloxicam sent in she cannot take OTC ibuprofen or aleve while in meloxicam  ----- Message -----  From: Terrance Olivares  Sent: 5/16/2022   5:57 PM CDT  To: Nicole Duke LPN, Rosalio Campuzano DPM, #  Subject: FW: perscribtion questions                       Please see below and Advise. Patient's request from May 12th.         May 13, 2022         EZ    2:13 PM  Nicole Duke LPN routed this conversation to Rosalio Campuzano DPM     May 12, 2022    OlmanMayra gifford  to Rosalio Campuzano DPM          5:06 PM  Physical therapy is helping, but I am still in a lot of pain - which gets worse as the day goes on.  I was wondering if their was a more powerful anti inflammatory - like Meloxicam or Celebrex that you could prescribe for me?    ----- Message -----  From: Augie Weiner  Sent: 5/16/2022   3:03 PM CDT  To: Justen Santamaria Staff  Subject: perscribtion questions                           hello i hv a pt here and she has been trying to get in touch w dr Campuzano abt getting an stronger anti inflammatory prescribed to her if some one could please give her a call ?Mayra Berry        Female, 56 y.o., 1965        [phone]  MRN:  [id number]

## 2022-05-18 NOTE — PROGRESS NOTES
[facility]   Physical Therapy Treatment Note     [id number]    Therapy Diagnosis:   Encounter Diagnoses   Name Primary?    Plantar fasciitis of right foot Yes    Plantar fasciitis of left foot      Physician: Rosalio Campuzano DPM    Visit Date: 5/18/2022     Physician Orders: PT Eval and Treat   Medical Diagnosis from Referral:   M72.2 (ICD-10-CM) - Plantar fasciitis of right foot   M72.2 (ICD-10-CM) - Plantar fasciitis of left foot         Evaluation Date: 4/22/2022  Authorization Period Expiration: 12/31/2022  Plan of Care Expiration: 07/22/2022  Progress Note Due: 6th visit or 05/22/2022  Visit # / Visits authorized: 5/ 20   FOTO: Completed on 04/22/2022      Precautions: Standard    Time In: 1500  Time Out: 1600  Total Billable Time: 60 minutes       SUBJECTIVE     Pt reports:Had significant decrease in foot symptoms the next morning. Lasted a couple hours. Now back to baseline pain with WB. Had some moderate low back soreness after last session and would like to pause on the dry needling in the low back today.    She was compliant with home exercise program.  Response to previous treatment: temporary decrease in symptoms  Functional change: Too soon to assess    Pain: 6/10  Location: left foot    OBJECTIVE     Objective Measures updated at progress report unless specified.     tender to palpation medial calcaneous, plantar fascia  Weak and fatiguable Left FHL, Post tib  TREATMENT     Total Treatment time (time-based codes) separate from Evaluation: 60 minutes     Mayra received the treatments listed below:    Mayra received therapeutic exercises to develop strength, ROM, flexibility and core stabilization for 15 minutes including:  Posterior pelvic tilts 2x20  Articulating bridge x15  Supine sciatic nerve glide x1 min bilaterally   Heel raises 2x15  - cue with tennis ball  Toe yoga x10 bilaterally   Soleus stretch bilaterally 2x30 sec  "     Cues to perform all movements and "stretches" with moderate intensity and to avoid pushing into >2-3/10 pain.     Mayra received the following manual therapy techniques: Joint mobilizations, Myofacial release and Soft tissue Mobilization were applied to the: bilateral lower extremity for 45 minutes, including:  Grade III/IV TC, tarsal, calcaneal, MT glides, 1st MTP to increase DF/INV/EV, mid-foot mobility  Soft tissue mobilization, cross friction massage gastroc/soelus, achilles  Functional Dry Needling:  Discussed the purpose, mechanism, and indications for functional dry needling with Mayra . Patient was cleared of all precautions and contraindications and patient signed written consent and gave verbal consent to dry needling Rx today.     Palpation, myotome testing, and physical exam used to determine dry needling treatment sites.     Patient received dry needling to the following areas:   left soleus/gastroc, left posterior tib/FHL    Patient tolerated the treatment well and demonstrated decreased pressure sensitivity in the lumbar paravertebral muscles and left calf following treatment. Slight decreased in symptoms with gait and static standing.         PATIENT EDUCATION AND HOME EXERCISES     Home Exercises Provided and Patient Education Provided     Education provided:   - Presentation, prognosis, plan of care, HEP  - Dry needling indications, contraindications    Written Home Exercises Provided: yes. Exercises were reviewed and Mayra was able to demonstrate them prior to the end of the session.  Mayra demonstrated good  understanding of the education provided. See EMR under Patient Instructions for exercises provided during therapy sessions    ASSESSMENT     Patient continues to tolerate functional dry needling well with decreased pressure sensitivity to left calf and foot. She remains with relative weakness in FHL and posterior tib, likely contributing to overuse/strain of plantar fascia " and heel. Patient would continue to benefit from skilled PT to address lumbosacral nerve dysfunction which is likely contributing to her left>right lower extremity symptoms.    Mayra Is progressing well towards her goals.   Pt prognosis is Good.     Pt will continue to benefit from skilled outpatient physical therapy to address the deficits listed in the problem list box on initial evaluation, provide pt/family education and to maximize pt's level of independence in the home and community environment.     Pt's spiritual, cultural and educational needs considered and pt agreeable to plan of care and goals.     Anticipated barriers to physical therapy: None  Goals:   Short Term Goals: 4 weeks   Patient will be independent with HEP for symptom management  Patient will increase range of motion >5 deg in BLE in all affected planes to improve functional mobility  Patient will increase strength of BLE by at least 1/2 grade via MMT testing to allow for improved performance of ADLs and daily functional tasks  Patient will be able to walk >20 min  Patient will return to swimming with <2-3/10 pain     Long Term Goals: 12 weeks   Patient will be independent with progressive HEP for continued strengthening to support return to previous level of function  Patient will have grossly symmetrical, pain-free range of motion for improved functional mobility  Patient will increase strength of BLE by at least 1 grade via MMT testing to allow for improved performance of ADLs and daily functional tasks  Patient will be able to stand >60 min  Patient will be able to walk >45 min  Patient will achieve <20% limitation as measured by the FOTO to demonstrate decreased functional disability    PLAN     Continue dry needling as indicated. Lumbar stability - cat/cow, bird dog, dead bug. Posterior tibialis, Soleus strengthening.    Luis Black, PT

## 2022-05-20 ENCOUNTER — CLINICAL SUPPORT (OUTPATIENT)
Dept: REHABILITATION | Facility: HOSPITAL | Age: 57
End: 2022-05-20
Attending: PODIATRIST
Payer: COMMERCIAL

## 2022-05-20 DIAGNOSIS — M72.2 PLANTAR FASCIITIS OF RIGHT FOOT: Primary | ICD-10-CM

## 2022-05-20 DIAGNOSIS — M72.2 PLANTAR FASCIITIS OF LEFT FOOT: ICD-10-CM

## 2022-05-20 PROCEDURE — 97140 MANUAL THERAPY 1/> REGIONS: CPT | Mod: PO

## 2022-05-20 PROCEDURE — 97110 THERAPEUTIC EXERCISES: CPT | Mod: PO

## 2022-05-20 NOTE — PROGRESS NOTES
[facility]   Physical Therapy Treatment Note     Name: Mayra Rushal  [facility] Number: [id number]    Therapy Diagnosis:   Encounter Diagnoses   Name Primary?    Plantar fasciitis of right foot Yes    Plantar fasciitis of left foot      Physician: Rosalio Campuzano DPM    Visit Date: 5/20/2022     Physician Orders: PT Eval and Treat   Medical Diagnosis from Referral:   M72.2 (ICD-10-CM) - Plantar fasciitis of right foot   M72.2 (ICD-10-CM) - Plantar fasciitis of left foot         Evaluation Date: 4/22/2022  Authorization Period Expiration: 12/31/2022  Plan of Care Expiration: 07/22/2022  Progress Note Due: 6th visit or 05/22/2022  Visit # / Visits authorized: 5/ 20   FOTO: Completed on 04/22/2022      Precautions: Standard    Time In: 1000  Time Out: 1100  Total Billable Time: 60 minutes      SUBJECTIVE     Pt reports:Had significant decrease in foot symptoms yesterday morning and today. Feeling better after massage therapist as well. Started Meloxicam. Was able to go barefoot for a couple minutes.  Now having baseline pain, but less than before.    She was compliant with home exercise program.  Response to previous treatment: temporary decrease in symptoms  Functional change: Too soon to assess    Pain: 4/10  Location: left feet      OBJECTIVE     Objective Measures updated at progress report unless specified.     tender to palpation medial calcaneous, plantar fascia  Weak and fatiguable Left FHL, Post tib  TREATMENT     Total Treatment time (time-based codes) separate from Evaluation: 60 minutes     Mayra received the treatments listed below:    Mayra received therapeutic exercises to develop strength, ROM, flexibility and core stabilization for 15 minutes including:  Posterior pelvic tilts 2x20  Articulating bridge x15  Supine sciatic/tibial nerve glide 3x1 min bilaterally   Heel raises 2x15  - cue with tennis ball  Toe yoga provoked heel symptoms so held  Soleus stretch  "bilaterally 2x30 sec      Cues to perform all movements and "stretches" with moderate intensity and to avoid pushing into >2-3/10 pain.     Mayra received the following manual therapy techniques: Joint mobilizations, Myofacial release and Soft tissue Mobilization were applied to the: bilateral lower extremity for 45 minutes, including:  Grade III/IV TC, tarsal, calcaneal, MT glides, 1st MTP to increase DF/INV/EV, mid-foot mobility  Soft tissue mobilization, cross friction massage gastroc/soelus, achilles  Functional Dry Needling:  Discussed the purpose, mechanism, and indications for functional dry needling with Mayra . Patient was cleared of all precautions and contraindications and patient signed written consent and gave verbal consent to dry needling Rx today.     Palpation, myotome testing, and physical exam used to determine dry needling treatment sites.     Patient received dry needling to the following areas:  L5,S2 multifidi, left soleus/gastroc, left posterior tib/FHL    Patient tolerated the treatment well and demonstrated decreased pressure sensitivity in the lumbar paravertebral muscles and left calf following treatment. Slight decreased in symptoms with gait and static standing.         PATIENT EDUCATION AND HOME EXERCISES     Home Exercises Provided and Patient Education Provided     Education provided:   - Presentation, prognosis, plan of care, HEP  - Dry needling indications, contraindications    Written Home Exercises Provided: yes. Exercises were reviewed and Mayra was able to demonstrate them prior to the end of the session.  Mayra demonstrated good  understanding of the education provided. See EMR under Patient Instructions for exercises provided during therapy sessions    ASSESSMENT     Patient decreased symptoms with dry needling, but provoked with performance of toe yoga.  She remains with relative weakness in FHL and posterior tib, likely contributing to overuse/strain of plantar " fascia and heel. Patient would continue to benefit from skilled PT to address lumbosacral nerve dysfunction which is likely contributing to her left>right lower extremity symptoms.    Mayra Is progressing well towards her goals.   Pt prognosis is Good.     Pt will continue to benefit from skilled outpatient physical therapy to address the deficits listed in the problem list box on initial evaluation, provide pt/family education and to maximize pt's level of independence in the home and community environment.     Pt's spiritual, cultural and educational needs considered and pt agreeable to plan of care and goals.     Anticipated barriers to physical therapy: None  Goals:   Short Term Goals: 4 weeks   Patient will be independent with HEP for symptom management  Patient will increase range of motion >5 deg in BLE in all affected planes to improve functional mobility  Patient will increase strength of BLE by at least 1/2 grade via MMT testing to allow for improved performance of ADLs and daily functional tasks  Patient will be able to walk >20 min  Patient will return to swimming with <2-3/10 pain     Long Term Goals: 12 weeks   Patient will be independent with progressive HEP for continued strengthening to support return to previous level of function  Patient will have grossly symmetrical, pain-free range of motion for improved functional mobility  Patient will increase strength of BLE by at least 1 grade via MMT testing to allow for improved performance of ADLs and daily functional tasks  Patient will be able to stand >60 min  Patient will be able to walk >45 min  Patient will achieve <20% limitation as measured by the FOTO to demonstrate decreased functional disability    PLAN     Continue dry needling as indicated. Lumbar stability - cat/cow, bird dog, dead bug. Posterior tibialis, Soleus strengthening.    Luis Black, PT

## 2022-05-23 ENCOUNTER — CLINICAL SUPPORT (OUTPATIENT)
Dept: REHABILITATION | Facility: HOSPITAL | Age: 57
End: 2022-05-23
Attending: PODIATRIST
Payer: COMMERCIAL

## 2022-05-23 DIAGNOSIS — M72.2 PLANTAR FASCIITIS OF RIGHT FOOT: Primary | ICD-10-CM

## 2022-05-23 DIAGNOSIS — M72.2 PLANTAR FASCIITIS OF LEFT FOOT: ICD-10-CM

## 2022-05-23 PROCEDURE — 97110 THERAPEUTIC EXERCISES: CPT | Mod: PO

## 2022-05-23 PROCEDURE — 97140 MANUAL THERAPY 1/> REGIONS: CPT | Mod: PO

## 2022-05-23 NOTE — PROGRESS NOTES
[facility]   Physical Therapy Treatment Note     [id number]    Therapy Diagnosis:   Encounter Diagnoses   Name Primary?    Plantar fasciitis of right foot Yes    Plantar fasciitis of left foot      Physician: Rosalio Campuzano DPM    Visit Date: 5/23/2022     Physician Orders: PT Eval and Treat   Medical Diagnosis from Referral:   M72.2 (ICD-10-CM) - Plantar fasciitis of right foot   M72.2 (ICD-10-CM) - Plantar fasciitis of left foot         Evaluation Date: 4/22/2022  Authorization Period Expiration: 12/31/2022  Plan of Care Expiration: 07/22/2022  Progress Note Due: 6th visit or 05/22/2022  Visit # / Visits authorized: 5/ 20   FOTO: Completed on 04/22/2022      Precautions: Standard    Time In: 1000  Time Out: 1100  Total Billable Time: 60 minutes      SUBJECTIVE     Pt reports: Had a lot of soreness in her calf after therapy and took about 6 hours to fully calm down. Feeling about the same as Friday. Taking Mobic once at night with some relief. Sleep is improved, not waking due to pain.    She was compliant with home exercise program.  Response to previous treatment: temporary decrease in symptoms  Functional change: Too soon to assess    Pain: 4/10  Location: left feet      OBJECTIVE     Objective Measures updated at progress report unless specified.   Antalgic gait with decreased stance time/toe-off left>right.     Functional Screen:      SFMA FN: functional, nonpainful. FP: functional, painful. DP: dysfunctional, painful. DN: dysfunctional, nonpainful.   AROM cervical flexion Dysfunctional, non-painful   AROM cervical extension Dysfunctional, non-painful   AROM cervical rotation R: Functional, non-painful  L: Functional, non-painful   AROM Shoulder ER R: Functional, non-painful  L: Functional, non-painful   AROM Shoulder IR R: Functional, non-painful  L: Functional, non-painful   multi-segmental flexion  Dysfunctional, non-painful    multi-segmental extension Dysfunctional, non-painful   multi-segmental rotation  R: Dysfunctional, non-painful  L: Dysfunctional, non-painful   SLS R: Dysfunctional, non-painful  L: Dysfunctional, less painful   Arms Down Deep Squat Dysfunctional, non-painful         Myotome/Motor Control Testing:  Myotome Peripheral Right Left   L2 (Hip Flexion) Segmental L1, L2 negative positive      L3 (Hip IR) Obturator negative    negative      L3 (Knee Extension) Femoral negative positive   L4 (Ankle DF) Deep Fibular  negative    positive      L5 (FHL) Deep Fibular negative positive   L5 (Hip ABduction) Superior Gluteal  negative    positive      S1 (Knee Flexion) Tibial negative    positive      S2 (Hip Extension) Inferior Gluteal negative    positive            Hip, knee range of motion WNL, symmetrical        Ankle Range of Motion PROM (deg):     Right Left   Dorsi Flexion 10, firm     7, firm painful   1st MTP Extension 85 75, firm      Lower Extremity Strength  (R) LE   (L) LE     Hip flexion: 4+/5 Hip flexion: 4+/5      Hip extension: 4+/5 Hip extension: 4/5   Hip Abduction: 4+/5    Hip abduction: 4/5      Hip ER 4/5    Hip ER 4/5      Hip IR 4+/5    Hip IR 4/5      Knee flexion: 4+/5    Knee flexion: 4+/5      Knee extension: 5/5    Knee extension: 5/5      Ankle DF: 5/5    Ankle DF: 4+/5      Ankle PF: 4/5    Ankle PF: 4/5      Ankle INV: 4+/5    Ankle INV: 4/5      Ankle EV: 5/5    Ankle EV: 5/5      FHL: 4+/5    FHL: 4/5      Posterior Tib: 4+/5    Posterior Tib: 4/5         Special Tests:  SLR (-)   Slump/Nerve tension (+) left       Muscle Length:    Right Left   Quads (Ely test):  120 deg 120 deg   Hamstrings (90/90): (20) deg (30) deg   Hip flexors (Jaycob test): (+) psoas, (-) rectus femoris (+) psoas, (-) rectus femoris      Joint Mobility:  Lumbar: HYPOmobile and Focal pain  L4-S1  Ankle/Foot: HYPOmobile TC, navicular, cuboid, canceal left>right     Palpation: TTP left>right FHL, posterior tib tendons,  "soleus, plantarfascia, mid heel/fat pad. Non-tender achilles, peroneals.     Sensation: Intact to light touch over L3-S1 dermatomes     Limitation/Restriction for FOTO Foot Survey     Therapist reviewed FOTO scores for Mayra Smart on 4/22/2022.   FOTO documents entered into [employer] - see Media section.     Limitation Score: 56%          TREATMENT     Total Treatment time (time-based codes) separate from Evaluation: 60 minutes     Mayra received the treatments listed below:    Mayra received therapeutic exercises to develop strength, ROM, flexibility and core stabilization for 15 minutes including:  Posterior pelvic tilts 2x20  Articulating bridge x15  Supine sciatic/tibial nerve glide 3x1 min bilaterally   Heel raises 2x15  - cue with tennis ball  Toe yoga provoked heel symptoms so held  Soleus stretch bilaterally 2x30 sec      Cues to perform all movements and "stretches" with moderate intensity and to avoid pushing into >2-3/10 pain.     Mayra received the following manual therapy techniques: Joint mobilizations, Myofacial release and Soft tissue Mobilization were applied to the: bilateral lower extremity for 45 minutes, including:  Grade III/IV TC, tarsal, calcaneal, MT glides, 1st MTP to increase DF/INV/EV, mid-foot mobility  Soft tissue mobilization, cross friction massage gastroc/soelus, achilles  Functional Dry Needling:  Discussed the purpose, mechanism, and indications for functional dry needling with Mayra . Patient was cleared of all precautions and contraindications and patient signed written consent and gave verbal consent to dry needling Rx today.     Palpation, myotome testing, and physical exam used to determine dry needling treatment sites.     Patient received dry needling to the following areas:  L4,L5,S1 multifidi, left soleus/gastroc, left posterior tib/FHL    Patient tolerated the treatment well and demonstrated decreased pressure sensitivity in the lumbar paravertebral muscles " and left calf following treatment. Slight decreased in symptoms with gait and static standing.         PATIENT EDUCATION AND HOME EXERCISES     Home Exercises Provided and Patient Education Provided     Education provided:   - Presentation, prognosis, plan of care, HEP  - Dry needling indications, contraindications    Written Home Exercises Provided: yes. Exercises were reviewed and Mayra was able to demonstrate them prior to the end of the session.  Mayra demonstrated good  understanding of the education provided. See EMR under Patient Instructions for exercises provided during therapy sessions    ASSESSMENT     Patient decreased symptoms with dry needling and signiicant improvement in myotome testing of left-sided lumbar nerves. Her symptoms continue to suggest lumbar involvement resulting in hypersensitivity and strain/overuse of static ankle/foot stabilizers. Patient would continue to benefit from skilled PT to address lumbosacral nerve dysfunction which is likely contributing to her left>right lower extremity symptoms.    Mayra Is progressing well towards her goals.   Pt prognosis is Good.     Pt will continue to benefit from skilled outpatient physical therapy to address the deficits listed in the problem list box on initial evaluation, provide pt/family education and to maximize pt's level of independence in the home and community environment.     Pt's spiritual, cultural and educational needs considered and pt agreeable to plan of care and goals.     Anticipated barriers to physical therapy: None  Goals:   Short Term Goals: 4 weeks   Patient will be independent with HEP for symptom management  Patient will increase range of motion >5 deg in BLE in all affected planes to improve functional mobility  Patient will increase strength of BLE by at least 1/2 grade via MMT testing to allow for improved performance of ADLs and daily functional tasks  Patient will be able to walk >20 min  Patient will return  to swimming with <2-3/10 pain     Long Term Goals: 12 weeks   Patient will be independent with progressive HEP for continued strengthening to support return to previous level of function  Patient will have grossly symmetrical, pain-free range of motion for improved functional mobility  Patient will increase strength of BLE by at least 1 grade via MMT testing to allow for improved performance of ADLs and daily functional tasks  Patient will be able to stand >60 min  Patient will be able to walk >45 min  Patient will achieve <20% limitation as measured by the FOTO to demonstrate decreased functional disability    PLAN     Continue dry needling as indicated. Lumbar stability - cat/cow, bird dog, dead bug. Posterior tibialis, Soleus strengthening.    Luis Black, PT

## 2022-05-25 ENCOUNTER — CLINICAL SUPPORT (OUTPATIENT)
Dept: REHABILITATION | Facility: HOSPITAL | Age: 57
End: 2022-05-25
Attending: PODIATRIST
Payer: COMMERCIAL

## 2022-05-25 DIAGNOSIS — M72.2 PLANTAR FASCIITIS OF LEFT FOOT: ICD-10-CM

## 2022-05-25 DIAGNOSIS — M72.2 PLANTAR FASCIITIS OF RIGHT FOOT: Primary | ICD-10-CM

## 2022-05-25 PROCEDURE — 97110 THERAPEUTIC EXERCISES: CPT | Mod: PO

## 2022-05-25 PROCEDURE — 97140 MANUAL THERAPY 1/> REGIONS: CPT | Mod: PO

## 2022-05-25 NOTE — PROGRESS NOTES
[facility]   Physical Therapy Treatment Note     [id number]    Therapy Diagnosis:   Encounter Diagnoses   Name Primary?    Plantar fasciitis of right foot Yes    Plantar fasciitis of left foot      Physician: Rosalio Campuzano DPM    Visit Date: 5/25/2022     Physician Orders: PT Eval and Treat   Medical Diagnosis from Referral:   M72.2 (ICD-10-CM) - Plantar fasciitis of right foot   M72.2 (ICD-10-CM) - Plantar fasciitis of left foot         Evaluation Date: 4/22/2022  Authorization Period Expiration: 12/31/2022  Plan of Care Expiration: 07/22/2022  Progress Note Due: 6th visit or 05/22/2022  Visit # / Visits authorized: 8/ 20   FOTO: Completed on 05/19/2022     Precautions: Standard    Time In: 0900  Time Out: 1000  Total Billable Time: 60 minutes      SUBJECTIVE     Pt reports: She is having more soreness in the heel and outside of her foot after last session. Overall intensity in the heel is less, but feeling like the overall discomfort has not changed much.   She was compliant with home exercise program.  Response to previous treatment: temporary decrease in symptoms  Functional change: Too soon to assess    Pain: 4/10  Location: left feet      OBJECTIVE     Objective Measures updated at progress report unless specified.   Antalgic gait with decreased stance time/toe-off left>right.     Improved myotome testing in lumbar. Limited endurance of FHL and posterior tib.      TREATMENT     Total Treatment time (time-based codes) separate from Evaluation: 60 minutes     Mayra received the treatments listed below:    Mayra received therapeutic exercises to develop strength, ROM, flexibility and core stabilization for 15 minutes including:  Posterior pelvic tilts 2x20  Articulating bridge 2x15  Supine sciatic/tibial nerve glide 3x1 min bilaterally   Heel raises 2x15  - cue with tennis ball  Toe yoga 2x1 min  Soleus stretch bilaterally 2x30 sec  "     Cues to perform all movements and "stretches" with moderate intensity and to avoid pushing into >2-3/10 pain.     Mayra received the following manual therapy techniques: Joint mobilizations, Myofacial release and Soft tissue Mobilization were applied to the: bilateral lower extremity for 45 minutes, including:  Grade III/IV TC, tarsal, calcaneal, MT glides, 1st MTP to increase DF/INV/EV, mid-foot mobility  Soft tissue mobilization, cross friction massage gastroc/soelus, achilles  Functional Dry Needling:  Discussed the purpose, mechanism, and indications for functional dry needling with Mayra . Patient was cleared of all precautions and contraindications and patient signed written consent and gave verbal consent to dry needling Rx today.     Palpation, myotome testing, and physical exam used to determine dry needling treatment sites.     Patient received dry needling to the following areas:   left soleus/gastroc, left posterior tib/FHL    Patient tolerated the treatment well and demonstrated decreased pressure sensitivity in the lumbar paravertebral muscles and left calf following treatment. Slight decreased in symptoms with gait and static standing.         PATIENT EDUCATION AND HOME EXERCISES     Home Exercises Provided and Patient Education Provided     Education provided:   - Presentation, prognosis, plan of care, HEP  - Dry needling indications, contraindications    Written Home Exercises Provided: yes. Exercises were reviewed and Mayra was able to demonstrate them prior to the end of the session.  Mayra demonstrated good  understanding of the education provided. See EMR under Patient Instructions for exercises provided during therapy sessions    ASSESSMENT     Patient demonstrated improved myotome testing for LLE and good response from dry needling to reproduce some of her heel symptoms with decrease during. Tolerated FHL and posterior tib challenge with 37coins board in sitting with good fatigue " and no provocation of heel pain. Her symptoms continue to suggest lumbar involvement resulting in hypersensitivity and strain/overuse of static ankle/foot stabilizers. Patient would continue to benefit from skilled PT to address lumbosacral nerve dysfunction which is likely contributing to her left>right lower extremity symptoms.    Mayra Is progressing well towards her goals.   Pt prognosis is Good.     Pt will continue to benefit from skilled outpatient physical therapy to address the deficits listed in the problem list box on initial evaluation, provide pt/family education and to maximize pt's level of independence in the home and community environment.     Pt's spiritual, cultural and educational needs considered and pt agreeable to plan of care and goals.     Anticipated barriers to physical therapy: None  Goals:   Short Term Goals: 4 weeks   Patient will be independent with HEP for symptom management  Patient will increase range of motion >5 deg in BLE in all affected planes to improve functional mobility  Patient will increase strength of BLE by at least 1/2 grade via MMT testing to allow for improved performance of ADLs and daily functional tasks  Patient will be able to walk >20 min  Patient will return to swimming with <2-3/10 pain     Long Term Goals: 12 weeks   Patient will be independent with progressive HEP for continued strengthening to support return to previous level of function  Patient will have grossly symmetrical, pain-free range of motion for improved functional mobility  Patient will increase strength of BLE by at least 1 grade via MMT testing to allow for improved performance of ADLs and daily functional tasks  Patient will be able to stand >60 min  Patient will be able to walk >45 min  Patient will achieve <20% limitation as measured by the FOTO to demonstrate decreased functional disability    PLAN     Continue dry needling as indicated. Lumbar stability - cat/cow, bird dog, dead bug.  Posterior tibialis, Soleus strengthening.    Luis Black, PT

## 2022-05-30 ENCOUNTER — PATIENT MESSAGE (OUTPATIENT)
Dept: ENDOCRINOLOGY | Facility: CLINIC | Age: 57
End: 2022-05-30
Payer: COMMERCIAL

## 2022-05-30 ENCOUNTER — CLINICAL SUPPORT (OUTPATIENT)
Dept: REHABILITATION | Facility: HOSPITAL | Age: 57
End: 2022-05-30
Attending: PODIATRIST
Payer: COMMERCIAL

## 2022-05-30 DIAGNOSIS — M72.2 PLANTAR FASCIITIS OF RIGHT FOOT: Primary | ICD-10-CM

## 2022-05-30 DIAGNOSIS — M72.2 PLANTAR FASCIITIS OF LEFT FOOT: ICD-10-CM

## 2022-05-30 PROCEDURE — 97110 THERAPEUTIC EXERCISES: CPT | Mod: PO

## 2022-05-30 PROCEDURE — 97140 MANUAL THERAPY 1/> REGIONS: CPT | Mod: PO

## 2022-05-30 NOTE — TELEPHONE ENCOUNTER
I don't think her thyroid is contributing based on her sx's.     Based on recent lab, should not increase dose of NP thyroid.    Can switch to Synthroid/cytomel combo if this is her preference. If her sx's were simiilar when taking Synthroid/Cytomel combo, this suggest that something else might be contributing to her sx's. Her 8 am cortisol was previously wnls. Can do ACTH stim test as a definitive test for AI but I have a low clinical suspicion of this.

## 2022-05-30 NOTE — PROGRESS NOTES
[facility]   Physical Therapy Treatment Note     [id number]    Therapy Diagnosis:   Encounter Diagnoses   Name Primary?    Plantar fasciitis of right foot Yes    Plantar fasciitis of left foot      Physician: Rosalio Campuzano DPM    Visit Date: 5/30/2022     Physician Orders: PT Eval and Treat   Medical Diagnosis from Referral:   M72.2 (ICD-10-CM) - Plantar fasciitis of right foot   M72.2 (ICD-10-CM) - Plantar fasciitis of left foot         Evaluation Date: 4/22/2022  Authorization Period Expiration: 12/31/2022  Plan of Care Expiration: 07/22/2022  Progress Note Due: 6th visit or 05/19/2022  Visit # / Visits authorized: 9/ 20   FOTO: Completed on 05/19/2022     Precautions: Standard    Time In: 1000  Time Out: 1100  Total Billable Time: 60 minutes      SUBJECTIVE     Pt reports: Her pain is located more toward the medial arch and wraps around into the medial ankle. She is disappointed she has not made much overall progress. She has a trip to [place] in July and wants to be able to hike.  She was compliant with home exercise program.  Response to previous treatment: temporary decrease in symptoms  Functional change: Too soon to assess    Pain: 4/10  Location: left medial arch and tarsal tunnel    OBJECTIVE     Objective Measures updated at progress report unless specified.   Antalgic gait with decreased stance time/toe-off left>right.    Limited endurance of FHL and posterior tib.      TREATMENT     Total Treatment time (time-based codes) separate from Evaluation: 60 minutes     Mayra received the treatments listed below:    Mayra received therapeutic exercises to develop strength, ROM, flexibility and core stabilization for 25 minutes including:  Supine sciatic/tibial nerve glide 3x1 min bilaterally   Great toe flexion with green Theraband 2x20  Ankle supination with green Theraband 2x20  Heel raises 2x15 on 1/2 foam roller  - knees  "straight  - knees bent  Toe yoga 2x1 min on Airex foam  Standing ankle AROM on MOBO board 2x1 min (odd pegs and even pegs)  Soleus stretch bilaterally 2x30 sec      Cues to perform all movements and "stretches" with moderate intensity and to avoid pushing into >2-3/10 pain.     Mayra received the following manual therapy techniques: Joint mobilizations, Myofacial release and Soft tissue Mobilization were applied to the: bilateral lower extremity for 25 minutes, including:  Grade III/IV TC, tarsal, calcaneal, MT glides, 1st MTP to increase DF/INV/EV, mid-foot mobility  Soft tissue mobilization, cross friction massage gastroc/soelus, achilles  Functional Dry Needling:  Discussed the purpose, mechanism, and indications for functional dry needling with Mayra . Patient was cleared of all precautions and contraindications and patient signed written consent and gave verbal consent to dry needling Rx today.     Palpation, myotome testing, and physical exam used to determine dry needling treatment sites.     Patient received dry needling to the following areas:   left soleus/gastroc, left posterior tib, left FHL    Patient tolerated the treatment well and demonstrated decreased pressure sensitivity in the lumbar paravertebral muscles and left calf following treatment. Slight decreased in symptoms with gait and static standing.         PATIENT EDUCATION AND HOME EXERCISES     Home Exercises Provided and Patient Education Provided     Education provided:   - Presentation, prognosis, plan of care, HEP  - Dry needling indications, contraindications    Written Home Exercises Provided: yes. Exercises were reviewed and Mayra was able to demonstrate them prior to the end of the session.  Mayra demonstrated good  understanding of the education provided. See EMR under Patient Instructions for exercises provided during therapy sessions    ASSESSMENT     Patient increased medial arch stability with tolerated FHL and " posterior tib challenge with MOBO board in standing with good fatigue and no provocation of heel pain. Her symptoms continue to suggest lumbar involvement resulting in hypersensitivity and strain/overuse of static ankle/foot stabilizers. Patient would continue to benefit from skilled PT to address lumbosacral nerve dysfunction which is likely contributing to her left>right lower extremity symptoms.    Mayra Is progressing well towards her goals.   Pt prognosis is Good.     Pt will continue to benefit from skilled outpatient physical therapy to address the deficits listed in the problem list box on initial evaluation, provide pt/family education and to maximize pt's level of independence in the home and community environment.     Pt's spiritual, cultural and educational needs considered and pt agreeable to plan of care and goals.     Anticipated barriers to physical therapy: None  Goals:   Short Term Goals: 4 weeks   Patient will be independent with HEP for symptom management  Patient will increase range of motion >5 deg in BLE in all affected planes to improve functional mobility  Patient will increase strength of BLE by at least 1/2 grade via MMT testing to allow for improved performance of ADLs and daily functional tasks  Patient will be able to walk >20 min  Patient will return to swimming with <2-3/10 pain     Long Term Goals: 12 weeks   Patient will be independent with progressive HEP for continued strengthening to support return to previous level of function  Patient will have grossly symmetrical, pain-free range of motion for improved functional mobility  Patient will increase strength of BLE by at least 1 grade via MMT testing to allow for improved performance of ADLs and daily functional tasks  Patient will be able to stand >60 min  Patient will be able to walk >45 min  Patient will achieve <20% limitation as measured by the FOTO to demonstrate decreased functional disability    PLAN     Continue dry  needling as indicated. Lumbar stability - cat/cow, bird dog, dead bug. Posterior tibialis, FHL, Soleus strengthening.    Luis Black, PT

## 2022-06-01 ENCOUNTER — HOSPITAL ENCOUNTER (OUTPATIENT)
Dept: RADIOLOGY | Facility: HOSPITAL | Age: 57
Discharge: HOME | End: 2022-06-01
Attending: OBSTETRICS & GYNECOLOGY
Payer: COMMERCIAL

## 2022-06-01 DIAGNOSIS — Z12.31 ENCOUNTER FOR SCREENING MAMMOGRAM FOR MALIGNANT NEOPLASM OF BREAST: ICD-10-CM

## 2022-06-01 PROCEDURE — 77067 SCR MAMMO BI INCL CAD: CPT | Mod: TC,PO

## 2022-06-01 PROCEDURE — 77063 BREAST TOMOSYNTHESIS BI: CPT | Mod: 26,,, | Performed by: RADIOLOGY

## 2022-06-01 PROCEDURE — 77067 SCR MAMMO BI INCL CAD: CPT | Mod: 26,,, | Performed by: RADIOLOGY

## 2022-06-01 PROCEDURE — 77063 MAMMO DIGITAL SCREENING BILAT WITH TOMO: ICD-10-PCS | Mod: 26,,, | Performed by: RADIOLOGY

## 2022-06-01 PROCEDURE — 77067 MAMMO DIGITAL SCREENING BILAT WITH TOMO: ICD-10-PCS | Mod: 26,,, | Performed by: RADIOLOGY

## 2022-06-02 ENCOUNTER — CLINICAL SUPPORT (OUTPATIENT)
Dept: REHABILITATION | Facility: HOSPITAL | Age: 57
End: 2022-06-02
Attending: PODIATRIST
Payer: COMMERCIAL

## 2022-06-02 DIAGNOSIS — M72.2 PLANTAR FASCIITIS OF LEFT FOOT: ICD-10-CM

## 2022-06-02 DIAGNOSIS — M72.2 PLANTAR FASCIITIS OF RIGHT FOOT: Primary | ICD-10-CM

## 2022-06-02 PROCEDURE — 97140 MANUAL THERAPY 1/> REGIONS: CPT | Mod: PO

## 2022-06-06 ENCOUNTER — CLINICAL SUPPORT (OUTPATIENT)
Dept: REHABILITATION | Facility: HOSPITAL | Age: 57
End: 2022-06-06
Attending: PODIATRIST
Payer: COMMERCIAL

## 2022-06-06 DIAGNOSIS — M72.2 PLANTAR FASCIITIS OF LEFT FOOT: ICD-10-CM

## 2022-06-06 DIAGNOSIS — M72.2 PLANTAR FASCIITIS OF RIGHT FOOT: Primary | ICD-10-CM

## 2022-06-06 PROCEDURE — 97112 NEUROMUSCULAR REEDUCATION: CPT | Mod: PO

## 2022-06-06 PROCEDURE — 97140 MANUAL THERAPY 1/> REGIONS: CPT | Mod: PO

## 2022-06-06 PROCEDURE — 97110 THERAPEUTIC EXERCISES: CPT | Mod: PO

## 2022-06-06 NOTE — PROGRESS NOTES
[facility]   Physical Therapy Treatment Note     Name: Mayra Rushal  [facility] Number: [id number]    Therapy Diagnosis:   Encounter Diagnoses   Name Primary?    Plantar fasciitis of right foot Yes    Plantar fasciitis of left foot      Physician: Rosalio Campuzano DPM    Visit Date: 6/2/2022     Physician Orders: PT Eval and Treat   Medical Diagnosis from Referral:   M72.2 (ICD-10-CM) - Plantar fasciitis of right foot   M72.2 (ICD-10-CM) - Plantar fasciitis of left foot         Evaluation Date: 4/22/2022  Authorization Period Expiration: 12/31/2022  Plan of Care Expiration: 07/22/2022  Progress Note Due: 6th visit or 05/19/2022  Visit # / Visits authorized: 9/ 20   FOTO: Completed on 05/19/2022     Precautions: Standard    Time In: 1000  Time Out: 1045  Total Billable Time: 37 minutes      SUBJECTIVE     Pt reports: her pain continues to be about the same with pain in the medial tarsal tunnel area and the proximal plantar fascia area. She did have about a day or so of relief of pain after lower leg dry needling but then pain returned.   .  She was compliant with home exercise program.  Response to previous treatment: temporary decrease in symptoms  Functional change: Too soon to assess    Pain: 4/10  Location: left medial arch and tarsal tunnel    OBJECTIVE     Small decrease in ankle inversion and FHL strength compared to right      TREATMENT     Total Treatment time (time-based codes) separate from Evaluation: 37 minutes     Mayra received the treatments listed below:    Mayra received therapeutic exercises to develop strength, ROM, flexibility and core stabilization for 2  minutes including:  Toe flexion x 30 sec  Ankle pumps x 30 sec  Ankle ROM x 30 sec        Mayra received the following manual therapy techniques: Joint mobilizations, Myofacial release and Soft tissue Mobilization were applied to the: bilateral lower extremity for 35 minutes, including:  Grade III/IV  TC, tarsal, calcaneal, MT glides, 1st MTP to increase DF/INV/EV, mid-foot mobility  Soft tissue mobilization, plantar fascia, medial lower leg    Functional Dry Needling:  Discussed the purpose, mechanism, and indications for functional dry needling with Mayra . Patient was cleared of all precautions and contraindications and patient signed written consent and gave verbal consent to dry needling Rx today.     Palpation, myotome testing, and physical exam used to determine dry needling treatment sites.     Patient received dry needling to the following areas:   left posterior tib, left FHL, plantar fascia           PATIENT EDUCATION AND HOME EXERCISES     Home Exercises Provided and Patient Education Provided     Education provided:   - Presentation, prognosis, plan of care, HEP  - may be a little sore    Written Home Exercises Provided: yes. Exercises were reviewed and Mayra was able to demonstrate them prior to the end of the session.  Mayra demonstrated good  understanding of the education provided. See EMR under Patient Instructions for exercises provided during therapy sessions    ASSESSMENT     Patient had some increased soreness/pain with treatment to plantar aspect of foot with some decrease in pain post treatment. We will see how she does between treatment and continue with HEP and hopefully get overall improvement with pain. We also discussed possible contribution of L4-5 nerve root and may circuit on next visit if needed based on response to treatment today.    Mayra Is progressing well towards her goals.   Pt prognosis is Good.     Pt will continue to benefit from skilled outpatient physical therapy to address the deficits listed in the problem list box on initial evaluation, provide pt/family education and to maximize pt's level of independence in the home and community environment.     Pt's spiritual, cultural and educational needs considered and pt agreeable to plan of care and goals.      Anticipated barriers to physical therapy: None  Goals:   Short Term Goals: 4 weeks   Patient will be independent with HEP for symptom management  Patient will increase range of motion >5 deg in BLE in all affected planes to improve functional mobility  Patient will increase strength of BLE by at least 1/2 grade via MMT testing to allow for improved performance of ADLs and daily functional tasks  Patient will be able to walk >20 min  Patient will return to swimming with <2-3/10 pain     Long Term Goals: 12 weeks   Patient will be independent with progressive HEP for continued strengthening to support return to previous level of function  Patient will have grossly symmetrical, pain-free range of motion for improved functional mobility  Patient will increase strength of BLE by at least 1 grade via MMT testing to allow for improved performance of ADLs and daily functional tasks  Patient will be able to stand >60 min  Patient will be able to walk >45 min  Patient will achieve <20% limitation as measured by the FOTO to demonstrate decreased functional disability    PLAN     Continue dry needling as indicated. Lumbar stability - cat/cow, bird dog, dead bug. Posterior tibialis, FHL, Soleus strengthening.    Ignacio Batres, PT

## 2022-06-06 NOTE — PROGRESS NOTES
[facility]   Physical Therapy Treatment Note     [id number]    Therapy Diagnosis:   Encounter Diagnoses   Name Primary?    Plantar fasciitis of right foot Yes    Plantar fasciitis of left foot      Physician: Rosalio Campuzano DPM    Visit Date: 6/6/2022     Physician Orders: PT Eval and Treat   Medical Diagnosis from Referral:   M72.2 (ICD-10-CM) - Plantar fasciitis of right foot   M72.2 (ICD-10-CM) - Plantar fasciitis of left foot         Evaluation Date: 4/22/2022  Authorization Period Expiration: 12/31/2022  Plan of Care Expiration: 07/22/2022  Progress Note Due: 6th visit or 05/19/2022  Visit # / Visits authorized: 9/ 20   FOTO: Completed on 05/19/2022     Precautions: Standard    Time In: 1000  Time Out: 1055  Total Billable Time: 55 minutes      SUBJECTIVE     Pt reports: Felt some good relief after last session. Feeling no pain in heel, just medial arch and posterior heel. .  She was compliant with home exercise program.  Response to previous treatment: temporary decrease in symptoms  Functional change: Too soon to assess    Pain: 4/10  Location: left medial arch and tarsal tunnel    OBJECTIVE     Small decrease in ankle inversion and FHL strength compared to right      TREATMENT     Total Treatment time (time-based codes) separate from Evaluation: 37 minutes     Mayra received the treatments listed below:    Mayra received therapeutic exercises to develop strength, ROM, flexibility and core stabilization for 15 minutes including:  Toe flexion x 30 sec  Ankle pumps x 30 sec  Ankle ROM x 30 sec  Heel raises on Airex foam 2x10  Bent knee heel raises on Airex foam 2x10    Mayra participated in Neuro re-ed for 10 minutes:  Semi-tandem balance   Single leg balance on Airex foam 2x1 min      Mayra received the following manual therapy techniques: Joint mobilizations, Myofacial release and Soft tissue Mobilization were applied to  the: bilateral lower extremity for 35 minutes, including:  Grade III/IV TC, tarsal, calcaneal, MT glides, 1st MTP to increase DF/INV/EV, mid-foot mobility  Soft tissue mobilization, plantar fascia, medial lower leg    Functional Dry Needling:  Discussed the purpose, mechanism, and indications for functional dry needling with Mayra . Patient was cleared of all precautions and contraindications and patient signed written consent and gave verbal consent to dry needling Rx today.     Palpation, myotome testing, and physical exam used to determine dry needling treatment sites.     Patient received dry needling to the following areas:   left posterior tib, left FHL, plantar fascia (medial calcaneus, 2nd & 4th MT)           PATIENT EDUCATION AND HOME EXERCISES     Home Exercises Provided and Patient Education Provided     Education provided:   - Presentation, prognosis, plan of care, HEP  - may be a little sore    Written Home Exercises Provided: yes. Exercises were reviewed and Mayra was able to demonstrate them prior to the end of the session.  Mayra demonstrated good  understanding of the education provided. See EMR under Patient Instructions for exercises provided during therapy sessions    ASSESSMENT     Patient had some increased soreness/pain with treatment to plantar aspect of foot with some decrease in pain post treatment. Tolerated increased standing balance with no increase in symptoms. We also discussed possible contribution of L4-5 nerve root and may circuit on next visit if needed based on response to treatment today.    Mayra Is progressing well towards her goals.   Pt prognosis is Good.     Pt will continue to benefit from skilled outpatient physical therapy to address the deficits listed in the problem list box on initial evaluation, provide pt/family education and to maximize pt's level of independence in the home and community environment.     Pt's spiritual, cultural and educational needs  considered and pt agreeable to plan of care and goals.     Anticipated barriers to physical therapy: None  Goals:   Short Term Goals: 4 weeks   Patient will be independent with HEP for symptom management  Patient will increase range of motion >5 deg in BLE in all affected planes to improve functional mobility  Patient will increase strength of BLE by at least 1/2 grade via MMT testing to allow for improved performance of ADLs and daily functional tasks  Patient will be able to walk >20 min  Patient will return to swimming with <2-3/10 pain     Long Term Goals: 12 weeks   Patient will be independent with progressive HEP for continued strengthening to support return to previous level of function  Patient will have grossly symmetrical, pain-free range of motion for improved functional mobility  Patient will increase strength of BLE by at least 1 grade via MMT testing to allow for improved performance of ADLs and daily functional tasks  Patient will be able to stand >60 min  Patient will be able to walk >45 min  Patient will achieve <20% limitation as measured by the FOTO to demonstrate decreased functional disability    PLAN     Continue dry needling as indicated. Lumbar stability - cat/cow, bird dog, dead bug. Posterior tibialis, FHL, Soleus strengthening.    Luis Black, PT

## 2022-06-09 ENCOUNTER — CLINICAL SUPPORT (OUTPATIENT)
Dept: REHABILITATION | Facility: HOSPITAL | Age: 57
End: 2022-06-09
Attending: PODIATRIST
Payer: COMMERCIAL

## 2022-06-09 DIAGNOSIS — M72.2 PLANTAR FASCIITIS OF LEFT FOOT: ICD-10-CM

## 2022-06-09 DIAGNOSIS — M72.2 PLANTAR FASCIITIS OF RIGHT FOOT: Primary | ICD-10-CM

## 2022-06-09 PROCEDURE — 97110 THERAPEUTIC EXERCISES: CPT | Mod: PO

## 2022-06-09 PROCEDURE — 97140 MANUAL THERAPY 1/> REGIONS: CPT | Mod: PO

## 2022-06-09 NOTE — PROGRESS NOTES
[facility]   Physical Therapy Treatment Note     Name: Mayra Rushal  [facility] Number: [id number]    Therapy Diagnosis:   Encounter Diagnoses   Name Primary?    Plantar fasciitis of right foot Yes    Plantar fasciitis of left foot      Physician: Rosalio Campuzano DPM    Visit Date: 6/9/2022     Physician Orders: PT Eval and Treat   Medical Diagnosis from Referral:   M72.2 (ICD-10-CM) - Plantar fasciitis of right foot   M72.2 (ICD-10-CM) - Plantar fasciitis of left foot         Evaluation Date: 4/22/2022  Authorization Period Expiration: 12/31/2022  Plan of Care Expiration: 07/22/2022  Progress Note Due: 6th visit or 05/19/2022  Visit # / Visits authorized: 12/ 20   FOTO: Completed on 05/19/2022     Precautions: Standard    Time In: 1104  Time Out: 1200  Total Billable Time: 56 minutes      SUBJECTIVE     Pt reports: Felt less relief after last session. Most of her pain is along the medial arch into the medial part of her ankle and posterior heel.  She was compliant with home exercise program.  Response to previous treatment: temporary decrease in symptoms  Functional change: Too soon to assess    Pain: 4/10  Location: left medial arch and tarsal tunnel    OBJECTIVE     Small decrease in ankle inversion and FHL strength compared to right      TREATMENT     Total Treatment time (time-based codes) separate from Evaluation: 37 minutes     Mayra received the treatments listed below:    Mayra received therapeutic exercises to develop strength, ROM, flexibility and core stabilization for 15 minutes including:  Toe flexion x 30 sec  Ankle pumps x 30 sec  Ankle ROM x 30 sec  Heel raises on Airex foam 2x10  Bent knee heel raises on Airex foam 2x10    Mayra participated in Neuro re-ed for 10 minutes:  Semi-tandem balance   Single leg balance on Airex foam 2x1 min      Mayra received the following manual therapy techniques: Joint mobilizations, Myofacial release and Soft tissue  Mobilization were applied to the: bilateral lower extremity for 35 minutes, including:  Grade III/IV TC, tarsal, calcaneal, MT glides, 1st MTP to increase DF/INV/EV, mid-foot mobility  Soft tissue mobilization, plantar fascia, medial lower leg      **FDN performed by Edgardo Batres PT**  Functional Dry Needling:  Discussed the purpose, mechanism, and indications for functional dry needling with Mayra . Patient was cleared of all precautions and contraindications and patient signed written consent and gave verbal consent to dry needling Rx today.     Palpation, myotome testing, and physical exam used to determine dry needling treatment sites.     Patient received dry needling to the following areas:   left posterior tib, left FHL, plantar fascia (medial calcaneus, 2nd & 4th MT)           PATIENT EDUCATION AND HOME EXERCISES     Home Exercises Provided and Patient Education Provided     Education provided:   - Presentation, prognosis, plan of care, HEP  - may be a little sore    Written Home Exercises Provided: yes. Exercises were reviewed and Mayra was able to demonstrate them prior to the end of the session.  Mayra demonstrated good  understanding of the education provided. See EMR under Patient Instructions for exercises provided during therapy sessions    ASSESSMENT     Patient tolerated dry needling to address tissue sensitivity and motor control impairments in her left foot. She tolerated standing balance with no increase in symptoms. She would benefit from increased left lower extremity stability to allow for increased mobility and load tolerance.    Mayra Is progressing well towards her goals.   Pt prognosis is Good.     Pt will continue to benefit from skilled outpatient physical therapy to address the deficits listed in the problem list box on initial evaluation, provide pt/family education and to maximize pt's level of independence in the home and community environment.     Pt's spiritual,  cultural and educational needs considered and pt agreeable to plan of care and goals.     Anticipated barriers to physical therapy: None  Goals:   Short Term Goals: 4 weeks   Patient will be independent with HEP for symptom management  Patient will increase range of motion >5 deg in BLE in all affected planes to improve functional mobility  Patient will increase strength of BLE by at least 1/2 grade via MMT testing to allow for improved performance of ADLs and daily functional tasks  Patient will be able to walk >20 min  Patient will return to swimming with <2-3/10 pain     Long Term Goals: 12 weeks   Patient will be independent with progressive HEP for continued strengthening to support return to previous level of function  Patient will have grossly symmetrical, pain-free range of motion for improved functional mobility  Patient will increase strength of BLE by at least 1 grade via MMT testing to allow for improved performance of ADLs and daily functional tasks  Patient will be able to stand >60 min  Patient will be able to walk >45 min  Patient will achieve <20% limitation as measured by the FOTO to demonstrate decreased functional disability    PLAN     Continue dry needling as indicated. Lumbar stability - cat/cow, bird dog, dead bug. Posterior tibialis, FHL, Soleus strengthening.    Luis Black, PT

## 2022-06-13 ENCOUNTER — CLINICAL SUPPORT (OUTPATIENT)
Dept: REHABILITATION | Facility: HOSPITAL | Age: 57
End: 2022-06-13
Attending: PODIATRIST
Payer: COMMERCIAL

## 2022-06-13 DIAGNOSIS — M72.2 PLANTAR FASCIITIS OF LEFT FOOT: ICD-10-CM

## 2022-06-13 DIAGNOSIS — M72.2 PLANTAR FASCIITIS OF RIGHT FOOT: Primary | ICD-10-CM

## 2022-06-13 PROCEDURE — 97140 MANUAL THERAPY 1/> REGIONS: CPT | Mod: PO

## 2022-06-13 PROCEDURE — 97110 THERAPEUTIC EXERCISES: CPT | Mod: PO

## 2022-06-13 NOTE — PROGRESS NOTES
[facility]   Physical Therapy Treatment Note     [id number]    Therapy Diagnosis:   Encounter Diagnoses   Name Primary?    Plantar fasciitis of right foot Yes    Plantar fasciitis of left foot      Physician: Rosalio Campuzano DPM    Visit Date: 6/13/2022     Physician Orders: PT Eval and Treat   Medical Diagnosis from Referral:   M72.2 (ICD-10-CM) - Plantar fasciitis of right foot   M72.2 (ICD-10-CM) - Plantar fasciitis of left foot         Evaluation Date: 4/22/2022  Authorization Period Expiration: 12/31/2022  Plan of Care Expiration: 07/22/2022  Progress Note Due: 06/23/2022  Visit # / Visits authorized: 13/ 20   FOTO: Completed on 05/19/2022     Precautions: Standard    Time In: 1200  Time Out: 1300  Total Billable Time: 60 minutes      SUBJECTIVE     Pt reports: Less relief from last dry needling compared to the first time. Not taking Mobic everyday, but it is helpful. Not taking Gabapentin at all anymore.  She was compliant with home exercise program.  Response to previous treatment: temporary decrease in symptoms  Functional change: Too soon to assess    Pain: 4/10  Location: left medial arch and tarsal tunnel    OBJECTIVE     tender to palpation tarsal tunnel, medial plantar fascia, calcaneal fat pad      TREATMENT     Total Treatment time (time-based codes) separate from Evaluation: 60 minutes     Mayra received the treatments listed below:    Mayra received therapeutic exercises to develop strength, ROM, flexibility and core stabilization for 20 minutes including:  Toe flexion x 30 sec  Ankle pumps x 30 sec  Ankle ROM x 30 sec  Heel raises on Airex foam 2x10  Bent knee heel raises on Airex foam 2x10    Mayra participated in Neuro re-ed for 10 minutes:  Semi-tandem balance on airex foam 2x1 min  Semi-tandem heel raises 2x1 min bilaterally   Single leg balance on Airex foam 2x1 min      Mayra received the following manual  therapy techniques: Joint mobilizations, Myofacial release and Soft tissue Mobilization were applied to the: bilateral lower extremity for 35 minutes, including:  Grade III/IV TC, tarsal, calcaneal, MT glides, 1st MTP to increase DF/INV/EV, mid-foot mobility  Soft tissue mobilization, plantar fascia, medial lower leg    Functional Dry Needling:  Discussed the purpose, mechanism, and indications for functional dry needling with Mayra . Patient was cleared of all precautions and contraindications and patient signed written consent and gave verbal consent to dry needling Rx today.     Palpation, myotome testing, and physical exam used to determine dry needling treatment sites.     Patient received dry needling to the following areas:   left posterior tib, left FHL near medial malleolus, plantar fascia (medial calcaneus, cuneiforms), posterior tib/abductor near navicular       Untimed: 10 minutes ice to lower leg and ankle    PATIENT EDUCATION AND HOME EXERCISES     Home Exercises Provided and Patient Education Provided     Education provided:   - Presentation, prognosis, plan of care, HEP  - may be a little sore    Written Home Exercises Provided: yes. Exercises were reviewed and Mayra was able to demonstrate them prior to the end of the session.  Mayra demonstrated good  understanding of the education provided. See EMR under Patient Instructions for exercises provided during therapy sessions    ASSESSMENT     Patient tolerated dry needling to address tissue sensitivity and motor control impairments in her left foot. She tolerated standing balance with no increase in symptoms. She would benefit from increased left lower extremity stability to allow for increased mobility and load tolerance.    Mayra Is progressing well towards her goals.   Pt prognosis is Good.     Pt will continue to benefit from skilled outpatient physical therapy to address the deficits listed in the problem list box on initial  evaluation, provide pt/family education and to maximize pt's level of independence in the home and community environment.     Pt's spiritual, cultural and educational needs considered and pt agreeable to plan of care and goals.     Anticipated barriers to physical therapy: None  Goals:   Short Term Goals: 4 weeks   Patient will be independent with HEP for symptom management  Patient will increase range of motion >5 deg in BLE in all affected planes to improve functional mobility  Patient will increase strength of BLE by at least 1/2 grade via MMT testing to allow for improved performance of ADLs and daily functional tasks  Patient will be able to walk >20 min  Patient will return to swimming with <2-3/10 pain     Long Term Goals: 12 weeks   Patient will be independent with progressive HEP for continued strengthening to support return to previous level of function  Patient will have grossly symmetrical, pain-free range of motion for improved functional mobility  Patient will increase strength of BLE by at least 1 grade via MMT testing to allow for improved performance of ADLs and daily functional tasks  Patient will be able to stand >60 min  Patient will be able to walk >45 min  Patient will achieve <20% limitation as measured by the FOTO to demonstrate decreased functional disability    PLAN     Continue dry needling as indicated. Lumbar stability - cat/cow, bird dog, dead bug. Posterior tibialis, FHL, Soleus strengthening.    Luis Black, PT

## 2022-06-20 ENCOUNTER — PATIENT MESSAGE (OUTPATIENT)
Dept: PODIATRY | Facility: CLINIC | Age: 57
End: 2022-06-20
Payer: COMMERCIAL

## 2022-06-20 RX ORDER — MELOXICAM 15 MG/1
15 TABLET ORAL DAILY
Qty: 90 TABLET | Refills: 0 | Status: SHIPPED | OUTPATIENT
Start: 2022-06-20 | End: 2022-08-04

## 2022-07-01 ENCOUNTER — PROCEDURE VISIT (OUTPATIENT)
Dept: NEUROLOGY | Facility: CLINIC | Age: 57
End: 2022-07-01
Payer: COMMERCIAL

## 2022-07-01 VITALS
BODY MASS INDEX: 25.1 KG/M2 | SYSTOLIC BLOOD PRESSURE: 136 MMHG | RESPIRATION RATE: 17 BRPM | HEART RATE: 79 BPM | DIASTOLIC BLOOD PRESSURE: 89 MMHG | WEIGHT: 147 LBS | HEIGHT: 64 IN

## 2022-07-01 DIAGNOSIS — G43.719 INTRACTABLE CHRONIC MIGRAINE WITHOUT AURA AND WITHOUT STATUS MIGRAINOSUS: Primary | ICD-10-CM

## 2022-07-01 PROCEDURE — 64615 PR CHEMODENERVATION OF MUSCLE FOR CHRONIC MIGRAINE: ICD-10-PCS | Mod: S$GLB,,, | Performed by: PSYCHIATRY & NEUROLOGY

## 2022-07-01 PROCEDURE — 64615 CHEMODENERV MUSC MIGRAINE: CPT | Mod: S$GLB,,, | Performed by: PSYCHIATRY & NEUROLOGY

## 2022-07-01 NOTE — PROCEDURES
Procedures  BOTOX PROCEDURE    PROCEDURE PERFORMED: Botulinum toxin injection (39612)    CLINICAL INDICATION: G43.719    A time out was conducted just before the start of the procedure to verify the correct patient and procedure, procedure location, and all relevant critical information.   The patient meets criteria for chronic headaches according to the ICHD-II, the patient has more than 15 headaches a month out of at least 8 are migraines which last for more than 4 hours a day.    The Botox injections have achieved well over 50%  improvement in the patient's symptoms. Migraines have been reduced at least 7 days per month and the number of cumulative hours suffering with headaches has been reduced at least 100 total hours per month. Today she does have a headache indicating that the Botox has worn off. Frequency of treatment is every 3 months unless no response to the treatments, at which time we will discontinue the injections.      DESCRIPTION OF PROCEDURE: After obtaining informed consent and under aseptic technique, a total of 175 units of botulinum toxin type A were injected in the following muscles: Procerus 5 units,  5 units bilaterally, frontalis 20 units, temporalis 20 units bilaterally, occipitalis 15 units, upper cervical paraspinals 10 units bilaterally, masseters 10 units bilaterally and trapezius 15 units bilaterally. The patient was given a total of 175 units in 31 sites.The patient tolerated the procedure well. There were no complications. The patient was given a prescription for repeat treatment in 3 months.     Unavoidable waste 25 units      Hemanth Kowalski M.D  Medical Director, Headache and Facial Pain  [facility]

## 2022-07-07 ENCOUNTER — PATIENT MESSAGE (OUTPATIENT)
Dept: REHABILITATION | Facility: HOSPITAL | Age: 57
End: 2022-07-07
Payer: COMMERCIAL

## 2022-09-12 ENCOUNTER — LAB VISIT (OUTPATIENT)
Dept: LAB | Facility: HOSPITAL | Age: 57
End: 2022-09-12
Payer: COMMERCIAL

## 2022-09-12 DIAGNOSIS — E78.2 MIXED HYPERLIPIDEMIA: ICD-10-CM

## 2022-09-12 DIAGNOSIS — E03.8 ADULT ONSET HYPOTHYROIDISM: ICD-10-CM

## 2022-09-12 DIAGNOSIS — R94.6 NONSPECIFIC ABNORMAL RESULTS OF THYROID FUNCTION STUDY: ICD-10-CM

## 2022-09-12 LAB
CHOLEST SERPL-MCNC: 235 MG/DL (ref 120–199)
CHOLEST/HDLC SERPL: 4.5 {RATIO} (ref 2–5)
HDLC SERPL-MCNC: 52 MG/DL (ref 40–75)
HDLC SERPL: 22.1 % (ref 20–50)
LDLC SERPL CALC-MCNC: 161.8 MG/DL (ref 63–159)
NONHDLC SERPL-MCNC: 183 MG/DL
T3FREE SERPL-MCNC: 3.9 PG/ML (ref 2.3–4.2)
T4 FREE SERPL-MCNC: 0.73 NG/DL (ref 0.71–1.51)
TRIGL SERPL-MCNC: 106 MG/DL (ref 30–150)
TSH SERPL DL<=0.005 MIU/L-ACNC: 0.54 UIU/ML (ref 0.4–4)

## 2022-09-12 PROCEDURE — 36415 COLL VENOUS BLD VENIPUNCTURE: CPT | Mod: PO

## 2022-09-12 PROCEDURE — 84443 ASSAY THYROID STIM HORMONE: CPT

## 2022-09-12 PROCEDURE — 84439 ASSAY OF FREE THYROXINE: CPT

## 2022-09-12 PROCEDURE — 86376 MICROSOMAL ANTIBODY EACH: CPT

## 2022-09-12 PROCEDURE — 84481 FREE ASSAY (FT-3): CPT

## 2022-09-12 PROCEDURE — 80061 LIPID PANEL: CPT

## 2022-09-14 LAB — THYROPEROXIDASE IGG SERPL-ACNC: 625.8 IU/ML

## 2022-09-23 ENCOUNTER — PROCEDURE VISIT (OUTPATIENT)
Dept: NEUROLOGY | Facility: CLINIC | Age: 57
End: 2022-09-23
Payer: COMMERCIAL

## 2022-09-23 VITALS
BODY MASS INDEX: 25.43 KG/M2 | WEIGHT: 148.94 LBS | HEART RATE: 92 BPM | HEIGHT: 64 IN | SYSTOLIC BLOOD PRESSURE: 127 MMHG | DIASTOLIC BLOOD PRESSURE: 89 MMHG

## 2022-09-23 DIAGNOSIS — G43.719 INTRACTABLE CHRONIC MIGRAINE WITHOUT AURA AND WITHOUT STATUS MIGRAINOSUS: Primary | ICD-10-CM

## 2022-09-23 PROCEDURE — 64615 PR CHEMODENERVATION OF MUSCLE FOR CHRONIC MIGRAINE: ICD-10-PCS | Mod: S$GLB,,, | Performed by: PSYCHIATRY & NEUROLOGY

## 2022-09-23 PROCEDURE — 64615 CHEMODENERV MUSC MIGRAINE: CPT | Mod: S$GLB,,, | Performed by: PSYCHIATRY & NEUROLOGY

## 2022-09-23 NOTE — PROCEDURES
ProceduresBOTOX PROCEDURE    PROCEDURE PERFORMED: Botulinum toxin injection (42379)    CLINICAL INDICATION: G43.719    A time out was conducted just before the start of the procedure to verify the correct patient and procedure, procedure location, and all relevant critical information.   The patient meets criteria for chronic headaches according to the ICHD-II, the patient has more than 15 headaches a month out of at least 8 are migraines which last for more than 4 hours a day.    The Botox injections have achieved well over 50%  improvement in the patient's symptoms. Migraines have been reduced at least 7 days per month and the number of cumulative hours suffering with headaches has been reduced at least 100 total hours per month. Today she does have a headache indicating that the Botox has worn off. Frequency of treatment is every 3 months unless no response to the treatments, at which time we will discontinue the injections.      DESCRIPTION OF PROCEDURE: After obtaining informed consent and under aseptic technique, a total of 175 units of botulinum toxin type A were injected in the following muscles: Procerus 5 units,  5 units bilaterally, frontalis 20 units, temporalis 20 units bilaterally, occipitalis 15 units, upper cervical paraspinals 10 units bilaterally, masseters 10 units bilaterally and trapezius 15 units bilaterally. The patient was given a total of 175 units in 31 sites.The patient tolerated the procedure well. There were no complications. The patient was given a prescription for repeat treatment in 3 months.     Unavoidable waste 25 units      Hemanth Kowalski M.D  Medical Director, Headache and Facial Pain  [facility]

## 2022-09-30 ENCOUNTER — OFFICE VISIT (OUTPATIENT)
Dept: DERMATOLOGY | Facility: CLINIC | Age: 57
End: 2022-09-30
Payer: COMMERCIAL

## 2022-09-30 VITALS — BODY MASS INDEX: 25.41 KG/M2 | HEIGHT: 64 IN | WEIGHT: 148.81 LBS | RESPIRATION RATE: 18 BRPM

## 2022-09-30 DIAGNOSIS — L81.4 LENTIGINES: ICD-10-CM

## 2022-09-30 DIAGNOSIS — L57.0 AK (ACTINIC KERATOSIS): ICD-10-CM

## 2022-09-30 DIAGNOSIS — Z12.83 SCREENING FOR SKIN CANCER: ICD-10-CM

## 2022-09-30 DIAGNOSIS — L82.1 SK (SEBORRHEIC KERATOSIS): ICD-10-CM

## 2022-09-30 DIAGNOSIS — D22.9 MULTIPLE BENIGN NEVI: ICD-10-CM

## 2022-09-30 DIAGNOSIS — D18.01 CHERRY ANGIOMA: ICD-10-CM

## 2022-09-30 DIAGNOSIS — D48.5 NEOPLASM OF UNCERTAIN BEHAVIOR OF SKIN: Primary | ICD-10-CM

## 2022-09-30 PROCEDURE — 1159F MED LIST DOCD IN RCRD: CPT | Mod: CPTII,S$GLB,, | Performed by: DERMATOLOGY

## 2022-09-30 PROCEDURE — 88305 TISSUE EXAM BY PATHOLOGIST: CPT | Mod: 26,,, | Performed by: DERMATOLOGY

## 2022-09-30 PROCEDURE — 11102 TANGNTL BX SKIN SINGLE LES: CPT | Mod: 59,S$GLB,, | Performed by: DERMATOLOGY

## 2022-09-30 PROCEDURE — 88342 CHG IMMUNOCYTOCHEMISTRY: ICD-10-PCS | Mod: 26,,, | Performed by: DERMATOLOGY

## 2022-09-30 PROCEDURE — 17000 DESTRUCT PREMALG LESION: CPT | Mod: S$GLB,,, | Performed by: DERMATOLOGY

## 2022-09-30 PROCEDURE — 1159F PR MEDICATION LIST DOCUMENTED IN MEDICAL RECORD: ICD-10-PCS | Mod: CPTII,S$GLB,, | Performed by: DERMATOLOGY

## 2022-09-30 PROCEDURE — 88342 IMHCHEM/IMCYTCHM 1ST ANTB: CPT | Performed by: DERMATOLOGY

## 2022-09-30 PROCEDURE — 99999 PR PBB SHADOW E&M-EST. PATIENT-LVL IV: CPT | Mod: PBBFAC,,, | Performed by: DERMATOLOGY

## 2022-09-30 PROCEDURE — 11102 PR TANGENTIAL BIOPSY, SKIN, SINGLE LESION: ICD-10-PCS | Mod: 59,S$GLB,, | Performed by: DERMATOLOGY

## 2022-09-30 PROCEDURE — 99999 PR PBB SHADOW E&M-EST. PATIENT-LVL IV: ICD-10-PCS | Mod: PBBFAC,,, | Performed by: DERMATOLOGY

## 2022-09-30 PROCEDURE — 17003 DESTRUCTION, PREMALIGNANT LESIONS; SECOND THROUGH 14 LESIONS: ICD-10-PCS | Mod: S$GLB,,, | Performed by: DERMATOLOGY

## 2022-09-30 PROCEDURE — 88305 TISSUE EXAM BY PATHOLOGIST: CPT | Performed by: DERMATOLOGY

## 2022-09-30 PROCEDURE — 17000 PR DESTRUCTION(LASER SURGERY,CRYOSURGERY,CHEMOSURGERY),PREMALIGNANT LESIONS,FIRST LESION: ICD-10-PCS | Mod: S$GLB,,, | Performed by: DERMATOLOGY

## 2022-09-30 PROCEDURE — 88305 TISSUE EXAM BY PATHOLOGIST: ICD-10-PCS | Mod: 26,,, | Performed by: DERMATOLOGY

## 2022-09-30 PROCEDURE — 88342 IMHCHEM/IMCYTCHM 1ST ANTB: CPT | Mod: 26,,, | Performed by: DERMATOLOGY

## 2022-09-30 PROCEDURE — 99213 PR OFFICE/OUTPT VISIT, EST, LEVL III, 20-29 MIN: ICD-10-PCS | Mod: 25,S$GLB,, | Performed by: DERMATOLOGY

## 2022-09-30 PROCEDURE — 3008F PR BODY MASS INDEX (BMI) DOCUMENTED: ICD-10-PCS | Mod: CPTII,S$GLB,, | Performed by: DERMATOLOGY

## 2022-09-30 PROCEDURE — 3008F BODY MASS INDEX DOCD: CPT | Mod: CPTII,S$GLB,, | Performed by: DERMATOLOGY

## 2022-09-30 PROCEDURE — 17003 DESTRUCT PREMALG LES 2-14: CPT | Mod: S$GLB,,, | Performed by: DERMATOLOGY

## 2022-09-30 PROCEDURE — 99213 OFFICE O/P EST LOW 20 MIN: CPT | Mod: 25,S$GLB,, | Performed by: DERMATOLOGY

## 2022-09-30 RX ORDER — TRETINOIN 0.25 MG/G
CREAM TOPICAL NIGHTLY
Qty: 45 G | Refills: 5 | Status: SHIPPED | OUTPATIENT
Start: 2022-09-30 | End: 2022-12-06

## 2022-09-30 NOTE — PROGRESS NOTES
"  Subjective:       Patient ID:  Mayra Smart is a 57 y.o. female who presents for   Chief Complaint   Patient presents with    Skin Check     HPI  Established patient.   Here today for total body skin exam.   No personal hx skin cancer. Parents with hx of unknown skin cancer type.   LV 11/2020 with PRINCESS, biopsied lesion at L thigh, see below.   C/o lesion at forehead and freckles on arms, chest.    No further complaints today.     Recent path:  1.  Skin, left thigh, shave biopsy:   - MELANOCYTIC NEVUS, COMPOUND TYPE, TRAUMATIZED.     Past Medical History:   Diagnosis Date    Back pain     Bell's palsy     Breast disorder     fibercystic tissue    Fibroids     uterine    GERD (gastroesophageal reflux disease)     Headache     History of anesthesia reaction     sensitive to anesthesia, " Major hangover"    Neck pain     Pelvic pain in female     left lower quadrant    Preeclampsia      Review of Systems   Constitutional:  Negative for fever, chills and fatigue.   Respiratory:  Negative for cough and shortness of breath.    Skin:  Positive for daily sunscreen use and activity-related sunscreen use. Negative for itching, rash, dry skin and wears hat.   Hematologic/Lymphatic: Does not bruise/bleed easily.      Objective:    Physical Exam   Constitutional: She appears well-developed and well-nourished. No distress.   Neurological: She is alert and oriented to person, place, and time. She is not disoriented.   Psychiatric: She has a normal mood and affect.   Skin:   Areas Examined (abnormalities noted in diagram):   Scalp / Hair Palpated and Inspected  Head / Face Inspection Performed  Neck Inspection Performed  Chest / Axilla Inspection Performed  Abdomen Inspection Performed  Genitals / Buttocks / Groin Inspection Performed  Back Inspection Performed  RUE Inspected  LUE Inspection Performed  RLE Inspected  LLE Inspection Performed  Nails and Digits Inspection Performed                 Diagram Legend     " Erythematous scaling macule/papule c/w actinic keratosis       Vascular papule c/w angioma      Pigmented verrucoid papule/plaque c/w seborrheic keratosis      Yellow umbilicated papule c/w sebaceous hyperplasia      Irregularly shaped tan macule c/w lentigo     1-2 mm smooth white papules consistent with Milia      Movable subcutaneous cyst with punctum c/w epidermal inclusion cyst      Subcutaneous movable cyst c/w pilar cyst      Firm pink to brown papule c/w dermatofibroma      Pedunculated fleshy papule(s) c/w skin tag(s)      Evenly pigmented macule c/w junctional nevus     Mildly variegated pigmented, slightly irregular-bordered macule c/w mildly atypical nevus      Flesh colored to evenly pigmented papule c/w intradermal nevus       Pink pearly papule/plaque c/w basal cell carcinoma      Erythematous hyperkeratotic cursted plaque c/w SCC      Surgical scar with no sign of skin cancer recurrence      Open and closed comedones      Inflammatory papules and pustules      Verrucoid papule consistent consistent with wart     Erythematous eczematous patches and plaques     Dystrophic onycholytic nail with subungual debris c/w onychomycosis     Umbilicated papule    Erythematous-base heme-crusted tan verrucoid plaque consistent with inflamed seborrheic keratosis     Erythematous Silvery Scaling Plaque c/w Psoriasis     See annotation        Assessment / Plan:      Pathology Orders:       Normal Orders This Visit    Specimen to Pathology, Dermatology     Comments:    Number of Specimens:->1  ------------------------->-------------------------  Spec 1 Procedure:->Biopsy  Spec 1 Clinical Impression:->DF r/o bcc, atypical melanocytic  Spec 1 Source:->L posterior shoulder  Release to patient->Immediate    Questions:    Procedure Type: Dermatology and skin neoplasms    Number of Specimens: 1    ------------------------: -------------------------    Spec 1 Procedure: Biopsy    Spec 1 Clinical Impression: DF r/o bcc,  atypical melanocytic    Spec 1 Source: L posterior shoulder    Release to patient: Immediate          Neoplasm of uncertain behavior of skin  -     Specimen to Pathology, Dermatology  - Discussed diagnosis with patient and explained uncertain nature of condition, including differential DDX.   - Discussed treatment options (biopsy, close monitoring) with patient, including the risks and benefits of each. Patient opted to pursue biopsy.  - Shave Biopsy Procedure Note: Discussed procedure with patient/patient's guardian including risks and benefits as well as treatment alternatives. Risks of procedure include pain, bleeding, infection, post-inflammatory pigmentary alteration, scar, recurrence. Patient informed that the purpose of a biopsy is sampling of condition in question rather than removal in entirety; further treatment may be necessary. Verbal consent obtained. Area to be biopsied marked and cleansed with alcohol. Local anesthesia achieved by injecting approximately 1 cc of 1% lidocaine with epinephrine. One shave biopsy performed using a double edge razor blade; specimen submitted to pathology. Hemostasis achieved with aluminum chloride. Petroleum jelly and bandage applied to wound. Patient tolerated procedure well. After-visit wound care instructions reviewed and provided in writing.     AK (actinic keratosis)  - Discussed diagnosis, etiology, and precancerous nature of condition.   - Cryosurgery Procedure Note: Discussed procedure with patient/patient's guardian including risks and benefits as well as treatment alternatives. Risks of procedure include pain, itching, swelling, redness, blistering, crusting, wound formation, post-inflammatory pigmentary alteration, scar, recurrence. Verbal consent obtained. LN2 cryosurgery performed to 2 lesion(s). Patient tolerated procedure well. After-visit wound care instructions reviewed and provided in writing.     Multiple benign nevi  - Discussed diagnosis, etiology, and  benign-nature of condition.  - Reassured; no lesions suspicious for malignancy noted on exam today.   - Recommended routine self examination of skin. Discussed the ABCDEs of melanoma and ugly duckling sign.   - Recommended daily sun protection, including the use of OTC broad-spectrum sunscreen (SPF 30 or greater) and sun-protective clothing.      Lentigines  -     tretinoin (RETIN-A) 0.025 % cream; Apply topically nightly. Apply pea-sized amount to entire field nightly. Start slow, up-titrate as tolerated.  Dispense: 45 g; Refill: 5  - Benign; reassured treatment not necessary.   - Recommended daily sun protection, including the use of OTC broad-spectrum sunscreen (SPF 30 or greater) and sun-protective clothing.    - Retin A as above.   - Counseled on potential SE of medication(s) and instructed on use.     SK (seborrheic keratosis)  Cherry angioma  - Benign; reassured treatment not necessary.     Screening for skin cancer  - Total body skin examination performed today.  - Findings listed above.   - Recommended routine self examination of skin.    - Recommended daily sun protection, including the use of OTC broad-spectrum sunscreen (SPF 30 or greater) and sun-protective clothing.             Follow up for annual skin checks, sooner pending pathology, sooner PRN.

## 2022-10-03 NOTE — PATIENT INSTRUCTIONS
Shave Biopsy Wound Care    Your doctor has performed a shave biopsy today.  A band aid and vaseline ointment has been placed over the site.  This should remain in place for NO LONGER THAN 48 hours.  It is fine to remove the bandaid after 24 hours, if the area is no longer bleeding. It is recommended that you keep the area dry (do not wet)) for the first 24 hours.  After 24 hours, wash the area with warm soap and water and apply Vaseline jelly.  Many patients prefer to use Neosporin or Bacitracin ointment.  This is acceptable; however, know that you can develop an allergy to this medication even if you have used it safely for years.  It is important to keep the area moist.  Letting it dry out and get air slows healing time, and will worsen the scar.        If you notice increasing redness, tenderness, pain, or yellow drainage at the biopsy site, please notify your doctor.  These are signs of an infection.    If your biopsy site is bleeding, apply firm pressure for 15 minutes straight.  Repeat for another 15 minutes, if it is still bleeding.   If the surgical site continues to bleed, then please contact your doctor.      For [facility] users:   You will receive your biopsy results in [facility] as soon as they are available. Please be assured that your physician/provider will review your results and will then determine what further treatment, evaluation, or planning is required. You should be contacted by your physician's/provider's office within 5 business days of receiving your results; If not, please reach out to directly. This is one more way [facility] you [facility].     [facility] [place] [address]/ [phone] FAX/ [email or web address]     CRYOSURGERY      Your doctor has used a method called cryosurgery to treat your skin condition. Cryosurgery refers to the use of very cold substances to treat a variety of skin conditions such as warts, pre-skin cancers, molluscum contagiosum,  sun spots, and several benign growths. The substance we use in cryosurgery is liquid nitrogen and is so cold (-195 degrees Celsius) that is burns when administered.     Following treatment in the office, the skin may immediately burn and become red. You may find the area around the lesion is affected as well. It is sometimes necessary to treat not only the lesion, but a small area of the surrounding normal skin to achieve a good response.     A blister, and even a blood filled blister, may form after treatment.   This is a normal response. If the blister is painful, it is acceptable to sterilize a needle and with rubbing alcohol and gently pop the blister. It is important that you gently wash the area with soap and warm water as the blister fluid may contain wart virus if a wart was treated. Do no remove the roof of the blister.     The area treated can take anywhere from 1-3 weeks to heal. Healing time depends on the kind skin lesion treated, the location, and how aggressively the lesion was treated. It is recommended that the areas treated are covered with Vaseline or bacitracin ointment and a band-aid. If a band-aid is not practical, just ointment applied several times per day will do. Keeping these areas moist will speed the healing time.    Treatment with liquid nitrogen can leave a scar. In dark skin, it may be a light or dark scar, in light skin it may be a white or pink scar. These will generally fade with time, but may never go away completely.     If you have any concerns after your treatment, please feel free to call the office.       [facility] [place] [address]/ [phone] FAX/ [email or web address]

## 2022-10-06 LAB
FINAL PATHOLOGIC DIAGNOSIS: NORMAL
GROSS: NORMAL
Lab: NORMAL
MICROSCOPIC EXAM: NORMAL

## 2022-10-07 NOTE — PROGRESS NOTES
Skin, left posterior shoulder, shave biopsy:   -FOLLICULAR INDUCTION OF THE EPIDERMIS WITH UNDERLYING DERMAL FIBROSIS, see   comment   Comment:  The biopsy is superificial. The epidermis and superficial dermis   resemble that of a dermatofibroma.  An atypical melanocytic proliferation or   basal cell carcinoma is not identified.     Benign; reassured further treatment not necessary via portal message.

## 2022-12-05 ENCOUNTER — TELEPHONE (OUTPATIENT)
Dept: NEUROLOGY | Facility: CLINIC | Age: 57
End: 2022-12-05
Payer: COMMERCIAL

## 2022-12-05 ENCOUNTER — PATIENT MESSAGE (OUTPATIENT)
Dept: NEUROLOGY | Facility: CLINIC | Age: 57
End: 2022-12-05
Payer: COMMERCIAL

## 2022-12-05 DIAGNOSIS — G43.719 INTRACTABLE CHRONIC MIGRAINE WITHOUT AURA AND WITHOUT STATUS MIGRAINOSUS: ICD-10-CM

## 2022-12-05 RX ORDER — ELETRIPTAN HYDROBROMIDE 40 MG/1
TABLET, FILM COATED ORAL
Qty: 12 TABLET | Refills: 11 | Status: CANCELLED | OUTPATIENT
Start: 2022-12-05

## 2022-12-05 RX ORDER — ELETRIPTAN HYDROBROMIDE 40 MG/1
TABLET, FILM COATED ORAL
Qty: 12 TABLET | Refills: 11 | Status: SHIPPED | OUTPATIENT
Start: 2022-12-05 | End: 2022-12-05 | Stop reason: CLARIF

## 2022-12-05 RX ORDER — FREMANEZUMAB-VFRM 225 MG/1.5ML
225 INJECTION SUBCUTANEOUS
Qty: 1.5 ML | Refills: 11 | Status: SHIPPED | OUTPATIENT
Start: 2022-12-05 | End: 2023-01-04

## 2022-12-05 RX ORDER — FREMANEZUMAB-VFRM 225 MG/1.5ML
225 INJECTION SUBCUTANEOUS
Qty: 1.5 ML | Refills: 11 | Status: CANCELLED | OUTPATIENT
Start: 2022-12-05

## 2022-12-05 RX ORDER — FREMANEZUMAB-VFRM 225 MG/1.5ML
225 INJECTION SUBCUTANEOUS
Qty: 1.5 ML | Refills: 11 | Status: SHIPPED | OUTPATIENT
Start: 2022-12-05 | End: 2022-12-05 | Stop reason: CLARIF

## 2022-12-05 RX ORDER — UBROGEPANT 100 MG/1
100 TABLET ORAL ONCE AS NEEDED
Qty: 16 TABLET | Refills: 11 | Status: SHIPPED | OUTPATIENT
Start: 2022-12-05 | End: 2022-12-05

## 2022-12-05 RX ORDER — ELETRIPTAN HYDROBROMIDE 40 MG/1
TABLET, FILM COATED ORAL
Qty: 12 TABLET | Refills: 11 | Status: SHIPPED | OUTPATIENT
Start: 2022-12-05 | End: 2023-03-08 | Stop reason: SDUPTHER

## 2022-12-05 RX ORDER — ELETRIPTAN HYDROBROMIDE 40 MG/1
TABLET, FILM COATED ORAL
Qty: 12 TABLET | Refills: 11 | Status: SHIPPED | OUTPATIENT
Start: 2022-12-05 | End: 2022-12-05 | Stop reason: SDUPTHER

## 2022-12-05 RX ORDER — FREMANEZUMAB-VFRM 225 MG/1.5ML
225 INJECTION SUBCUTANEOUS
Qty: 1.5 ML | Refills: 11 | Status: SHIPPED | OUTPATIENT
Start: 2022-12-05 | End: 2022-12-05 | Stop reason: SDUPTHER

## 2022-12-05 NOTE — TELEPHONE ENCOUNTER
----- Message from Chanel ARACELI Davisna sent at 12/5/2022  1:28 PM CST -----  Regarding: Advise  Contact: Patient  Type: Needs Medical Advice  Who Called:  Patient  Symptoms (please be specific):  Severe migraine  How long has patient had these symptoms:  since Tuesday    Best Call Back Number: [phone] (home)     Additional Information: Patient states she has not been able to receive her Ajovy and she wants to know if she can get samples of something that would help. Please send fremanezumab-vfrm (AJOVY AUTOINJECTOR) 225 mg/1.5 mL to the [facility]. Please call patient to adivse. Thanks!     [facility] [address] [facility] [address] [place] [address]  Phone: [phone] Fax: [phone]

## 2022-12-06 ENCOUNTER — OFFICE VISIT (OUTPATIENT)
Dept: NEUROLOGY | Facility: CLINIC | Age: 57
End: 2022-12-06
Payer: COMMERCIAL

## 2022-12-06 VITALS
DIASTOLIC BLOOD PRESSURE: 87 MMHG | SYSTOLIC BLOOD PRESSURE: 151 MMHG | HEIGHT: 64 IN | RESPIRATION RATE: 17 BRPM | HEART RATE: 94 BPM | BODY MASS INDEX: 25.99 KG/M2 | WEIGHT: 152.25 LBS

## 2022-12-06 DIAGNOSIS — G43.901 STATUS MIGRAINOSUS: Primary | ICD-10-CM

## 2022-12-06 DIAGNOSIS — R11.0 NAUSEA: ICD-10-CM

## 2022-12-06 DIAGNOSIS — G43.719 INTRACTABLE CHRONIC MIGRAINE WITHOUT AURA AND WITHOUT STATUS MIGRAINOSUS: ICD-10-CM

## 2022-12-06 PROCEDURE — 1159F PR MEDICATION LIST DOCUMENTED IN MEDICAL RECORD: ICD-10-PCS | Mod: CPTII,S$GLB,, | Performed by: NURSE PRACTITIONER

## 2022-12-06 PROCEDURE — 3077F PR MOST RECENT SYSTOLIC BLOOD PRESSURE >= 140 MM HG: ICD-10-PCS | Mod: CPTII,S$GLB,, | Performed by: NURSE PRACTITIONER

## 2022-12-06 PROCEDURE — 1160F PR REVIEW ALL MEDS BY PRESCRIBER/CLIN PHARMACIST DOCUMENTED: ICD-10-PCS | Mod: CPTII,S$GLB,, | Performed by: NURSE PRACTITIONER

## 2022-12-06 PROCEDURE — 96372 THER/PROPH/DIAG INJ SC/IM: CPT | Mod: S$GLB,,, | Performed by: NURSE PRACTITIONER

## 2022-12-06 PROCEDURE — 99999 PR PBB SHADOW E&M-EST. PATIENT-LVL IV: CPT | Mod: PBBFAC,,, | Performed by: NURSE PRACTITIONER

## 2022-12-06 PROCEDURE — 3008F BODY MASS INDEX DOCD: CPT | Mod: CPTII,S$GLB,, | Performed by: NURSE PRACTITIONER

## 2022-12-06 PROCEDURE — 1160F RVW MEDS BY RX/DR IN RCRD: CPT | Mod: CPTII,S$GLB,, | Performed by: NURSE PRACTITIONER

## 2022-12-06 PROCEDURE — 3008F PR BODY MASS INDEX (BMI) DOCUMENTED: ICD-10-PCS | Mod: CPTII,S$GLB,, | Performed by: NURSE PRACTITIONER

## 2022-12-06 PROCEDURE — 99999 PR PBB SHADOW E&M-EST. PATIENT-LVL IV: ICD-10-PCS | Mod: PBBFAC,,, | Performed by: NURSE PRACTITIONER

## 2022-12-06 PROCEDURE — 96372 PR INJECTION,THERAP/PROPH/DIAG2ST, IM OR SUBCUT: ICD-10-PCS | Mod: S$GLB,,, | Performed by: NURSE PRACTITIONER

## 2022-12-06 PROCEDURE — 3077F SYST BP >= 140 MM HG: CPT | Mod: CPTII,S$GLB,, | Performed by: NURSE PRACTITIONER

## 2022-12-06 PROCEDURE — 3079F DIAST BP 80-89 MM HG: CPT | Mod: CPTII,S$GLB,, | Performed by: NURSE PRACTITIONER

## 2022-12-06 PROCEDURE — 99214 PR OFFICE/OUTPT VISIT, EST, LEVL IV, 30-39 MIN: ICD-10-PCS | Mod: 25,S$GLB,, | Performed by: NURSE PRACTITIONER

## 2022-12-06 PROCEDURE — 99214 OFFICE O/P EST MOD 30 MIN: CPT | Mod: 25,S$GLB,, | Performed by: NURSE PRACTITIONER

## 2022-12-06 PROCEDURE — 3079F PR MOST RECENT DIASTOLIC BLOOD PRESSURE 80-89 MM HG: ICD-10-PCS | Mod: CPTII,S$GLB,, | Performed by: NURSE PRACTITIONER

## 2022-12-06 PROCEDURE — 1159F MED LIST DOCD IN RCRD: CPT | Mod: CPTII,S$GLB,, | Performed by: NURSE PRACTITIONER

## 2022-12-06 RX ORDER — ONDANSETRON 2 MG/ML
4 INJECTION INTRAMUSCULAR; INTRAVENOUS
Status: COMPLETED | OUTPATIENT
Start: 2022-12-06 | End: 2022-12-06

## 2022-12-06 RX ORDER — PROMETHAZINE HYDROCHLORIDE 25 MG/1
25 TABLET ORAL EVERY 8 HOURS PRN
Qty: 30 TABLET | Refills: 11 | Status: SHIPPED | OUTPATIENT
Start: 2022-12-06

## 2022-12-06 RX ORDER — METHYLPREDNISOLONE 4 MG/1
TABLET ORAL
Qty: 21 EACH | Refills: 0 | Status: SHIPPED | OUTPATIENT
Start: 2022-12-06 | End: 2022-12-27

## 2022-12-06 RX ORDER — KETOROLAC TROMETHAMINE 30 MG/ML
30 INJECTION, SOLUTION INTRAMUSCULAR; INTRAVENOUS
Status: COMPLETED | OUTPATIENT
Start: 2022-12-06 | End: 2022-12-06

## 2022-12-06 RX ADMIN — ONDANSETRON 4 MG: 2 INJECTION INTRAMUSCULAR; INTRAVENOUS at 10:12

## 2022-12-06 RX ADMIN — KETOROLAC TROMETHAMINE 30 MG: 30 INJECTION, SOLUTION INTRAMUSCULAR; INTRAVENOUS at 10:12

## 2022-12-06 NOTE — PROGRESS NOTES
Subjective:       Patient ID: Mayra Smart is a 53 y.o. female.    Chief Complaint: Headache    INTERVAL HISTORY 12/6/22    Patient presents for follow up. Last office visit was with me a year and a half ago. She is on Botox with Dr. Kowalski and most recent Botox session was 9/23/22.    Current prevention - Botox, Ajovy   Current acute - eletriptan, Ubrelvy    Today she reports she is much worse. She reports her Ajovy was discontinued unexpectedly. She states it was not shipped automatically and she is unsure why. She has headaches in the sides of the head, eyes, between eyebrows. Current pain 8 with range 0-10. She has had a daily headache since last Wednesday. Before that she was having 4 migraines per week. Both Ajovy and Ubrelvy are currently at [facility] pending PA. She states she has taken Zofran last night without much improvement. Otherwise information below is reviewed and verified with no changes made     INTERVAL HISTORY 7/29/21  The patient location is: home  The chief complaint leading to consultation is: follow up  Visit type: audiovisual  Face to Face time with patient: 15  25 minutes of total time spent on the encounter, which includes face to face time and non-face to face time preparing to see the patient (eg, review of tests), Obtaining and/or reviewing separately obtained history, Documenting clinical information in the electronic or other health record, Independently interpreting results (not separately reported) and communicating results to the patient/family/caregiver, or Care coordination (not separately reported).   Each patient to whom he or she provides medical services by telemedicine is:  (1) informed of the relationship between the physician and patient and the respective role of any other health care provider with respect to management of the patient; and (2) notified that he or she may decline to receive medical services by telemedicine and may withdraw from such care at  "any time.    Notes:     Last office visit with Dr. Kowalski was almost two years ago. She has been on Botox since 9/6/19 with most recent session being 7/23/21. She is also on Emgality.     Today she reports she is a little better. She had taken almost 9 months off of Botox due to insurance. She has had two sessions on schedule again. She reports a daily headache with weekly escalations to migraine. She has several migraine abortives including Ubrelvy, Relpax, Zomig, and Imitrex. She feels nothing is working. She previously tried Nurtec and it was not effective. Current migraine cycle ongoing for past 6 days.     INTERVAL HISTORY 8/26/2019  The patient comes for follow up. She was doing very well on Emgality until end of May when it stopped working, now she is having headaches about 20 days per month. Using acute treatmetn almost every day. Waking up with a headache almost daily. Has never had a sleep study. Steroids in June given by her PCP provided no help. Excedrin approximately 20 per month, also taking Relpax and Advil. Relpax was recalled last week (traces of contaminant), Diamox did not help for weather related headaches. Already on Spironolactone. She has a new TMJ splint with undetermined benefit.     HPI   The patient is a pleasant 52 y/o female presenting with chief complaint of headaches. She first developed them around the age of 38 for no apparent reason. She was under the care of a neurologist for a long time. She has tried multiple medications without benefit or poor tolerability as listed below. She has tried different diets, including the "JJ diet" with no help. Hormonal adjustments ineffective as well. Drug holiday for 6 weeks has not helped. Amongst the acute medications, Relpax works the best, she has tried all other triptans, fioricet, fiorinal, and OTC analgesics.  Please see details of headache characteristics below.  Headache questionnaire    1. When did your Headaches start?    15 years " ago      2. Where are your headaches located?   Temporal and behind eyes      3. Your headache's characteristics:   Pressure, Throbbing, Pounding, Stabbing, Like a tight band, Sharp      4. How long does the headache last?   days      5. How often does the headache occur?   daily      6. Are your headaches preceded or accompanied by other symptoms? yes   If yes, please describe.  Sometimes nausea      7. Does the headache awaken you at night? yes   If so, how often? 4-5 times a night        8. Please oz the word that best describes your headache's intensity:    severe      9. Using a scale of 1 through 10, with 0 = no pain and 10 = the worst pain:   What score is your headache now? 4   What score is your headache at its worst? 10   What score is your headache at its best? 0        10. Possible associated headache symptoms:  [x]  Sensitivity to light  [x] Dizziness  [] Nasal or sinus pressure/ pain   [x] Sensitivity to noise  [x] Vertigo  [] Problems with concentration  [x] Sensitivity to smells  [] Ringing in ears  [] Problems with memory    [] Blurred vision  [] Irritability  [] Problems with task completion   [] Double vision  [] Anger  []  Problems with relaxation  [x] Loss of appetite  [] Anxiety  [x] Neck tightness, Neck pain  [x] Nausea   [] Nasal congestion  [x] Vomiting         11. Headache improving factors:  [] Sleep  [] Heat  [] Darkness  [x] Ice  [] Local pressure [] Menses (period)  [] Massage   [x] Medications:        12. Headache worsening factors:   [] Fatigue [] Sneezing  [x] Changes in Weather  [] Light [] Bending Over [] Stress  [] Noise [] Ovulation  [] Multiple Sclerosis Flare-Up  [] Smells  [] Menses  [] Food   [] Coughing [x] Alcohol      13. Number of caffeinated drinks per day: 1      14. Number of diet drinks per day:  0      15. Have you seen any other [facility] within the last 3 years?  No      Please Check any Medications Tried for Headache    AED Neuromodulators  MAOIs   Ergot Alkaloids    Acetazolamide (Diamox) [] Phenelzine (Nardil) [] Dihydroergotamine (Migranal) []   Carbamazepine (Tegretol) [] Tranylcypromine (Parnate) [] Ergotamine (Ergomar) []   Gabapentin (Neurontin) [x] Antihistamine/Serotonergic  Triptans    Lacosamide (Vimpat) [] Cyproheptadine (Periactin) [x] Almotriptan (Axert) [x]   Lamotrigine (Lamictal) [] Antihypertensives  Eletriptan (Relpax) [x]   Levatiracetam (Keppra) [] Atenolol (Tenormin) [] Frovatriptan (Frova) [x]   Oxcarbazepine (Trileptal) [] Bisoprostol (Zebeta) [] Naratriptan (Amerge) []   Phenobarbital [] Candesartan (Atacand) [] Rizatriptan (Maxalt) [x]   Phenytoin (Dilantin) [] Diltiazem (Cardizem) [] Sumatriptan (Imitrex) [x]   Pregabalin (Lyrica) [] Lisinopril (Prinivil, Zestril) [x] Zolmitriptan (Zomig) [x]   Primidone (Mysoline) [] Metoprolol (Toprol) [x] Combo Abortives    Tiagabine (Gabatril) [] Nadolol (Corgard) [x] Butalbital and Acetaminophen (Bupap) []   Topiramate (Topamax)  (Trokendi) [x] Nicardipine (Cardene) []     Vigabatrin (Sabril) [] Nimodipine (Nimotop) [] Butalbital, Acetaminophen, and caffeine (Fioricet) [x]   Valproic Acid (Depakote) (Divalproex Sodium) [] Propranolol (Inderal) [x]     Zonisamide (Zonegran) [] Telmisartan (Micardis) [] Butalbital, Aspirin, and caffeine (Fiorinal) [x]   Benzodiazepines  Timolol (Blocadren) []     Alprazolam (Xanax) [x] Verapamil (Calan, Verelan) [] Butalbital, Caffeine, Acetaminophen, and Codeine (Fioricet with Codeine) []   Diazepam (Valium) [x] NSAIDs      Lorazepam (Ativan) [x] Acetaminophen (Tylenol) [x]     Clonazepam (Klonopin) [] Acetylsalicylic Acid (Aspirin) [x] Butalbital, Caffeine, Aspirin, and Codeine  (Fiorinal with Codeine) []   Antidepressants  Diclofenac (Cambia) []     Amitriptyline (Elavil) [] Ibuprofen (Motrin) [x]     Desipramine (Norpramin) [] Indomethacin (Indocin) [x] Aspirin, Caffeine, and Acetaminophen (Excedrin) (Goodys) []   Doxepin (Sinequan) [] Ketoprofen (Orudis) [x]   "   Fluoxetine (Prozac) [] Ketorolac (Toradol) [x] Acetaminophen, Dichloralphenazone, and Isometheptene (Midrin) []   Imipramine (Tofranil) [] Naproxen (Anaprox) (Aleve) [x]     Nortriptyline (Pamelor) [] Meclofenamic Acid (Meclomen) []     Diamox        Venlafaxine (Effexor) [] Meloxicam (Mobic) [] Aspirin, Salicylamide, and Caffeine (BC Powder) [x]          Review of Systems   Constitutional: Positive for fatigue. Negative for activity change, appetite change and fever.   HENT: Negative for congestion, dental problem, hearing loss, sinus pressure, tinnitus, trouble swallowing and voice change.    Eyes: Negative for photophobia, pain, redness and visual disturbance.   Respiratory: Negative for cough, chest tightness and shortness of breath.    Cardiovascular: Negative for chest pain, palpitations and leg swelling.   Gastrointestinal: Negative for abdominal pain, blood in stool, nausea and vomiting.   Endocrine: Positive for cold intolerance and heat intolerance.   Genitourinary: Negative for difficulty urinating, frequency, menstrual problem and urgency.   Musculoskeletal: Negative for arthralgias, back pain, gait problem, joint swelling, myalgias, neck pain and neck stiffness.   Skin: Negative.    Neurological: Positive for headaches. Negative for dizziness, tremors, seizures, syncope, facial asymmetry, speech difficulty, weakness, light-headedness and numbness.   Hematological: Negative for adenopathy. Does not bruise/bleed easily.   Psychiatric/Behavioral: Negative for agitation, behavioral problems, confusion, decreased concentration, self-injury, sleep disturbance and suicidal ideas. The patient is not nervous/anxious and is not hyperactive.            Past Medical History:   Diagnosis Date    Back pain     Bell's palsy     Breast disorder     fibercystic tissue    Fibroids     uterine    GERD (gastroesophageal reflux disease)     History of anesthesia reaction     sensitive to anesthesia, " Major hangover"    " Neck pain     Pelvic pain in female     left lower quadrant    Preeclampsia      Past Surgical History:   Procedure Laterality Date     SECTION      times 2    CYSTOSCOPY N/A 2012    Performed by Terrance Pereira MD at [facility] OR    DILATION AND CURETTAGE OF UTERUS  2007    ENDOMETRIAL ABLATION  10/22/2008    BTL    HYSTERECTOMY, TOTAL, LAPAROSCOPIC N/A 2012    Performed by Terrance Pereira MD at [facility] OR    SALPINGECTOMY, LAPAROSCOPIC Right 2012    Performed by Terrance Pereira MD at [facility] OR    SALPINGO-OOPHORECTOMY Left 2012    Performed by Terrance Pereira MD at [facility] OR     Family History   Problem Relation Age of Onset    Breast cancer Neg Hx     Ovarian cancer Neg Hx     Pulmonary embolism Mother      Social History     Socioeconomic History    Marital status:      Spouse name: Not on file    Number of children: Not on file    Years of education: Not on file    Highest education level: Not on file   Social Needs    Financial resource strain: Not on file    Food insecurity - worry: Not on file    Food insecurity - inability: Not on file    Transportation needs - medical: Not on file    Transportation needs - non-medical: Not on file   Occupational History    Not on file   Tobacco Use    Smoking status: Never Smoker   Substance and Sexual Activity    Alcohol use: Yes     Comment: ocassionally    Drug use: No    Sexual activity: Yes   Other Topics Concern    Not on file   Social History Narrative    Not on file     Review of patient's allergies indicates:   Allergen Reactions    Cephalosporins Anaphylaxis    Penicillins Anaphylaxis    Codeine Itching and Swelling       Current Outpatient Medications:     eletriptan (RELPAX) 40 MG tablet, Take 40 mg by mouth as needed. may repeat in 2 hours if necessary , Disp: , Rfl:     levothyroxine (TIROSINT) 75 mcg Cap, Take 75 mcg by mouth once daily.  , Disp: , Rfl:     liothyronine (CYTOMEL) 5 MCG Tab, , Disp: , Rfl:     MINIVELLE  0.05 mg/24 hr, , Disp: , Rfl:     progesterone (PROMETRIUM) 100 MG capsule, TK 1 C PO QHS, Disp: , Rfl: 6    spironolactone (ALDACTONE) 50 MG tablet, , Disp: , Rfl:     VENTOLIN HFA 90 mcg/actuation inhaler, , Disp: , Rfl:     DEXLANSOPRAZOLE (DEXILANT ORAL), Take by mouth every morning. , Disp: , Rfl:     eletriptan (RELPAX) 40 MG tablet, may repeat in 2 hours if necessary. Patient has tried and failed all the other triptans, Disp: 9 tablet, Rfl: 5    galcanezumab-gnlm (EMGALITY) 120 mg/mL PnIj, Inject 120 mg into the skin every 28 days., Disp: 1 Syringe, Rfl: 5    ketorolac (SPRIX) 15.75 mg/spray Spry, 15.75 mg by Nasal route every 6 (six) hours., Disp: 5 each, Rfl: 3      Objective:      There were no vitals filed for this visit.  There is no height or weight on file to calculate BMI.    Physical Exam    Constitutional:   She appears well-developed and well-nourished. She is well groomed    HENT:    Head: Atraumatic, oral and nasal mucosa intact  Eyes: Conjunctivae and EOM are normal. Pupils are equal, round, and reactive to light OU  Neck: Neck supple. No thyromegaly present  Cardiovascular: Normal rate and normal heart sounds  No murmur heard  Pulmonary/Chest: Effort normal and breath sounds normal  Musculoskeletal: Normal range of motion. No joint stiffness. No vertebral point tenderness  Skin: Skin is warm and dry  Psychiatric: Normal mood and affect     Neuro exam:    Mental status:  Awake, attentive, Alert, oriented to self, place, year and month  Language function is intact    Cranial Nerves:  Smell was not formally evaluated  Cranial Nerves II - XII: intact  Pursuits were smooth, normal saccades, no nystagmus OU  Funduscopic exam - disc were flat and pink, no exudates or hemorrhages OU  Motor - facial movement was symmetrical and normal     Palate moved well and was symmetrical with normal palatal and oral sensation  Tongue movements were full    Coordination:     Rapid alternating movements and rapid  finger tapping - normal bilaterally  Finger to nose - normal and symmetric bilaterally   Arm roll - smooth and symmetric   No intentional or positional tremor.     Motor:  Normal muscle bulk and symmetry. No fasciculations were noted    No pronator drift  Strength 5/5 bilaterally     Reflexes:  Tendon reflexes were 2 + at biceps, triceps, brachioradialis, patellar, and Achilles bilaterally  No clonus was noted     Sensory: Intact to light touch, pin prick in all extremities.     Gait: Romberg absent. Normal gait. Good tandem.     Review of Data:  Lab Results   Component Value Date     11/13/2012    K 4.7 11/13/2012     11/13/2012    CO2 25 11/13/2012    BUN 10 11/13/2012    CREATININE 0.8 11/13/2012    GLU 96 11/13/2012       Lab Results   Component Value Date    WBC 6.73 11/20/2012    HGB 11.1 (L) 11/20/2012    HCT 32.7 (L) 11/20/2012    MCV 88.1 11/20/2012     11/20/2012       Lab Results   Component Value Date    TSH 0.292 (L) 11/13/2012         Assessment and Plan     The patient has chronic migraines ( G43.719) and suffers from headaches more than 15 days a month lasting more than 4 hours a day with no relief of symptoms despite trying multiple medications as listed above (Gabapentin, Topamax, Toprol, Propranolol, Lisinopril, Emgality). She is s/p two Botox sessions since resuming. Will continue with planned three. If no further improvement, can consider switching from Emgality to Aimovig.    For acute treatment, increase initial Ubrelvy dose to 100 mg to see if better efficacy. Discussed overuse and having multiple triptans can lead to accidental medication overuse headache.  Zomig NAsal spray due to severe nausea and Imitrex injections for more severe attacks.   Add prednisone course to break current cycle.    I have discussed the side effects of the medications prescribed and the patient acknowledges understanding          DAVID Senior

## 2022-12-06 NOTE — PROGRESS NOTES
Subjective:       Patient ID: Mayra Smart is a 53 y.o. female.    Chief Complaint: Headache    INTERVAL HISTORY 12/6/22    Patient presents for follow up. Last office visit was with me a year and a half ago. She is on Botox with Dr. Kowalski and most recent Botox session was 9/23/22.    Current prevention - Botox, Ajovy (missed last two months)  Current acute - eletriptan, Ubrelvy    Today she reports she is much worse. She reports her Ajovy was discontinued unexpectedly. She states it was not shipped automatically and she is unsure why. She has headaches in the sides of the head, eyes, between eyebrows. Current pain 8 with range 0-10. She has had a daily headache since last Wednesday. Before that she was having 4 migraines per week. Both Ajovy and Ubrelvy are currently at [facility] pending PA. She states she has taken Zofran last night without much improvement. Otherwise information below is reviewed and verified with no changes made     INTERVAL HISTORY 7/29/21  The patient location is: home  The chief complaint leading to consultation is: follow up  Visit type: audiovisual  Face to Face time with patient: 15  25 minutes of total time spent on the encounter, which includes face to face time and non-face to face time preparing to see the patient (eg, review of tests), Obtaining and/or reviewing separately obtained history, Documenting clinical information in the electronic or other health record, Independently interpreting results (not separately reported) and communicating results to the patient/family/caregiver, or Care coordination (not separately reported).   Each patient to whom he or she provides medical services by telemedicine is:  (1) informed of the relationship between the physician and patient and the respective role of any other health care provider with respect to management of the patient; and (2) notified that he or she may decline to receive medical services by telemedicine and may  "withdraw from such care at any time.    Notes:     Last office visit with Dr. Kowalski was almost two years ago. She has been on Botox since 9/6/19 with most recent session being 7/23/21. She is also on Emgality.     Today she reports she is a little better. She had taken almost 9 months off of Botox due to insurance. She has had two sessions on schedule again. She reports a daily headache with weekly escalations to migraine. She has several migraine abortives including Ubrelvy, Relpax, Zomig, and Imitrex. She feels nothing is working. She previously tried Nurtec and it was not effective. Current migraine cycle ongoing for past 6 days.     INTERVAL HISTORY 8/26/2019  The patient comes for follow up. She was doing very well on Emgality until end of May when it stopped working, now she is having headaches about 20 days per month. Using acute treatmetn almost every day. Waking up with a headache almost daily. Has never had a sleep study. Steroids in June given by her PCP provided no help. Excedrin approximately 20 per month, also taking Relpax and Advil. Relpax was recalled last week (traces of contaminant), Diamox did not help for weather related headaches. Already on Spironolactone. She has a new TMJ splint with undetermined benefit.     HPI   The patient is a pleasant 54 y/o female presenting with chief complaint of headaches. She first developed them around the age of 38 for no apparent reason. She was under the care of a neurologist for a long time. She has tried multiple medications without benefit or poor tolerability as listed below. She has tried different diets, including the "JJ diet" with no help. Hormonal adjustments ineffective as well. Drug holiday for 6 weeks has not helped. Amongst the acute medications, Relpax works the best, she has tried all other triptans, fioricet, fiorinal, and OTC analgesics.  Please see details of headache characteristics below.  Headache questionnaire    1. When did your " Headaches start?    15 years ago      2. Where are your headaches located?   Temporal and behind eyes      3. Your headache's characteristics:   Pressure, Throbbing, Pounding, Stabbing, Like a tight band, Sharp      4. How long does the headache last?   days      5. How often does the headache occur?   daily      6. Are your headaches preceded or accompanied by other symptoms? yes   If yes, please describe.  Sometimes nausea      7. Does the headache awaken you at night? yes   If so, how often? 4-5 times a night        8. Please oz the word that best describes your headache's intensity:    severe      9. Using a scale of 1 through 10, with 0 = no pain and 10 = the worst pain:   What score is your headache now? 4   What score is your headache at its worst? 10   What score is your headache at its best? 0        10. Possible associated headache symptoms:  [x]  Sensitivity to light  [x] Dizziness  [] Nasal or sinus pressure/ pain   [x] Sensitivity to noise  [x] Vertigo  [] Problems with concentration  [x] Sensitivity to smells  [] Ringing in ears  [] Problems with memory    [] Blurred vision  [] Irritability  [] Problems with task completion   [] Double vision  [] Anger  []  Problems with relaxation  [x] Loss of appetite  [] Anxiety  [x] Neck tightness, Neck pain  [x] Nausea   [] Nasal congestion  [x] Vomiting         11. Headache improving factors:  [] Sleep  [] Heat  [] Darkness  [x] Ice  [] Local pressure [] Menses (period)  [] Massage   [x] Medications:        12. Headache worsening factors:   [] Fatigue [] Sneezing  [x] Changes in Weather  [] Light [] Bending Over [] Stress  [] Noise [] Ovulation  [] Multiple Sclerosis Flare-Up  [] Smells  [] Menses  [] Food   [] Coughing [x] Alcohol      13. Number of caffeinated drinks per day: 1      14. Number of diet drinks per day:  0      15. Have you seen any other [facility] within the last 3 years?  No      Please Check any Medications Tried for  Headache    AED Neuromodulators  MAOIs  Ergot Alkaloids    Acetazolamide (Diamox) [] Phenelzine (Nardil) [] Dihydroergotamine (Migranal) []   Carbamazepine (Tegretol) [] Tranylcypromine (Parnate) [] Ergotamine (Ergomar) []   Gabapentin (Neurontin) [x] Antihistamine/Serotonergic  Triptans    Lacosamide (Vimpat) [] Cyproheptadine (Periactin) [x] Almotriptan (Axert) [x]   Lamotrigine (Lamictal) [] Antihypertensives  Eletriptan (Relpax) [x]   Levatiracetam (Keppra) [] Atenolol (Tenormin) [] Frovatriptan (Frova) [x]   Oxcarbazepine (Trileptal) [] Bisoprostol (Zebeta) [] Naratriptan (Amerge) []   Phenobarbital [] Candesartan (Atacand) [] Rizatriptan (Maxalt) [x]   Phenytoin (Dilantin) [] Diltiazem (Cardizem) [] Sumatriptan (Imitrex) [x]   Pregabalin (Lyrica) [] Lisinopril (Prinivil, Zestril) [x] Zolmitriptan (Zomig) [x]   Primidone (Mysoline) [] Metoprolol (Toprol) [x] Combo Abortives    Tiagabine (Gabatril) [] Nadolol (Corgard) [x] Butalbital and Acetaminophen (Bupap) []   Topiramate (Topamax)  (Trokendi) [x] Nicardipine (Cardene) []     Vigabatrin (Sabril) [] Nimodipine (Nimotop) [] Butalbital, Acetaminophen, and caffeine (Fioricet) [x]   Valproic Acid (Depakote) (Divalproex Sodium) [] Propranolol (Inderal) [x]     Zonisamide (Zonegran) [] Telmisartan (Micardis) [] Butalbital, Aspirin, and caffeine (Fiorinal) [x]   Benzodiazepines  Timolol (Blocadren) []     Alprazolam (Xanax) [x] Verapamil (Calan, Verelan) [] Butalbital, Caffeine, Acetaminophen, and Codeine (Fioricet with Codeine) []   Diazepam (Valium) [x] NSAIDs      Lorazepam (Ativan) [x] Acetaminophen (Tylenol) [x]     Clonazepam (Klonopin) [] Acetylsalicylic Acid (Aspirin) [x] Butalbital, Caffeine, Aspirin, and Codeine  (Fiorinal with Codeine) []   Antidepressants  Diclofenac (Cambia) []     Amitriptyline (Elavil) [] Ibuprofen (Motrin) [x]     Desipramine (Norpramin) [] Indomethacin (Indocin) [x] Aspirin, Caffeine, and Acetaminophen (Excedrin) (Goodys) []    Doxepin (Sinequan) [] Ketoprofen (Orudis) [x]     Fluoxetine (Prozac) [] Ketorolac (Toradol) [x] Acetaminophen, Dichloralphenazone, and Isometheptene (Midrin) []   Imipramine (Tofranil) [] Naproxen (Anaprox) (Aleve) [x]     Nortriptyline (Pamelor) [] Meclofenamic Acid (Meclomen) []     Diamox        Venlafaxine (Effexor) [] Meloxicam (Mobic) [] Aspirin, Salicylamide, and Caffeine (BC Powder) [x]          Review of Systems   Constitutional: Positive for fatigue. Negative for activity change, appetite change and fever.   HENT: Negative for congestion, dental problem, hearing loss, sinus pressure, tinnitus, trouble swallowing and voice change.    Eyes: Negative for photophobia, pain, redness and visual disturbance.   Respiratory: Negative for cough, chest tightness and shortness of breath.    Cardiovascular: Negative for chest pain, palpitations and leg swelling.   Gastrointestinal: Negative for abdominal pain, blood in stool, nausea and vomiting.   Endocrine: Positive for cold intolerance and heat intolerance.   Genitourinary: Negative for difficulty urinating, frequency, menstrual problem and urgency.   Musculoskeletal: Negative for arthralgias, back pain, gait problem, joint swelling, myalgias, neck pain and neck stiffness.   Skin: Negative.    Neurological: Positive for headaches. Negative for dizziness, tremors, seizures, syncope, facial asymmetry, speech difficulty, weakness, light-headedness and numbness.   Hematological: Negative for adenopathy. Does not bruise/bleed easily.   Psychiatric/Behavioral: Negative for agitation, behavioral problems, confusion, decreased concentration, self-injury, sleep disturbance and suicidal ideas. The patient is not nervous/anxious and is not hyperactive.            Past Medical History:   Diagnosis Date    Back pain     Bell's palsy     Breast disorder     fibercystic tissue    Fibroids     uterine    GERD (gastroesophageal reflux disease)     History of anesthesia reaction   "   sensitive to anesthesia, " Major hangover"    Neck pain     Pelvic pain in female     left lower quadrant    Preeclampsia      Past Surgical History:   Procedure Laterality Date     SECTION      times 2    CYSTOSCOPY N/A 2012    Performed by Terrance Pereira MD at [facility]    DILATION AND CURETTAGE OF UTERUS  2007    ENDOMETRIAL ABLATION  10/22/2008    BTL    HYSTERECTOMY, TOTAL, LAPAROSCOPIC N/A 2012    Performed by Terrance Pereira MD at [facility] OR    SALPINGECTOMY, LAPAROSCOPIC Right 2012    Performed by Terrance Pereira MD at [facility]    SALPINGO-OOPHORECTOMY Left 2012    Performed by Terrance Pereira MD at [facility] OR     Family History   Problem Relation Age of Onset    Breast cancer Neg Hx     Ovarian cancer Neg Hx     Pulmonary embolism Mother      Social History     Socioeconomic History    Marital status:      Spouse name: Not on file    Number of children: Not on file    Years of education: Not on file    Highest education level: Not on file   Social Needs    Financial resource strain: Not on file    Food insecurity - worry: Not on file    Food insecurity - inability: Not on file    Transportation needs - medical: Not on file    Transportation needs - non-medical: Not on file   Occupational History    Not on file   Tobacco Use    Smoking status: Never Smoker   Substance and Sexual Activity    Alcohol use: Yes     Comment: ocassionally    Drug use: No    Sexual activity: Yes   Other Topics Concern    Not on file   Social History Narrative    Not on file     Review of patient's allergies indicates:   Allergen Reactions    Cephalosporins Anaphylaxis    Penicillins Anaphylaxis    Codeine Itching and Swelling       Current Outpatient Medications:     eletriptan (RELPAX) 40 MG tablet, Take 40 mg by mouth as needed. may repeat in 2 hours if necessary , Disp: , Rfl:     levothyroxine (TIROSINT) 75 mcg Cap, Take 75 mcg by mouth once daily.  , Disp: , Rfl:     liothyronine " (CYTOMEL) 5 MCG Tab, , Disp: , Rfl:     MINIVELLE 0.05 mg/24 hr, , Disp: , Rfl:     progesterone (PROMETRIUM) 100 MG capsule, TK 1 C PO QHS, Disp: , Rfl: 6    spironolactone (ALDACTONE) 50 MG tablet, , Disp: , Rfl:     VENTOLIN HFA 90 mcg/actuation inhaler, , Disp: , Rfl:     DEXLANSOPRAZOLE (DEXILANT ORAL), Take by mouth every morning. , Disp: , Rfl:     eletriptan (RELPAX) 40 MG tablet, may repeat in 2 hours if necessary. Patient has tried and failed all the other triptans, Disp: 9 tablet, Rfl: 5    galcanezumab-gnlm (EMGALITY) 120 mg/mL PnIj, Inject 120 mg into the skin every 28 days., Disp: 1 Syringe, Rfl: 5    ketorolac (SPRIX) 15.75 mg/spray Spry, 15.75 mg by Nasal route every 6 (six) hours., Disp: 5 each, Rfl: 3      Objective:      Vitals:    12/06/22 1012   BP: (!) 151/87   Pulse: 94   Resp: 17     Body mass index is 26.13 kg/m².    Physical Exam     12/6/22  Constitutional:   She appears well-developed and well-nourished. She is well groomed. Appears in significant pain. Sunglasses on and lights in room dimmed to as low as possible.      Neurological Exam:  General: well-developed, well-nourished, no distress  Mental status: Awake and alert  Speech language: No dysarthria or aphasia on conversation  Cranial nerves: Face symmetric  Motor: Moves all extremities well  Coordination: No ataxia. No tremor.    Review of Data:  Lab Results   Component Value Date     11/13/2012    K 4.7 11/13/2012     11/13/2012    CO2 25 11/13/2012    BUN 10 11/13/2012    CREATININE 0.8 11/13/2012    GLU 96 11/13/2012       Lab Results   Component Value Date    WBC 6.73 11/20/2012    HGB 11.1 (L) 11/20/2012    HCT 32.7 (L) 11/20/2012    MCV 88.1 11/20/2012     11/20/2012       Lab Results   Component Value Date    TSH 0.292 (L) 11/13/2012         Assessment and Plan     The patient has chronic migraines ( G43.719) and suffers from headaches more than 15 days a month lasting more than 4 hours a day with no relief  of symptoms despite trying multiple medications as listed above (Gabapentin, Topamax, Toprol, Propranolol, Lisinopril, Emgality).     Currently doing best on Botox and Ajovy however she has missed two months of Ajovy and now in status migrainous for past 7 days. Will try to break the cycle with Toradol and Zofran IM in clinic and start steroids. Medications are pending PA approval.      For acute treatment, increase initial Ubrelvy dose to 100 mg to see if better efficacy. Discussed overuse and having multiple triptans can lead to accidental medication overuse headache.      I have discussed the side effects of the medications prescribed and the patient acknowledges understanding      Face to Face time with patient: 25  35 minutes of total time spent on the encounter, which includes face to face time and non-face to face time on day of visit preparing to see the patient (eg, review of tests), Obtaining and/or reviewing separately obtained history, Documenting clinical information in the electronic or other health record, Independently interpreting results (not separately reported) and communicating results to the patient/family/caregiver, or Care coordination (not separately reported).       Ella Alvarenga, DAVID

## 2022-12-07 ENCOUNTER — TELEPHONE (OUTPATIENT)
Dept: PHARMACY | Facility: CLINIC | Age: 57
End: 2022-12-07
Payer: COMMERCIAL

## 2022-12-07 NOTE — TELEPHONE ENCOUNTER
DOCUMENTATION ONLY  Relpax   Approval date: 12/5/2022 to 12/5/2023   Case ID# [id number]    Ajovy   Approval date: 12/5/2022 to 12/5/2023  Case ID# [id number]    Ubrelvy   Approval date: 12/5/2022 to 12/5/2023   Case ID# [id number]

## 2022-12-09 ENCOUNTER — PATIENT MESSAGE (OUTPATIENT)
Dept: NEUROLOGY | Facility: CLINIC | Age: 57
End: 2022-12-09
Payer: COMMERCIAL

## 2022-12-15 ENCOUNTER — PROCEDURE VISIT (OUTPATIENT)
Dept: NEUROLOGY | Facility: CLINIC | Age: 57
End: 2022-12-15
Payer: COMMERCIAL

## 2022-12-15 VITALS
WEIGHT: 146.38 LBS | HEART RATE: 84 BPM | HEIGHT: 64 IN | SYSTOLIC BLOOD PRESSURE: 135 MMHG | RESPIRATION RATE: 17 BRPM | BODY MASS INDEX: 24.99 KG/M2 | DIASTOLIC BLOOD PRESSURE: 92 MMHG

## 2022-12-15 DIAGNOSIS — G43.901 STATUS MIGRAINOSUS: Primary | ICD-10-CM

## 2022-12-15 DIAGNOSIS — G43.711 CHRONIC MIGRAINE WITHOUT AURA, WITH INTRACTABLE MIGRAINE, SO STATED, WITH STATUS MIGRAINOSUS: ICD-10-CM

## 2022-12-15 PROCEDURE — 64615 CHEMODENERV MUSC MIGRAINE: CPT | Mod: S$GLB,,, | Performed by: PSYCHIATRY & NEUROLOGY

## 2022-12-15 PROCEDURE — 64615 PR CHEMODENERVATION OF MUSCLE FOR CHRONIC MIGRAINE: ICD-10-PCS | Mod: S$GLB,,, | Performed by: PSYCHIATRY & NEUROLOGY

## 2022-12-15 RX ORDER — PROCHLORPERAZINE EDISYLATE 5 MG/ML
10 INJECTION INTRAMUSCULAR; INTRAVENOUS
Status: CANCELLED | OUTPATIENT
Start: 2022-12-15 | End: 2022-12-15

## 2022-12-15 RX ORDER — KETOROLAC TROMETHAMINE 30 MG/ML
30 INJECTION, SOLUTION INTRAMUSCULAR; INTRAVENOUS
Status: CANCELLED | OUTPATIENT
Start: 2022-12-15 | End: 2022-12-15

## 2022-12-15 RX ORDER — DIHYDROERGOTAMINE MESYLATE 1 MG/ML
0.3 INJECTION, SOLUTION INTRAMUSCULAR; INTRAVENOUS; SUBCUTANEOUS ONCE
Status: CANCELLED
Start: 2022-12-15 | End: 2022-12-15

## 2022-12-15 RX ORDER — HYDROCODONE BITARTRATE AND IBUPROFEN 7.5; 2 MG/1; MG/1
TABLET, FILM COATED ORAL
Qty: 14 TABLET | Refills: 0 | Status: SHIPPED | OUTPATIENT
Start: 2022-12-15 | End: 2023-01-04

## 2022-12-15 RX ORDER — DIPHENHYDRAMINE HYDROCHLORIDE 50 MG/ML
25 INJECTION INTRAMUSCULAR; INTRAVENOUS
Status: CANCELLED | OUTPATIENT
Start: 2022-12-15 | End: 2022-12-15

## 2022-12-15 NOTE — PROCEDURES
ProceduresBOTOX PROCEDURE    PROCEDURE PERFORMED: Botulinum toxin injection (15965)    CLINICAL INDICATION: G43.719    A time out was conducted just before the start of the procedure to verify the correct patient and procedure, procedure location, and all relevant critical information.   The patient meets criteria for chronic headaches according to the ICHD-II, the patient has more than 15 headaches a month out of at least 8 are migraines which last for more than 4 hours a day.    The Botox injections have achieved well over 50%  improvement in the patient's symptoms. Migraines have been reduced at least 7 days per month and the number of cumulative hours suffering with headaches has been reduced at least 100 total hours per month. Today she does have a headache indicating that the Botox has worn off. Frequency of treatment is every 3 months unless no response to the treatments, at which time we will discontinue the injections.      DESCRIPTION OF PROCEDURE: After obtaining informed consent and under aseptic technique, a total of 175 units of botulinum toxin type A were injected in the following muscles: Procerus 5 units,  5 units bilaterally, frontalis 20 units, temporalis 20 units bilaterally, occipitalis 15 units, upper cervical paraspinals 10 units bilaterally, masseters 10 units bilaterally and trapezius 15 units bilaterally. The patient was given a total of 175 units in 31 sites.The patient tolerated the procedure well. There were no complications. The patient was given a prescription for repeat treatment in 3 months.     Unavoidable waste 25 units      Hemanth Kowalski M.D  Medical Director, Headache and Facial Pain  [facility]

## 2022-12-16 ENCOUNTER — PATIENT MESSAGE (OUTPATIENT)
Dept: NEUROLOGY | Facility: CLINIC | Age: 57
End: 2022-12-16
Payer: COMMERCIAL

## 2022-12-19 ENCOUNTER — TELEPHONE (OUTPATIENT)
Dept: INFUSION THERAPY | Facility: HOSPITAL | Age: 57
End: 2022-12-19
Payer: COMMERCIAL

## 2022-12-19 ENCOUNTER — PATIENT MESSAGE (OUTPATIENT)
Dept: NEUROLOGY | Facility: CLINIC | Age: 57
End: 2022-12-19
Payer: COMMERCIAL

## 2022-12-19 ENCOUNTER — INFUSION (OUTPATIENT)
Dept: INFUSION THERAPY | Facility: HOSPITAL | Age: 57
End: 2022-12-19
Attending: PSYCHIATRY & NEUROLOGY
Payer: COMMERCIAL

## 2022-12-19 VITALS
RESPIRATION RATE: 16 BRPM | DIASTOLIC BLOOD PRESSURE: 77 MMHG | BODY MASS INDEX: 25.62 KG/M2 | WEIGHT: 149.25 LBS | SYSTOLIC BLOOD PRESSURE: 126 MMHG | HEART RATE: 76 BPM

## 2022-12-19 DIAGNOSIS — G43.901 STATUS MIGRAINOSUS: Primary | ICD-10-CM

## 2022-12-19 PROCEDURE — 25000003 PHARM REV CODE 250: Mod: PN | Performed by: PSYCHIATRY & NEUROLOGY

## 2022-12-19 PROCEDURE — 63600175 PHARM REV CODE 636 W HCPCS: Mod: PN | Performed by: PSYCHIATRY & NEUROLOGY

## 2022-12-19 PROCEDURE — 96365 THER/PROPH/DIAG IV INF INIT: CPT | Mod: PN

## 2022-12-19 PROCEDURE — 96375 TX/PRO/DX INJ NEW DRUG ADDON: CPT | Mod: PN

## 2022-12-19 PROCEDURE — 96367 TX/PROPH/DG ADDL SEQ IV INF: CPT | Mod: PN

## 2022-12-19 RX ORDER — DIPHENHYDRAMINE HYDROCHLORIDE 50 MG/ML
25 INJECTION INTRAMUSCULAR; INTRAVENOUS
Status: COMPLETED | OUTPATIENT
Start: 2022-12-19 | End: 2022-12-19

## 2022-12-19 RX ORDER — KETOROLAC TROMETHAMINE 30 MG/ML
30 INJECTION, SOLUTION INTRAMUSCULAR; INTRAVENOUS
Status: CANCELLED | OUTPATIENT
Start: 2022-12-19 | End: 2022-12-19

## 2022-12-19 RX ORDER — DIHYDROERGOTAMINE MESYLATE 1 MG/ML
0.3 INJECTION, SOLUTION INTRAMUSCULAR; INTRAVENOUS; SUBCUTANEOUS ONCE
Status: CANCELLED
Start: 2022-12-19 | End: 2022-12-19

## 2022-12-19 RX ORDER — KETOROLAC TROMETHAMINE 30 MG/ML
30 INJECTION, SOLUTION INTRAMUSCULAR; INTRAVENOUS
Status: COMPLETED | OUTPATIENT
Start: 2022-12-19 | End: 2022-12-19

## 2022-12-19 RX ORDER — PROCHLORPERAZINE EDISYLATE 5 MG/ML
10 INJECTION INTRAMUSCULAR; INTRAVENOUS
Status: COMPLETED | OUTPATIENT
Start: 2022-12-19 | End: 2022-12-19

## 2022-12-19 RX ORDER — DIPHENHYDRAMINE HYDROCHLORIDE 50 MG/ML
25 INJECTION INTRAMUSCULAR; INTRAVENOUS
Status: CANCELLED | OUTPATIENT
Start: 2022-12-19 | End: 2022-12-19

## 2022-12-19 RX ORDER — DIHYDROERGOTAMINE MESYLATE 1 MG/ML
0.3 INJECTION, SOLUTION INTRAMUSCULAR; INTRAVENOUS; SUBCUTANEOUS ONCE
Status: DISCONTINUED | OUTPATIENT
Start: 2022-12-19 | End: 2022-12-19 | Stop reason: SDUPTHER

## 2022-12-19 RX ORDER — MAGNESIUM SULFATE 1 G/100ML
1 INJECTION INTRAVENOUS ONCE
Status: COMPLETED | OUTPATIENT
Start: 2022-12-19 | End: 2022-12-19

## 2022-12-19 RX ORDER — PROCHLORPERAZINE EDISYLATE 5 MG/ML
10 INJECTION INTRAMUSCULAR; INTRAVENOUS
Status: CANCELLED | OUTPATIENT
Start: 2022-12-19 | End: 2022-12-19

## 2022-12-19 RX ADMIN — DIHYDROERGOTAMINE MESYLATE: 1 INJECTION, SOLUTION INTRAMUSCULAR; INTRAVENOUS; SUBCUTANEOUS at 04:12

## 2022-12-19 RX ADMIN — KETOROLAC TROMETHAMINE 30 MG: 30 INJECTION, SOLUTION INTRAMUSCULAR; INTRAVENOUS at 03:12

## 2022-12-19 RX ADMIN — PROCHLORPERAZINE EDISYLATE 10 MG: 5 INJECTION INTRAMUSCULAR; INTRAVENOUS at 03:12

## 2022-12-19 RX ADMIN — DIPHENHYDRAMINE HYDROCHLORIDE 25 MG: 50 INJECTION INTRAMUSCULAR; INTRAVENOUS at 03:12

## 2022-12-19 RX ADMIN — MAGNESIUM SULFATE IN DEXTROSE 1 G: 10 INJECTION, SOLUTION INTRAVENOUS at 03:12

## 2022-12-19 NOTE — TELEPHONE ENCOUNTER
----- Message from Sis Ortez sent at 12/19/2022 12:19 PM CST -----  Type: Needs Medical Advice  Who Called:  Patient   Symptoms (please be specific):    How long has patient had these symptoms:    Pharmacy name and phone #:    Best Call Back Number: [phone]  Additional Information: Patient is requesting a call back to schedule an infusion appt. from referral for today due to severe migraines.

## 2022-12-20 ENCOUNTER — PATIENT MESSAGE (OUTPATIENT)
Dept: NEUROLOGY | Facility: CLINIC | Age: 57
End: 2022-12-20
Payer: COMMERCIAL

## 2022-12-21 ENCOUNTER — INFUSION (OUTPATIENT)
Dept: INFUSION THERAPY | Facility: HOSPITAL | Age: 57
End: 2022-12-21
Attending: PSYCHIATRY & NEUROLOGY
Payer: COMMERCIAL

## 2022-12-21 VITALS
SYSTOLIC BLOOD PRESSURE: 123 MMHG | HEIGHT: 64 IN | RESPIRATION RATE: 16 BRPM | DIASTOLIC BLOOD PRESSURE: 81 MMHG | TEMPERATURE: 98 F | BODY MASS INDEX: 25.38 KG/M2 | WEIGHT: 148.69 LBS | HEART RATE: 82 BPM

## 2022-12-21 DIAGNOSIS — G43.901 STATUS MIGRAINOSUS: Primary | ICD-10-CM

## 2022-12-21 PROCEDURE — 96365 THER/PROPH/DIAG IV INF INIT: CPT | Mod: PN

## 2022-12-21 PROCEDURE — 96375 TX/PRO/DX INJ NEW DRUG ADDON: CPT | Mod: PN

## 2022-12-21 PROCEDURE — 25000003 PHARM REV CODE 250: Mod: PN | Performed by: PSYCHIATRY & NEUROLOGY

## 2022-12-21 PROCEDURE — 96367 TX/PROPH/DG ADDL SEQ IV INF: CPT | Mod: PN

## 2022-12-21 PROCEDURE — 63600175 PHARM REV CODE 636 W HCPCS: Mod: PN | Performed by: PSYCHIATRY & NEUROLOGY

## 2022-12-21 RX ORDER — DIHYDROERGOTAMINE MESYLATE 1 MG/ML
0.3 INJECTION, SOLUTION INTRAMUSCULAR; INTRAVENOUS; SUBCUTANEOUS ONCE
Status: CANCELLED
Start: 2022-12-21 | End: 2022-12-21

## 2022-12-21 RX ORDER — PROCHLORPERAZINE EDISYLATE 5 MG/ML
10 INJECTION INTRAMUSCULAR; INTRAVENOUS
Status: CANCELLED | OUTPATIENT
Start: 2022-12-21 | End: 2022-12-21

## 2022-12-21 RX ORDER — DIPHENHYDRAMINE HYDROCHLORIDE 50 MG/ML
25 INJECTION INTRAMUSCULAR; INTRAVENOUS
Status: CANCELLED | OUTPATIENT
Start: 2022-12-21 | End: 2022-12-21

## 2022-12-21 RX ORDER — KETOROLAC TROMETHAMINE 30 MG/ML
30 INJECTION, SOLUTION INTRAMUSCULAR; INTRAVENOUS
Status: COMPLETED | OUTPATIENT
Start: 2022-12-21 | End: 2022-12-21

## 2022-12-21 RX ORDER — DIPHENHYDRAMINE HYDROCHLORIDE 50 MG/ML
25 INJECTION INTRAMUSCULAR; INTRAVENOUS
Status: COMPLETED | OUTPATIENT
Start: 2022-12-21 | End: 2022-12-21

## 2022-12-21 RX ORDER — DIHYDROERGOTAMINE MESYLATE 1 MG/ML
0.3 INJECTION, SOLUTION INTRAMUSCULAR; INTRAVENOUS; SUBCUTANEOUS ONCE
Status: DISCONTINUED | OUTPATIENT
Start: 2022-12-21 | End: 2022-12-21 | Stop reason: SDUPTHER

## 2022-12-21 RX ORDER — PROCHLORPERAZINE EDISYLATE 5 MG/ML
10 INJECTION INTRAMUSCULAR; INTRAVENOUS
Status: COMPLETED | OUTPATIENT
Start: 2022-12-21 | End: 2022-12-21

## 2022-12-21 RX ORDER — KETOROLAC TROMETHAMINE 30 MG/ML
30 INJECTION, SOLUTION INTRAMUSCULAR; INTRAVENOUS
Status: CANCELLED | OUTPATIENT
Start: 2022-12-21 | End: 2022-12-21

## 2022-12-21 RX ORDER — MAGNESIUM SULFATE 1 G/100ML
1 INJECTION INTRAVENOUS ONCE
Status: COMPLETED | OUTPATIENT
Start: 2022-12-21 | End: 2022-12-21

## 2022-12-21 RX ADMIN — DIPHENHYDRAMINE HYDROCHLORIDE 25 MG: 50 INJECTION INTRAMUSCULAR; INTRAVENOUS at 10:12

## 2022-12-21 RX ADMIN — PROCHLORPERAZINE EDISYLATE 10 MG: 5 INJECTION INTRAMUSCULAR; INTRAVENOUS at 10:12

## 2022-12-21 RX ADMIN — KETOROLAC TROMETHAMINE 30 MG: 30 INJECTION, SOLUTION INTRAMUSCULAR; INTRAVENOUS at 10:12

## 2022-12-21 RX ADMIN — MAGNESIUM SULFATE IN DEXTROSE 1 G: 10 INJECTION, SOLUTION INTRAVENOUS at 11:12

## 2022-12-21 RX ADMIN — DIHYDROERGOTAMINE MESYLATE: 1 INJECTION, SOLUTION INTRAMUSCULAR; INTRAVENOUS; SUBCUTANEOUS at 10:12

## 2022-12-21 NOTE — PLAN OF CARE
Problem: Adult Inpatient Plan of Care  Goal: Patient-Specific Goal (Individualized)  Outcome: Ongoing, Progressing  Flowsheets (Taken 12/21/2022 1300)  Anxieties, Fears or Concerns: None  Individualized Care Needs: Recliner, blanket  Patient-Specific Goals (Include Timeframe): Infection prevention during treatment.     Problem: Fatigue  Goal: Improved Activity Tolerance  Intervention: Promote Improved Energy  Flowsheets (Taken 12/21/2022 1300)  Fatigue Management:   activity schedule adjusted   frequent rest breaks encouraged   paced activity encouraged   fatigue-related activity identified  Sleep/Rest Enhancement:   regular sleep/rest pattern promoted   relaxation techniques promoted  Activity Management:   Ambulated -L4   Ambulated in [place] - L4     Problem: Adult Inpatient Plan of Care  Goal: Plan of Care Review  Outcome: Ongoing, Progressing  Flowsheets (Taken 12/21/2022 1300)  Plan of Care Reviewed With: patient  Tolerated treatment with no known distress.  Ambulated from infusion center with steady gait.

## 2022-12-28 ENCOUNTER — PATIENT MESSAGE (OUTPATIENT)
Dept: NEUROLOGY | Facility: CLINIC | Age: 57
End: 2022-12-28
Payer: COMMERCIAL

## 2022-12-29 ENCOUNTER — TELEPHONE (OUTPATIENT)
Dept: INFUSION THERAPY | Facility: HOSPITAL | Age: 57
End: 2022-12-29
Payer: COMMERCIAL

## 2022-12-29 NOTE — TELEPHONE ENCOUNTER
Pt to call back to coordinate appts, she is available mondays and fridays, explained best to get on consecutive days and she will call me back.

## 2022-12-30 ENCOUNTER — PATIENT MESSAGE (OUTPATIENT)
Dept: NEUROLOGY | Facility: CLINIC | Age: 57
End: 2022-12-30
Payer: COMMERCIAL

## 2022-12-30 RX ORDER — RIZATRIPTAN BENZOATE 10 MG/1
TABLET ORAL
Qty: 18 TABLET | Refills: 11 | Status: SHIPPED | OUTPATIENT
Start: 2022-12-30 | End: 2023-03-08 | Stop reason: CLARIF

## 2023-01-04 ENCOUNTER — OFFICE VISIT (OUTPATIENT)
Dept: NEUROLOGY | Facility: CLINIC | Age: 58
End: 2023-01-04
Payer: COMMERCIAL

## 2023-01-04 VITALS
HEART RATE: 100 BPM | HEIGHT: 64 IN | BODY MASS INDEX: 25.27 KG/M2 | DIASTOLIC BLOOD PRESSURE: 73 MMHG | RESPIRATION RATE: 17 BRPM | SYSTOLIC BLOOD PRESSURE: 101 MMHG | WEIGHT: 148 LBS

## 2023-01-04 DIAGNOSIS — G43.901 STATUS MIGRAINOSUS: Primary | ICD-10-CM

## 2023-01-04 DIAGNOSIS — R51.9 FACIAL PAIN: ICD-10-CM

## 2023-01-04 DIAGNOSIS — G43.711 CHRONIC MIGRAINE WITHOUT AURA, WITH INTRACTABLE MIGRAINE, SO STATED, WITH STATUS MIGRAINOSUS: ICD-10-CM

## 2023-01-04 PROCEDURE — 3078F PR MOST RECENT DIASTOLIC BLOOD PRESSURE < 80 MM HG: ICD-10-PCS | Mod: CPTII,S$GLB,, | Performed by: PSYCHIATRY & NEUROLOGY

## 2023-01-04 PROCEDURE — 64400 NJX AA&/STRD TRIGEMINAL NRV: CPT | Mod: 50,S$GLB,, | Performed by: PSYCHIATRY & NEUROLOGY

## 2023-01-04 PROCEDURE — 64400: ICD-10-PCS | Mod: 50,S$GLB,, | Performed by: PSYCHIATRY & NEUROLOGY

## 2023-01-04 PROCEDURE — 99215 OFFICE O/P EST HI 40 MIN: CPT | Mod: 25,S$GLB,, | Performed by: PSYCHIATRY & NEUROLOGY

## 2023-01-04 PROCEDURE — 1159F MED LIST DOCD IN RCRD: CPT | Mod: CPTII,S$GLB,, | Performed by: PSYCHIATRY & NEUROLOGY

## 2023-01-04 PROCEDURE — 1159F PR MEDICATION LIST DOCUMENTED IN MEDICAL RECORD: ICD-10-PCS | Mod: CPTII,S$GLB,, | Performed by: PSYCHIATRY & NEUROLOGY

## 2023-01-04 PROCEDURE — 99215 PR OFFICE/OUTPT VISIT, EST, LEVL V, 40-54 MIN: ICD-10-PCS | Mod: 25,S$GLB,, | Performed by: PSYCHIATRY & NEUROLOGY

## 2023-01-04 PROCEDURE — 99999 PR PBB SHADOW E&M-EST. PATIENT-LVL III: ICD-10-PCS | Mod: PBBFAC,,, | Performed by: PSYCHIATRY & NEUROLOGY

## 2023-01-04 PROCEDURE — 3008F PR BODY MASS INDEX (BMI) DOCUMENTED: ICD-10-PCS | Mod: CPTII,S$GLB,, | Performed by: PSYCHIATRY & NEUROLOGY

## 2023-01-04 PROCEDURE — 3078F DIAST BP <80 MM HG: CPT | Mod: CPTII,S$GLB,, | Performed by: PSYCHIATRY & NEUROLOGY

## 2023-01-04 PROCEDURE — 99999 PR PBB SHADOW E&M-EST. PATIENT-LVL III: CPT | Mod: PBBFAC,,, | Performed by: PSYCHIATRY & NEUROLOGY

## 2023-01-04 PROCEDURE — 3074F PR MOST RECENT SYSTOLIC BLOOD PRESSURE < 130 MM HG: ICD-10-PCS | Mod: CPTII,S$GLB,, | Performed by: PSYCHIATRY & NEUROLOGY

## 2023-01-04 PROCEDURE — 3008F BODY MASS INDEX DOCD: CPT | Mod: CPTII,S$GLB,, | Performed by: PSYCHIATRY & NEUROLOGY

## 2023-01-04 PROCEDURE — 3074F SYST BP LT 130 MM HG: CPT | Mod: CPTII,S$GLB,, | Performed by: PSYCHIATRY & NEUROLOGY

## 2023-01-04 RX ORDER — LASMIDITAN 100 MG/1
100 TABLET ORAL 2 TIMES DAILY PRN
Qty: 8 TABLET | Refills: 11 | Status: SHIPPED | OUTPATIENT
Start: 2023-01-04

## 2023-01-04 NOTE — PROGRESS NOTES
Subjective:       Patient ID: Mayra Smart is a 57 y.o. female.    Chief Complaint: Headache    INTERVAL HISTORY 1/4/2023  The patient presents for follow up. She is still in status migrainosus. This happened when she missed a dose of Ajovy. Unfortunately, even when she restarted it, the headache did not remit. I sent her for IV infusions consecutive days but this was not feasible, not very effective. Nurtec and Ubrelvy not effective. She uses Maxalt, Relpax. Acutely aware of medication overuse headache. She presents to discuss options.    INTERVAL HISTORY 12/6/22  Patient presents for follow up. Last office visit was with me a year and a half ago. She is on Botox with Dr. Kowalski and most recent Botox session was 9/23/22.    Current prevention - Botox, Ajovy (missed last two months)  Current acute - eletriptan, Ubrelvy    Today she reports she is much worse. She reports her Ajovy was discontinued unexpectedly. She states it was not shipped automatically and she is unsure why. She has headaches in the sides of the head, eyes, between eyebrows. Current pain 8 with range 0-10. She has had a daily headache since last Wednesday. Before that she was having 4 migraines per week. Both Ajovy and Ubrelvy are currently at [facility] pending PA. She states she has taken Zofran last night without much improvement. Otherwise information below is reviewed and verified with no changes made     INTERVAL HISTORY 7/29/21  The patient location is: home  The chief complaint leading to consultation is: follow up  Visit type: audiovisual  Face to Face time with patient: 15  25 minutes of total time spent on the encounter, which includes face to face time and non-face to face time preparing to see the patient (eg, review of tests), Obtaining and/or reviewing separately obtained history, Documenting clinical information in the electronic or other health record, Independently interpreting results (not separately reported) and  communicating results to the patient/family/caregiver, or Care coordination (not separately reported).   Each patient to whom he or she provides medical services by telemedicine is:  (1) informed of the relationship between the physician and patient and the respective role of any other health care provider with respect to management of the patient; and (2) notified that he or she may decline to receive medical services by telemedicine and may withdraw from such care at any time.    Notes:     Last office visit with Dr. Kowalski was almost two years ago. She has been on Botox since 9/6/19 with most recent session being 7/23/21. She is also on Emgality.     Today she reports she is a little better. She had taken almost 9 months off of Botox due to insurance. She has had two sessions on schedule again. She reports a daily headache with weekly escalations to migraine. She has several migraine abortives including Ubrelvy, Relpax, Zomig, and Imitrex. She feels nothing is working. She previously tried Nurtec and it was not effective. Current migraine cycle ongoing for past 6 days.     INTERVAL HISTORY 8/26/2019  The patient comes for follow up. She was doing very well on Emgality until end of May when it stopped working, now she is having headaches about 20 days per month. Using acute treatmetn almost every day. Waking up with a headache almost daily. Has never had a sleep study. Steroids in June given by her PCP provided no help. Excedrin approximately 20 per month, also taking Relpax and Advil. Relpax was recalled last week (traces of contaminant), Diamox did not help for weather related headaches. Already on Spironolactone. She has a new TMJ splint with undetermined benefit.     HPI   The patient is a pleasant 54 y/o female presenting with chief complaint of headaches. She first developed them around the age of 38 for no apparent reason. She was under the care of a neurologist for a long time. She has tried multiple  "medications without benefit or poor tolerability as listed below. She has tried different diets, including the "JJ diet" with no help. Hormonal adjustments ineffective as well. Drug holiday for 6 weeks has not helped. Amongst the acute medications, Relpax works the best, she has tried all other triptans, fioricet, fiorinal, and OTC analgesics.  Please see details of headache characteristics below.  Headache questionnaire    1. When did your Headaches start?    15 years ago      2. Where are your headaches located?   Temporal and behind eyes      3. Your headache's characteristics:   Pressure, Throbbing, Pounding, Stabbing, Like a tight band, Sharp      4. How long does the headache last?   days      5. How often does the headache occur?   daily      6. Are your headaches preceded or accompanied by other symptoms? yes   If yes, please describe.  Sometimes nausea      7. Does the headache awaken you at night? yes   If so, how often? 4-5 times a night        8. Please oz the word that best describes your headache's intensity:    severe      9. Using a scale of 1 through 10, with 0 = no pain and 10 = the worst pain:   What score is your headache now? 4   What score is your headache at its worst? 10   What score is your headache at its best? 0        10. Possible associated headache symptoms:  [x]  Sensitivity to light  [x] Dizziness  [] Nasal or sinus pressure/ pain   [x] Sensitivity to noise  [x] Vertigo  [] Problems with concentration  [x] Sensitivity to smells  [] Ringing in ears  [] Problems with memory    [] Blurred vision  [] Irritability  [] Problems with task completion   [] Double vision  [] Anger  []  Problems with relaxation  [x] Loss of appetite  [] Anxiety  [x] Neck tightness, Neck pain  [x] Nausea   [] Nasal congestion  [x] Vomiting         11. Headache improving factors:  [] Sleep  [] Heat  [] Darkness  [x] Ice  [] Local pressure [] Menses (period)  [] Massage   [x] Medications:        12. Headache " worsening factors:   [] Fatigue [] Sneezing  [x] Changes in Weather  [] Light [] Bending Over [] Stress  [] Noise [] Ovulation  [] Multiple Sclerosis Flare-Up  [] Smells  [] Menses  [] Food   [] Coughing [x] Alcohol      13. Number of caffeinated drinks per day: 1      14. Number of diet drinks per day:  0      15. Have you seen any other [facility] within the last 3 years?  No      Please Check any Medications Tried for Headache    AED Neuromodulators  MAOIs  Ergot Alkaloids    Acetazolamide (Diamox) [] Phenelzine (Nardil) [] Dihydroergotamine (Migranal) []   Carbamazepine (Tegretol) [] Tranylcypromine (Parnate) [] Ergotamine (Ergomar) []   Gabapentin (Neurontin) [x] Antihistamine/Serotonergic  Triptans    Lacosamide (Vimpat) [] Cyproheptadine (Periactin) [x] Almotriptan (Axert) [x]   Lamotrigine (Lamictal) [] Antihypertensives  Eletriptan (Relpax) [x]   Levatiracetam (Keppra) [] Atenolol (Tenormin) [] Frovatriptan (Frova) [x]   Oxcarbazepine (Trileptal) [] Bisoprostol (Zebeta) [] Naratriptan (Amerge) []   Phenobarbital [] Candesartan (Atacand) [] Rizatriptan (Maxalt) [x]   Phenytoin (Dilantin) [] Diltiazem (Cardizem) [] Sumatriptan (Imitrex) [x]   Pregabalin (Lyrica) [] Lisinopril (Prinivil, Zestril) [x] Zolmitriptan (Zomig) [x]   Primidone (Mysoline) [] Metoprolol (Toprol) [x] Combo Abortives    Tiagabine (Gabatril) [] Nadolol (Corgard) [x] Butalbital and Acetaminophen (Bupap) []   Topiramate (Topamax)  (Trokendi) [x] Nicardipine (Cardene) []     Vigabatrin (Sabril) [] Nimodipine (Nimotop) [] Butalbital, Acetaminophen, and caffeine (Fioricet) [x]   Valproic Acid (Depakote) (Divalproex Sodium) [] Propranolol (Inderal) [x]     Zonisamide (Zonegran) [] Telmisartan (Micardis) [] Butalbital, Aspirin, and caffeine (Fiorinal) [x]   Benzodiazepines  Timolol (Blocadren) []     Alprazolam (Xanax) [x] Verapamil (Calan, Verelan) [] Butalbital, Caffeine, Acetaminophen, and Codeine (Fioricet with Codeine) []    Diazepam (Valium) [x] NSAIDs      Lorazepam (Ativan) [x] Acetaminophen (Tylenol) [x]     Clonazepam (Klonopin) [] Acetylsalicylic Acid (Aspirin) [x] Butalbital, Caffeine, Aspirin, and Codeine  (Fiorinal with Codeine) []   Antidepressants  Diclofenac (Cambia) []     Amitriptyline (Elavil) [] Ibuprofen (Motrin) [x]     Desipramine (Norpramin) [] Indomethacin (Indocin) [x] Aspirin, Caffeine, and Acetaminophen (Excedrin) (Goodys) []   Doxepin (Sinequan) [] Ketoprofen (Orudis) [x]     Fluoxetine (Prozac) [] Ketorolac (Toradol) [x] Acetaminophen, Dichloralphenazone, and Isometheptene (Midrin) []   Imipramine (Tofranil) [] Naproxen (Anaprox) (Aleve) [x]     Nortriptyline (Pamelor) [] Meclofenamic Acid (Meclomen) []     Diamox        Venlafaxine (Effexor) [] Meloxicam (Mobic) [] Aspirin, Salicylamide, and Caffeine (BC Powder) [x]          Review of Systems   Constitutional: Positive for fatigue. Negative for activity change, appetite change and fever.   HENT: Negative for congestion, dental problem, hearing loss, sinus pressure, tinnitus, trouble swallowing and voice change.    Eyes: Negative for photophobia, pain, redness and visual disturbance.   Respiratory: Negative for cough, chest tightness and shortness of breath.    Cardiovascular: Negative for chest pain, palpitations and leg swelling.   Gastrointestinal: Negative for abdominal pain, blood in stool, nausea and vomiting.   Endocrine: Positive for cold intolerance and heat intolerance.   Genitourinary: Negative for difficulty urinating, frequency, menstrual problem and urgency.   Musculoskeletal: Negative for arthralgias, back pain, gait problem, joint swelling, myalgias, neck pain and neck stiffness.   Skin: Negative.    Neurological: Positive for headaches. Negative for dizziness, tremors, seizures, syncope, facial asymmetry, speech difficulty, weakness, light-headedness and numbness.   Hematological: Negative for adenopathy. Does not bruise/bleed easily.  "  Psychiatric/Behavioral: Negative for agitation, behavioral problems, confusion, decreased concentration, self-injury, sleep disturbance and suicidal ideas. The patient is not nervous/anxious and is not hyperactive.            Past Medical History:   Diagnosis Date    Back pain     Bell's palsy     Breast disorder     fibercystic tissue    Fibroids     uterine    GERD (gastroesophageal reflux disease)     History of anesthesia reaction     sensitive to anesthesia, " Major hangover"    Neck pain     Pelvic pain in female     left lower quadrant    Preeclampsia      Past Surgical History:   Procedure Laterality Date     SECTION      times 2    CYSTOSCOPY N/A 2012    Performed by Terrance Pereira MD at [facility] OR    DILATION AND CURETTAGE OF UTERUS  2007    ENDOMETRIAL ABLATION  10/22/2008    BTL    HYSTERECTOMY, TOTAL, LAPAROSCOPIC N/A 2012    Performed by Terrance Pereira MD at [facility] OR    SALPINGECTOMY, LAPAROSCOPIC Right 2012    Performed by Terrance Pereira MD at [facility] OR    SALPINGO-OOPHORECTOMY Left 2012    Performed by Terrance Pereira MD at [facility] OR     Family History   Problem Relation Age of Onset    Breast cancer Neg Hx     Ovarian cancer Neg Hx     Pulmonary embolism Mother      Social History     Socioeconomic History    Marital status:      Spouse name: Not on file    Number of children: Not on file    Years of education: Not on file    Highest education level: Not on file   Social Needs    Financial resource strain: Not on file    Food insecurity - worry: Not on file    Food insecurity - inability: Not on file    Transportation needs - medical: Not on file    Transportation needs - non-medical: Not on file   Occupational History    Not on file   Tobacco Use    Smoking status: Never Smoker   Substance and Sexual Activity    Alcohol use: Yes     Comment: ocassionally    Drug use: No    Sexual activity: Yes   Other Topics Concern    Not on file   Social History " Narrative    Not on file     Review of patient's allergies indicates:   Allergen Reactions    Cephalosporins Anaphylaxis    Penicillins Anaphylaxis    Codeine Itching and Swelling       Current Outpatient Medications:     eletriptan (RELPAX) 40 MG tablet, Take 40 mg by mouth as needed. may repeat in 2 hours if necessary , Disp: , Rfl:     levothyroxine (TIROSINT) 75 mcg Cap, Take 75 mcg by mouth once daily.  , Disp: , Rfl:     liothyronine (CYTOMEL) 5 MCG Tab, , Disp: , Rfl:     MINIVELLE 0.05 mg/24 hr, , Disp: , Rfl:     progesterone (PROMETRIUM) 100 MG capsule, TK 1 C PO QHS, Disp: , Rfl: 6    spironolactone (ALDACTONE) 50 MG tablet, , Disp: , Rfl:     VENTOLIN HFA 90 mcg/actuation inhaler, , Disp: , Rfl:     DEXLANSOPRAZOLE (DEXILANT ORAL), Take by mouth every morning. , Disp: , Rfl:     eletriptan (RELPAX) 40 MG tablet, may repeat in 2 hours if necessary. Patient has tried and failed all the other triptans, Disp: 9 tablet, Rfl: 5    galcanezumab-gnlm (EMGALITY) 120 mg/mL PnIj, Inject 120 mg into the skin every 28 days., Disp: 1 Syringe, Rfl: 5    ketorolac (SPRIX) 15.75 mg/spray Spry, 15.75 mg by Nasal route every 6 (six) hours., Disp: 5 each, Rfl: 3      Objective:      Vitals:    01/04/23 1053   BP: 101/73   Pulse: 100   Resp: 17     Body mass index is 25.4 kg/m².    Physical Exam     Constitutional:   She appears well-developed and well-nourished. She is well groomed. Appears in significant pain. Sunglasses on and lights in room dimmed to as low as possible.      Neurological Exam:  General: well-developed, well-nourished  Tenderness to palpation of the auriculotemporal and left supraorbital nerves  Mental status: Awake and alert  Speech language: No dysarthria or aphasia on conversation  Cranial nerves: Face symmetric  Motor: Moves all extremities well  Coordination: No ataxia. No tremor.    Review of Data:    Lab Results   Component Value Date     08/04/2022    K 4.0 08/04/2022     08/04/2022     CO2 27 08/04/2022    BUN 11 08/04/2022    CREATININE 0.76 08/04/2022    GLU 97 08/04/2022    HGBA1C 5.6 11/09/2021    AST 16 02/22/2022    ALT 13 02/22/2022    ALBUMIN 4.0 02/22/2022    PROT 7.2 02/22/2022    BILITOT 0.5 02/22/2022    CHOL 235 (H) 09/12/2022    HDL 52 09/12/2022    LDLCALC 161.8 (H) 09/12/2022    TRIG 106 09/12/2022       Lab Results   Component Value Date    WBC 2.14 (L) 08/04/2022    HGB 12.7 08/04/2022    HCT 37.3 08/04/2022    MCV 89 08/04/2022     08/04/2022       Lab Results   Component Value Date    TSH 0.543 09/12/2022       LEFT SUPRAORBITAL NERVE BLOCK    After obtaining informed consent, the patient was positioned in supine position on the exam table.  The patients eye bow region regions were prepped in the usual sterile fashion. Area of maximal tenderness was identified by palpation. A 30 gauge, 0.5 inch needle was introduced under the skin down to the periosteum just above the medial border of the eye brow. After aspiration was negative or blood approximately 1.5 cc of 0.25% bupivacaine was injected. The same procedure was repeated on the other side. The patient tolerated the procedure well with no adverse events. The patient was able to get up from the table under their own power.  AURICULOTEMPORAL Block, bilateral  After having been explained the risks and benefits of the procedure, the patient provided consent . Then, the area over the auriculotemporal nerves was prepped with alcohol. Using a 30 gauge 1/2 inch needle, 1mL of local anesthetic was injected into each of the nerves bilaterally. There was negative aspiration for blood. The patient tolerated the procedure well without any apparent complications.  The local anesthetic was bupivacaine 0.25%.  The patient tolerated the procedure well and no complications were encountered.      Assessment and Plan     The patient has chronic migraines ( G43.719) and suffers from headaches more than 15 days a month lasting more than 4  hours a day with no relief of symptoms despite trying multiple medications as listed above (Gabapentin, Topamax, Toprol, Propranolol, Lisinopril, Emgality).     Currently in the middle of a flare up. On Botox and Ajovy however she has missed two months of Ajovy and since she in status migrainous in spite of resuming treatment. IV infusions to restart next week.     Start Vyepti 300 mg IV q 3 months. Start in 3 weeks since she just had Ajovy yesterday      For acute treatment, Nurtec and Ubrelvy dose to 100 mg have lost efficacy. Discussed overuse and having multiple triptans can lead to accidental medication overuse headache.    Add Reyvow 100 mg for rescue #8 tabs with plans to increase to 200 depending upon efficacy and tolerability    Good response to bilateral auriculotemporal nerve blocks and left sided supraorbital nerve block    I have discussed the side effects of the medications prescribed and the patient acknowledges understanding    I spent 40 minutes on the day of this encounter preparing, treating, and managing this patient with intractable migraine headaches.      Hemanth Kowalski M.D  Medical Director, Headache and Facial Pain  [facility]

## 2023-01-06 ENCOUNTER — TELEPHONE (OUTPATIENT)
Dept: PHARMACY | Facility: CLINIC | Age: 58
End: 2023-01-06
Payer: COMMERCIAL

## 2023-01-10 ENCOUNTER — PATIENT MESSAGE (OUTPATIENT)
Dept: NEUROLOGY | Facility: CLINIC | Age: 58
End: 2023-01-10
Payer: COMMERCIAL

## 2023-01-10 PROBLEM — G43.719 INTRACTABLE CHRONIC MIGRAINE WITHOUT AURA AND WITHOUT STATUS MIGRAINOSUS: Status: ACTIVE | Noted: 2023-01-10

## 2023-01-10 RX ORDER — ONDANSETRON 2 MG/ML
8 INJECTION INTRAMUSCULAR; INTRAVENOUS ONCE AS NEEDED
Status: CANCELLED | OUTPATIENT
Start: 2023-01-25

## 2023-01-10 RX ORDER — ACETAMINOPHEN 500 MG
1000 TABLET ORAL ONCE AS NEEDED
Status: CANCELLED | OUTPATIENT
Start: 2023-01-25

## 2023-01-10 RX ORDER — SODIUM CHLORIDE 0.9 % (FLUSH) 0.9 %
10 SYRINGE (ML) INJECTION
Status: CANCELLED | OUTPATIENT
Start: 2023-01-25

## 2023-01-10 RX ORDER — DIPHENHYDRAMINE HYDROCHLORIDE 50 MG/ML
25 INJECTION INTRAMUSCULAR; INTRAVENOUS ONCE AS NEEDED
Status: CANCELLED | OUTPATIENT
Start: 2023-01-25

## 2023-01-10 RX ORDER — SODIUM CHLORIDE 9 MG/ML
INJECTION, SOLUTION INTRAVENOUS ONCE AS NEEDED
Status: CANCELLED | OUTPATIENT
Start: 2023-01-25

## 2023-01-11 ENCOUNTER — LAB VISIT (OUTPATIENT)
Dept: LAB | Facility: HOSPITAL | Age: 58
End: 2023-01-11
Payer: COMMERCIAL

## 2023-01-11 DIAGNOSIS — E78.2 MIXED HYPERLIPIDEMIA: ICD-10-CM

## 2023-01-11 DIAGNOSIS — E03.8 HYPOTHYROIDISM DUE TO HASHIMOTO'S THYROIDITIS: ICD-10-CM

## 2023-01-11 DIAGNOSIS — R00.2 PALPITATIONS: ICD-10-CM

## 2023-01-11 DIAGNOSIS — I10 PRIMARY HYPERTENSION: ICD-10-CM

## 2023-01-11 DIAGNOSIS — E06.3 HYPOTHYROIDISM DUE TO HASHIMOTO'S THYROIDITIS: ICD-10-CM

## 2023-01-11 LAB
ALBUMIN SERPL BCP-MCNC: 4.3 G/DL (ref 3.5–5.2)
ALP SERPL-CCNC: 62 U/L (ref 55–135)
ALT SERPL W/O P-5'-P-CCNC: 12 U/L (ref 10–44)
ANION GAP SERPL CALC-SCNC: 8 MMOL/L (ref 8–16)
AST SERPL-CCNC: 20 U/L (ref 10–40)
BASOPHILS # BLD AUTO: 0.04 K/UL (ref 0–0.2)
BASOPHILS NFR BLD: 1.4 % (ref 0–1.9)
BILIRUB SERPL-MCNC: 0.5 MG/DL (ref 0.1–1)
BUN SERPL-MCNC: 9 MG/DL (ref 6–20)
CALCIUM SERPL-MCNC: 9.7 MG/DL (ref 8.7–10.5)
CHLORIDE SERPL-SCNC: 107 MMOL/L (ref 95–110)
CHOLEST SERPL-MCNC: 157 MG/DL (ref 120–199)
CHOLEST/HDLC SERPL: 3.5 {RATIO} (ref 2–5)
CO2 SERPL-SCNC: 23 MMOL/L (ref 23–29)
CREAT SERPL-MCNC: 0.8 MG/DL (ref 0.5–1.4)
DIFFERENTIAL METHOD: ABNORMAL
EOSINOPHIL # BLD AUTO: 0.1 K/UL (ref 0–0.5)
EOSINOPHIL NFR BLD: 4.6 % (ref 0–8)
ERYTHROCYTE [DISTWIDTH] IN BLOOD BY AUTOMATED COUNT: 12.3 % (ref 11.5–14.5)
EST. GFR  (NO RACE VARIABLE): >60 ML/MIN/1.73 M^2
ESTIMATED AVG GLUCOSE: 108 MG/DL (ref 68–131)
GLUCOSE SERPL-MCNC: 96 MG/DL (ref 70–110)
HBA1C MFR BLD: 5.4 % (ref 4–5.6)
HCT VFR BLD AUTO: 40.1 % (ref 37–48.5)
HDLC SERPL-MCNC: 45 MG/DL (ref 40–75)
HDLC SERPL: 28.7 % (ref 20–50)
HGB BLD-MCNC: 13 G/DL (ref 12–16)
IMM GRANULOCYTES # BLD AUTO: 0 K/UL (ref 0–0.04)
IMM GRANULOCYTES NFR BLD AUTO: 0 % (ref 0–0.5)
LDLC SERPL CALC-MCNC: 99.2 MG/DL (ref 63–159)
LYMPHOCYTES # BLD AUTO: 1.1 K/UL (ref 1–4.8)
LYMPHOCYTES NFR BLD: 37.4 % (ref 18–48)
MCH RBC QN AUTO: 29.4 PG (ref 27–31)
MCHC RBC AUTO-ENTMCNC: 32.4 G/DL (ref 32–36)
MCV RBC AUTO: 91 FL (ref 82–98)
MONOCYTES # BLD AUTO: 0.2 K/UL (ref 0.3–1)
MONOCYTES NFR BLD: 7.5 % (ref 4–15)
NEUTROPHILS # BLD AUTO: 1.4 K/UL (ref 1.8–7.7)
NEUTROPHILS NFR BLD: 49.1 % (ref 38–73)
NONHDLC SERPL-MCNC: 112 MG/DL
NRBC BLD-RTO: 0 /100 WBC
PLATELET # BLD AUTO: 329 K/UL (ref 150–450)
PMV BLD AUTO: 9.9 FL (ref 9.2–12.9)
POTASSIUM SERPL-SCNC: 4.3 MMOL/L (ref 3.5–5.1)
PROT SERPL-MCNC: 7.4 G/DL (ref 6–8.4)
RBC # BLD AUTO: 4.42 M/UL (ref 4–5.4)
SODIUM SERPL-SCNC: 138 MMOL/L (ref 136–145)
T4 FREE SERPL-MCNC: 0.8 NG/DL (ref 0.71–1.51)
TRIGL SERPL-MCNC: 64 MG/DL (ref 30–150)
TSH SERPL DL<=0.005 MIU/L-ACNC: 0.46 UIU/ML (ref 0.4–4)
WBC # BLD AUTO: 2.81 K/UL (ref 3.9–12.7)

## 2023-01-11 PROCEDURE — 80053 COMPREHEN METABOLIC PANEL: CPT

## 2023-01-11 PROCEDURE — 84443 ASSAY THYROID STIM HORMONE: CPT

## 2023-01-11 PROCEDURE — 84439 ASSAY OF FREE THYROXINE: CPT

## 2023-01-11 PROCEDURE — 85025 COMPLETE CBC W/AUTO DIFF WBC: CPT

## 2023-01-11 PROCEDURE — 80061 LIPID PANEL: CPT

## 2023-01-11 PROCEDURE — 83036 HEMOGLOBIN GLYCOSYLATED A1C: CPT

## 2023-01-11 PROCEDURE — 36415 COLL VENOUS BLD VENIPUNCTURE: CPT | Mod: PO

## 2023-01-17 ENCOUNTER — PATIENT MESSAGE (OUTPATIENT)
Dept: NEUROLOGY | Facility: CLINIC | Age: 58
End: 2023-01-17
Payer: COMMERCIAL

## 2023-01-30 ENCOUNTER — INFUSION (OUTPATIENT)
Dept: INFUSION THERAPY | Facility: HOSPITAL | Age: 58
End: 2023-01-30
Attending: PSYCHIATRY & NEUROLOGY
Payer: COMMERCIAL

## 2023-01-30 VITALS
HEIGHT: 64 IN | OXYGEN SATURATION: 98 % | SYSTOLIC BLOOD PRESSURE: 111 MMHG | RESPIRATION RATE: 16 BRPM | WEIGHT: 149.5 LBS | DIASTOLIC BLOOD PRESSURE: 66 MMHG | BODY MASS INDEX: 25.52 KG/M2 | HEART RATE: 61 BPM

## 2023-01-30 DIAGNOSIS — G43.719 INTRACTABLE CHRONIC MIGRAINE WITHOUT AURA AND WITHOUT STATUS MIGRAINOSUS: Primary | ICD-10-CM

## 2023-01-30 PROCEDURE — A4216 STERILE WATER/SALINE, 10 ML: HCPCS | Mod: PN | Performed by: PSYCHIATRY & NEUROLOGY

## 2023-01-30 PROCEDURE — 63600175 PHARM REV CODE 636 W HCPCS: Mod: TB,PN | Performed by: PSYCHIATRY & NEUROLOGY

## 2023-01-30 PROCEDURE — 96365 THER/PROPH/DIAG IV INF INIT: CPT | Mod: PN

## 2023-01-30 PROCEDURE — 25000003 PHARM REV CODE 250: Mod: PN | Performed by: PSYCHIATRY & NEUROLOGY

## 2023-01-30 RX ORDER — SODIUM CHLORIDE 9 MG/ML
INJECTION, SOLUTION INTRAVENOUS ONCE AS NEEDED
Status: CANCELLED | OUTPATIENT
Start: 2023-01-30

## 2023-01-30 RX ORDER — METHYLPREDNISOLONE SOD SUCC 125 MG
125 VIAL (EA) INJECTION ONCE AS NEEDED
Status: DISCONTINUED | OUTPATIENT
Start: 2023-01-30 | End: 2023-01-30 | Stop reason: HOSPADM

## 2023-01-30 RX ORDER — METHYLPREDNISOLONE SOD SUCC 125 MG
125 VIAL (EA) INJECTION ONCE AS NEEDED
Status: CANCELLED | OUTPATIENT
Start: 2023-01-30

## 2023-01-30 RX ORDER — DIPHENHYDRAMINE HYDROCHLORIDE 50 MG/ML
25 INJECTION INTRAMUSCULAR; INTRAVENOUS ONCE AS NEEDED
Status: DISCONTINUED | OUTPATIENT
Start: 2023-01-30 | End: 2023-01-30 | Stop reason: HOSPADM

## 2023-01-30 RX ORDER — ACETAMINOPHEN 500 MG
1000 TABLET ORAL ONCE AS NEEDED
Status: CANCELLED | OUTPATIENT
Start: 2023-01-30

## 2023-01-30 RX ORDER — SODIUM CHLORIDE 0.9 % (FLUSH) 0.9 %
10 SYRINGE (ML) INJECTION
Status: CANCELLED | OUTPATIENT
Start: 2023-01-30

## 2023-01-30 RX ORDER — SODIUM CHLORIDE 9 MG/ML
INJECTION, SOLUTION INTRAVENOUS ONCE AS NEEDED
Status: DISCONTINUED | OUTPATIENT
Start: 2023-01-30 | End: 2023-01-30 | Stop reason: HOSPADM

## 2023-01-30 RX ORDER — ACETAMINOPHEN 500 MG
1000 TABLET ORAL ONCE AS NEEDED
Status: DISCONTINUED | OUTPATIENT
Start: 2023-01-30 | End: 2023-01-30 | Stop reason: HOSPADM

## 2023-01-30 RX ORDER — DIPHENHYDRAMINE HYDROCHLORIDE 50 MG/ML
25 INJECTION INTRAMUSCULAR; INTRAVENOUS ONCE AS NEEDED
Status: CANCELLED | OUTPATIENT
Start: 2023-01-30

## 2023-01-30 RX ORDER — ONDANSETRON 2 MG/ML
8 INJECTION INTRAMUSCULAR; INTRAVENOUS ONCE AS NEEDED
Status: DISCONTINUED | OUTPATIENT
Start: 2023-01-30 | End: 2023-01-30 | Stop reason: HOSPADM

## 2023-01-30 RX ORDER — SODIUM CHLORIDE 0.9 % (FLUSH) 0.9 %
10 SYRINGE (ML) INJECTION
Status: DISCONTINUED | OUTPATIENT
Start: 2023-01-30 | End: 2023-01-30 | Stop reason: HOSPADM

## 2023-01-30 RX ORDER — ONDANSETRON 2 MG/ML
8 INJECTION INTRAMUSCULAR; INTRAVENOUS ONCE AS NEEDED
Status: CANCELLED | OUTPATIENT
Start: 2023-01-30

## 2023-01-30 RX ADMIN — SODIUM CHLORIDE: 9 INJECTION, SOLUTION INTRAVENOUS at 03:01

## 2023-01-30 RX ADMIN — Medication 10 ML: at 04:01

## 2023-01-30 RX ADMIN — EPTINEZUMAB-JJMR 300 MG: 100 INJECTION INTRAVENOUS at 03:01

## 2023-01-30 NOTE — PLAN OF CARE
Problem: Adult Inpatient Plan of Care  Goal: Patient-Specific Goal (Individualized)  Outcome: Ongoing, Progressing  Flowsheets (Taken 1/30/2023 1450)  Anxieties, Fears or Concerns: first dose of vyepti  Individualized Care Needs: caseyr, pillow x2, education regarding medication     Problem: Fatigue  Goal: Improved Activity Tolerance  Intervention: Promote Improved Energy  Flowsheets (Taken 1/30/2023 1450)  Fatigue Management: frequent rest breaks encouraged  Sleep/Rest Enhancement:   natural light exposure provided   noise level reduced   room darkened  Activity Management:   Ambulated -L4   Ambulated in [place]man - L4     First dose of vyepti. Consent signed at chairside, to be sent to Dr Kowalski for signature.  Education on side effects of medication.

## 2023-01-30 NOTE — PLAN OF CARE
Problem: Adult Inpatient Plan of Care  Goal: Plan of Care Review  Outcome: Ongoing, Progressing  Flowsheets (Taken 1/30/2023 1605)  Plan of Care Reviewed With: patient     Patient tolerated vyepti treatment well. IV flushed with blood return, saline locked, and removed. AVS provided and reviewed. Ambulates per self. No acute distress noted.

## 2023-03-08 ENCOUNTER — PROCEDURE VISIT (OUTPATIENT)
Dept: NEUROLOGY | Facility: CLINIC | Age: 58
End: 2023-03-08
Payer: COMMERCIAL

## 2023-03-08 VITALS — HEIGHT: 64 IN | BODY MASS INDEX: 25.66 KG/M2

## 2023-03-08 DIAGNOSIS — G43.719 INTRACTABLE CHRONIC MIGRAINE WITHOUT AURA AND WITHOUT STATUS MIGRAINOSUS: Primary | ICD-10-CM

## 2023-03-08 PROCEDURE — 64615 PR CHEMODENERVATION OF MUSCLE FOR CHRONIC MIGRAINE: ICD-10-PCS | Mod: S$GLB,,, | Performed by: PSYCHIATRY & NEUROLOGY

## 2023-03-08 PROCEDURE — 64615 CHEMODENERV MUSC MIGRAINE: CPT | Mod: S$GLB,,, | Performed by: PSYCHIATRY & NEUROLOGY

## 2023-03-08 RX ORDER — ELETRIPTAN HYDROBROMIDE 40 MG/1
TABLET, FILM COATED ORAL
Qty: 12 TABLET | Refills: 11 | Status: SHIPPED | OUTPATIENT
Start: 2023-03-08 | End: 2023-03-12 | Stop reason: SDUPTHER

## 2023-03-08 NOTE — PROCEDURES
ProceduresBOTOX PROCEDURE    PROCEDURE PERFORMED: Botulinum toxin injection (70472)    CLINICAL INDICATION: G43.719    A time out was conducted just before the start of the procedure to verify the correct patient and procedure, procedure location, and all relevant critical information.   The patient meets criteria for chronic headaches according to the ICHD-II, the patient has more than 15 headaches a month out of at least 8 are migraines which last for more than 4 hours a day.    The Botox injections have achieved well over 50%  improvement in the patient's symptoms. Migraines have been reduced at least 7 days per month and the number of cumulative hours suffering with headaches has been reduced at least 100 total hours per month. Today she does have a headache indicating that the Botox has worn off. Frequency of treatment is every 3 months unless no response to the treatments, at which time we will discontinue the injections.      DESCRIPTION OF PROCEDURE: After obtaining informed consent and under aseptic technique, a total of 175 units of botulinum toxin type A were injected in the following muscles: Procerus 5 units,  5 units bilaterally, frontalis 20 units, temporalis 20 units bilaterally, occipitalis 15 units, upper cervical paraspinals 10 units bilaterally, masseters 10 units bilaterally and trapezius 15 units bilaterally. The patient was given a total of 175 units in 31 sites.The patient tolerated the procedure well. There were no complications. The patient was given a prescription for repeat treatment in 3 months.     Unavoidable waste 25 units      Hemanth Kowalski M.D  Medical Director, Headache and Facial Pain  [facility]

## 2023-03-12 ENCOUNTER — PATIENT MESSAGE (OUTPATIENT)
Dept: NEUROLOGY | Facility: CLINIC | Age: 58
End: 2023-03-12
Payer: COMMERCIAL

## 2023-03-12 DIAGNOSIS — G43.719 INTRACTABLE CHRONIC MIGRAINE WITHOUT AURA AND WITHOUT STATUS MIGRAINOSUS: ICD-10-CM

## 2023-03-13 RX ORDER — ELETRIPTAN HYDROBROMIDE 40 MG/1
TABLET, FILM COATED ORAL
Qty: 12 TABLET | Refills: 11 | Status: SHIPPED | OUTPATIENT
Start: 2023-03-13 | End: 2023-07-24 | Stop reason: SDUPTHER

## 2023-03-14 ENCOUNTER — PATIENT MESSAGE (OUTPATIENT)
Dept: NEUROLOGY | Facility: CLINIC | Age: 58
End: 2023-03-14
Payer: COMMERCIAL

## 2023-03-29 ENCOUNTER — DOCUMENTATION ONLY (OUTPATIENT)
Dept: INFUSION THERAPY | Facility: HOSPITAL | Age: 58
End: 2023-03-29
Payer: COMMERCIAL

## 2023-03-29 NOTE — PROGRESS NOTES
4:34 PM    This LCSW met with this pt after receiving a message that she was interested in getting a wig from the Cancer Center. She reported having a note from her dermatologist who told her she could have a wig from this cancer center.    This LCSW explained to the pt that we do not sell wigs and the wigs are donated through the [facility] for cancer patients.  This LCSW provided the pt with a catalog of the [facility] wigs and showed her the wigs here so she could see what they look like..    The pt thanked me for supporting her and explained that she has an auto immune disorder and is losing her hair, so she will most likely order one from the catalog.

## 2023-03-30 ENCOUNTER — DOCUMENTATION ONLY (OUTPATIENT)
Dept: INFUSION THERAPY | Facility: HOSPITAL | Age: 58
End: 2023-03-30

## 2023-04-24 ENCOUNTER — DOCUMENTATION ONLY (OUTPATIENT)
Dept: REHABILITATION | Facility: HOSPITAL | Age: 58
End: 2023-04-24
Payer: COMMERCIAL

## 2023-04-24 NOTE — PROGRESS NOTES
[facility]  Physical Therapy Discharge Note    [id number]    Therapy Diagnosis:        Encounter Diagnoses   Name Primary?    Plantar fasciitis of right foot Yes    Plantar fasciitis of left foot        Physician: Rosalio Campuzano DPM     Visit Date: 6/13/2022     Physician Orders: PT Eval and Treat   Medical Diagnosis from Referral:   M72.2 (ICD-10-CM) - Plantar fasciitis of right foot   M72.2 (ICD-10-CM) - Plantar fasciitis of left foot         Evaluation Date: 4/22/2022  Authorization Period Expiration: 12/31/2022  Plan of Care Expiration: 07/22/2022  Progress Note Due: 06/23/2022  Visit # / Visits authorized: 13/ 20   FOTO: Completed on 05/19/2022     Precautions: Standard    Date of Last visit: 06/13/2022    ASSESSMENT      Discharge reason: Patient has not attended therapy since 06/13/2022    Goals: Goals:   Short Term Goals: 4 weeks   Patient will be independent with HEP for symptom management  Patient will increase range of motion >5 deg in BLE in all affected planes to improve functional mobility  Patient will increase strength of BLE by at least 1/2 grade via MMT testing to allow for improved performance of ADLs and daily functional tasks  Patient will be able to walk >20 min  Patient will return to swimming with <2-3/10 pain     Long Term Goals: 12 weeks   Patient will be independent with progressive HEP for continued strengthening to support return to previous level of function  Patient will have grossly symmetrical, pain-free range of motion for improved functional mobility  Patient will increase strength of BLE by at least 1 grade via MMT testing to allow for improved performance of ADLs and daily functional tasks  Patient will be able to stand >60 min  Patient will be able to walk >45 min  Patient will achieve <20% limitation as measured by the FOTO to demonstrate decreased functional disability    PLAN   This patient is discharged  from physical therapy      Luis Black, PT

## 2023-04-28 ENCOUNTER — INFUSION (OUTPATIENT)
Dept: INFUSION THERAPY | Facility: HOSPITAL | Age: 58
End: 2023-04-28
Attending: PSYCHIATRY & NEUROLOGY
Payer: COMMERCIAL

## 2023-04-28 VITALS
HEIGHT: 69 IN | DIASTOLIC BLOOD PRESSURE: 81 MMHG | RESPIRATION RATE: 16 BRPM | TEMPERATURE: 98 F | BODY MASS INDEX: 22.85 KG/M2 | SYSTOLIC BLOOD PRESSURE: 131 MMHG | WEIGHT: 154.31 LBS | HEART RATE: 89 BPM

## 2023-04-28 DIAGNOSIS — G43.719 INTRACTABLE CHRONIC MIGRAINE WITHOUT AURA AND WITHOUT STATUS MIGRAINOSUS: Primary | ICD-10-CM

## 2023-04-28 PROCEDURE — 96365 THER/PROPH/DIAG IV INF INIT: CPT | Mod: PN

## 2023-04-28 PROCEDURE — 63600175 PHARM REV CODE 636 W HCPCS: Mod: JZ,TB,PN | Performed by: PSYCHIATRY & NEUROLOGY

## 2023-04-28 PROCEDURE — 25000003 PHARM REV CODE 250: Mod: PN | Performed by: PSYCHIATRY & NEUROLOGY

## 2023-04-28 PROCEDURE — A4216 STERILE WATER/SALINE, 10 ML: HCPCS | Mod: PN | Performed by: PSYCHIATRY & NEUROLOGY

## 2023-04-28 RX ORDER — ONDANSETRON 2 MG/ML
8 INJECTION INTRAMUSCULAR; INTRAVENOUS ONCE AS NEEDED
Status: DISCONTINUED | OUTPATIENT
Start: 2023-04-28 | End: 2023-04-28 | Stop reason: HOSPADM

## 2023-04-28 RX ORDER — SODIUM CHLORIDE 0.9 % (FLUSH) 0.9 %
10 SYRINGE (ML) INJECTION
Status: CANCELLED | OUTPATIENT
Start: 2023-04-28

## 2023-04-28 RX ORDER — ACETAMINOPHEN 500 MG
1000 TABLET ORAL ONCE AS NEEDED
Status: CANCELLED | OUTPATIENT
Start: 2023-04-28

## 2023-04-28 RX ORDER — DIPHENHYDRAMINE HYDROCHLORIDE 50 MG/ML
25 INJECTION INTRAMUSCULAR; INTRAVENOUS ONCE AS NEEDED
Status: CANCELLED | OUTPATIENT
Start: 2023-04-28

## 2023-04-28 RX ORDER — ACETAMINOPHEN 500 MG
1000 TABLET ORAL ONCE AS NEEDED
Status: DISCONTINUED | OUTPATIENT
Start: 2023-04-28 | End: 2023-04-28 | Stop reason: HOSPADM

## 2023-04-28 RX ORDER — SODIUM CHLORIDE 0.9 % (FLUSH) 0.9 %
10 SYRINGE (ML) INJECTION
Status: DISCONTINUED | OUTPATIENT
Start: 2023-04-28 | End: 2023-04-28 | Stop reason: HOSPADM

## 2023-04-28 RX ORDER — DIPHENHYDRAMINE HYDROCHLORIDE 50 MG/ML
25 INJECTION INTRAMUSCULAR; INTRAVENOUS ONCE AS NEEDED
Status: DISCONTINUED | OUTPATIENT
Start: 2023-04-28 | End: 2023-04-28 | Stop reason: HOSPADM

## 2023-04-28 RX ORDER — SODIUM CHLORIDE 9 MG/ML
INJECTION, SOLUTION INTRAVENOUS ONCE AS NEEDED
Status: CANCELLED | OUTPATIENT
Start: 2023-04-28

## 2023-04-28 RX ORDER — METHYLPREDNISOLONE SOD SUCC 125 MG
125 VIAL (EA) INJECTION ONCE AS NEEDED
Status: CANCELLED | OUTPATIENT
Start: 2023-04-28

## 2023-04-28 RX ORDER — ONDANSETRON 2 MG/ML
8 INJECTION INTRAMUSCULAR; INTRAVENOUS ONCE AS NEEDED
Status: CANCELLED | OUTPATIENT
Start: 2023-04-28

## 2023-04-28 RX ORDER — METHYLPREDNISOLONE SOD SUCC 125 MG
125 VIAL (EA) INJECTION ONCE AS NEEDED
Status: DISCONTINUED | OUTPATIENT
Start: 2023-04-28 | End: 2023-04-28 | Stop reason: HOSPADM

## 2023-04-28 RX ADMIN — EPTINEZUMAB-JJMR 300 MG: 100 INJECTION INTRAVENOUS at 02:04

## 2023-04-28 RX ADMIN — Medication 10 ML: at 02:04

## 2023-04-28 RX ADMIN — SODIUM CHLORIDE: 9 INJECTION, SOLUTION INTRAVENOUS at 02:04

## 2023-04-28 NOTE — PLAN OF CARE
Problem: Fatigue  Goal: Improved Activity Tolerance  Intervention: Promote Improved Energy  Flowsheets (Taken 4/28/2023 1435)  Fatigue Management:   activity schedule adjusted   frequent rest breaks encouraged   paced activity encouraged   fatigue-related activity identified  Sleep/Rest Enhancement:   relaxation techniques promoted   regular sleep/rest pattern promoted   room darkened  Activity Management:   Ambulated -L4   Ambulated in [place] - L4   Up in stretcher chair - L1     Problem: Adult Inpatient Plan of Care  Goal: Patient-Specific Goal (Individualized)  Outcome: Ongoing, Progressing  Flowsheets (Taken 4/28/2023 1435)  Anxieties, Fears or Concerns: none  Individualized Care Needs: recilner, pillow x2, warm blanket, conversation     Problem: Adult Inpatient Plan of Care  Goal: Plan of Care Review  Outcome: Ongoing, Progressing  Flowsheets (Taken 4/28/2023 1535)  Plan of Care Reviewed With: patient   Pt tolerated her Vyepti infusion well, NAD. No new c/o voiced. Pt given a schedule and reviewed, pt verbalized understanding. Pt ambulated out of the clinic without difficulty.

## 2023-05-12 ENCOUNTER — CLINICAL SUPPORT (OUTPATIENT)
Dept: REHABILITATION | Facility: HOSPITAL | Age: 58
End: 2023-05-12
Payer: COMMERCIAL

## 2023-05-12 DIAGNOSIS — M75.02 ADHESIVE CAPSULITIS OF LEFT SHOULDER: ICD-10-CM

## 2023-05-12 DIAGNOSIS — R29.898 WEAKNESS OF UPPER EXTREMITY: ICD-10-CM

## 2023-05-12 DIAGNOSIS — M25.619 DECREASED RANGE OF MOTION OF SHOULDER, UNSPECIFIED LATERALITY: ICD-10-CM

## 2023-05-12 PROCEDURE — 97161 PT EVAL LOW COMPLEX 20 MIN: CPT | Mod: PO

## 2023-05-12 PROCEDURE — 97110 THERAPEUTIC EXERCISES: CPT | Mod: PO

## 2023-05-12 NOTE — PLAN OF CARE
"[facility]  Physical Therapy Initial Evaluation    Date: 5/12/2023   Name: Mayra Smart  [facility] Number: [id number]    Therapy Diagnosis:   Encounter Diagnosis   Name Primary?    Adhesive capsulitis of left shoulder      Physician: Nima Choe MD    Physician Orders: PT Eval and Treat   Medical Diagnosis from Referral: Adhesive capsulitis of left shoulder  Evaluation Date: 5/12/2023  Authorization Period Expiration: 12/31/2023  Plan of Care Expiration: 8/12/2023  Progress Note Due: 6/12/2023  Visit # / Visits authorized: 1/ 1   FOTO: 1/3    Precautions: Standard     Time In: 1500  Time Out: 1540  Total Appointment Time (timed & untimed codes): 40 minutes      SUBJECTIVE   Date of onset: couple months ago ( 2 months)    History of current condition - Mayra reports: a couple months she noticed pain in her left shoulder. She lifts a lot for her job and noticed while she was lifting a box of fish and began to notice pain. Reports pain in bilateral shoulders. Unable to do butterfly stroke in pool. Unable to left what she normally lifts (even a phone). Had shots in both shoulders last Thursday which has gain ROM but her pain is still there. Pain keeps her from moving through greater ranges of motions. Wakes her up out of a dead sleeps. Sleeping around 6 hrs. Pain down left arm but proximal to elbow.    Falls: 0    Imaging, x-ray: bilateral shoulders      Impression:     No acute osseous abnormality     Mild degenerative changes of the acromioclavicular joint.    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[employer]   Prior Level of Function: no limitation at work   Current Level of Function: can't lift ~2lbs without pain    Pain:  Current 0/10, worst 7/10, best 0/10   Location: bilateral  shoulders, left greater than right   Description: "catches"  Aggravating Factors: Lifting  Easing Factors: heating pad and hot bath    Patients " "goals: To be able to work and lift without pain      Medical History:   Past Medical History:   Diagnosis Date    Back pain     Bell's palsy     Breast disorder     fibercystic tissue    Fibroids     uterine    GERD (gastroesophageal reflux disease)     Headache     History of anesthesia reaction     sensitive to anesthesia, " Major hangover"    Neck pain     Pelvic pain in female     left lower quadrant    Preeclampsia        Surgical History:   Mayra Smart  has a past surgical history that includes  section; Endometrial ablation (10/22/2008); Dilation and curettage of uterus (2007); and Augmentation of breast (Left).    Medications:   Mayra has a current medication list which includes the following prescription(s): atorvastatin, budesonide-formoterol 160-4.5 mcg, ciprofloxacin hcl, eletriptan, epinephrine, reyvow, meloxicam, metronidazole, minivelle, minoxidil, progesterone, promethazine, spironolactone, thyroid (pork), and ventolin hfa, and the following Facility-Administered Medications: onabotulinumtoxina.    Allergies:   Review of patient's allergies indicates:   Allergen Reactions    Cephalosporins Anaphylaxis    Penicillins Anaphylaxis    Codeine Itching and Swelling    Opioids - morphine analogues      Vertigo, nausea, itchy throat, tongue enlgmt, rash    Vicoprofen [hydrocodone-ibuprofen] Other (See Comments)     Severe nausea        Objective     Observation: UT tightness     Posture: unremarkable     Active Range of Motion: Measured in degrees      Shoulder Left Right   Flexion 105 125   Abduction 95 120   Flexion + ER Unable to achieve full flexion T1   Extension + IR Top of buttock  L3         Passive Range of Motion: Measured in degrees      Shoulder Left Right   Flexion 145 P! 180   Abduction 110 P! 180   ER at 0 90 90   IR 90 90       Upper Extremity Strength  Elbow Left Right   Forearm pronation 4-/5 4/5   Forearm supination 4-/5 4/5   Biceps 4/5 4+/5   Triceps 4/5 4+/5 "       Shoulder Left Right   Shoulder flexion: 3/5 4-/5   Shoulder Abduction: 3+/5 4-/5   Shoulder ER 4-/5 4-/5   Shoulder IR 4-/5 4-/5   Lower Trap 3/5 3/5   Middle Trap 3/5 3/5   Rhomboids 3/5 3/5     Special Tests:     Impingement Left Right   Neer Positive Negative   Empty Can Test Positive Positive   Hodge Alex Positive Positive   Painful Arc Sign Positive Positive   Painful ER MMT Positive Positive     RTC Pathology Left Right   Drop Arm Positive Positive   ER Lag Sign Negative Negative   Empty Can Test Positive Positive   Subscapularis Lift Off Negative Negative         Scapular Control/Dyskinesis:    Normal / Subtle / Obvious  Comments    Left  Obvious  Medial border winging at rest     Right  Subtle  None          Palpation Shoulder:  TTP supraspinatus insertion, posterior cuff, and deltoid          CMS Impairment/Limitation/Restriction for FOTO shoulder Survey    Therapist reviewed FOTO scores for Mayra Smart on 5/12/2023.   FOTO documents entered into EPIC - see Media section.    Limitation Score: 55%  Category: Mobility         TREATMENT   Treatment Time In: 1530  Treatment Time Out: 1540  Total Treatment time separate from Evaluation: 10 minutes    Mayra received therapeutic exercises to develop strength, ROM, flexibility, and posture for 10 minutes including:  HEP review-see instruction      Home Exercises and Patient Education Provided    Education provided:   - Role of PT, PT POC    Written Home Exercises Provided: yes.  Exercises were reviewed and Mayra was able to demonstrate them prior to the end of the session.  Mayra demonstrated good  understanding of the education provided.     See EMR under Patient Instructions for exercises provided 5/12/2023.    Assessment   Mayra is a 57 y.o. female referred to outpatient Physical Therapy with a medical diagnosis of Adhesive capsulitis of left shoulder. Physical exam is consistent with possible RTC tendinopathy secondary to scapular  dyskinesia. Primary impairments include AROM, PROM, joint mobility, strength, soft tissue restrictions, and pain which limits functional mobility. This pt is a good candidate for skilled PT tx and stands to benefit from a combination of manual therapy including joint mobilizations with trigger point/myofacscial release, therapeutic exercise to establish core/joint stability, neuromuscular re-education, dry needling, and modalities Prn. The pt has been educated on their dx/POC and consents to further PT tx.    Pt prognosis is Good.   Pt will benefit from skilled outpatient Physical Therapy to address the deficits stated above and in the chart below, provide pt/family education, and to maximize pt's level of independence.     Plan of care discussed with patient: Yes  Pt's spiritual, cultural and educational needs considered and patient is agreeable to the plan of care and goals as stated below:     Anticipated Barriers for therapy: co-morbidities     Medical Necessity is demonstrated by the following  History  Co-morbidities and personal factors that may impact the plan of care Co-morbidities:   See above    Personal Factors:   no deficits     low   Examination  Body Structures and Functions, activity limitations and participation restrictions that may impact the plan of care Body Regions:   head  neck  upper extremities    Body Systems:    gross symmetry  ROM  strength  gross coordinated movement  transitions  motor control  motor learning    Participation Restrictions:   Work demands    Activity limitations:   Learning and applying knowledge  no deficits    General Tasks and Commands  no deficits    Communication  no deficits    Mobility  lifting and carrying objects  driving (bike, car, motorcycle)    Self care  washing oneself (bathing, drying, washing hands)  caring for body parts (brushing teeth, shaving, grooming)  toileting  dressing    Domestic Life  shopping  cooking  doing house work (cleaning house,  washing dishes, laundry)    Interactions/Relationships  no deficits    Life Areas  no deficits    Community and Social Life  community life  recreation and leisure         low   Clinical Presentation stable and uncomplicated low   Decision Making/ Complexity Score: moderate     Goals:  Short Term Goals (2-4 Weeks):   1. Pt to increase strength to at least 4/5 of muscles tested to allow for improvement in functional activities and remediate dysfunctional movement patterns.  2. Pt to improve gross shoulder motion by 25% to allow for improved functional mobility.     Long Term Goals (8-10 Weeks):   1. Pt to achieve <36% disability as measured by the FOTO to demonstrate decreased disability with functional activities.   2. Pt to increase strength to at least 5/5 of muscles tested to allow for improvement in functional activities.  3. Pt to improve gross shoulder motion to <10% limitations compared to contralateral shoulder to allow for improved functional mobility with performing ADL's   4. Pt to report compliance with HEP 3x/week and demonstrate proper exercise technique to PT to show independence with self management of condition.      PLAN   Plan of care Certification: 5/12/2023 to 8/12/2023.    Outpatient Physical Therapy 2 times weekly for 10 weeks to include the following interventions: Electrical Stimulation prn, Manual Therapy, Moist Heat/ Ice, Neuromuscular Re-ed, Patient Education, Self Care, Therapeutic Activities, Therapeutic Exercise, and Dry Needling .     Brian Moses, PT      I CERTIFY THE NEED FOR THESE SERVICES FURNISHED UNDER THIS PLAN OF TREATMENT AND WHILE UNDER MY CARE   Physician's comments:     Physician's Signature: ___________________________________________________

## 2023-05-30 ENCOUNTER — CLINICAL SUPPORT (OUTPATIENT)
Dept: REHABILITATION | Facility: HOSPITAL | Age: 58
End: 2023-05-30
Payer: COMMERCIAL

## 2023-05-30 DIAGNOSIS — R29.898 WEAKNESS OF UPPER EXTREMITY: ICD-10-CM

## 2023-05-30 DIAGNOSIS — M25.619 DECREASED RANGE OF MOTION OF SHOULDER, UNSPECIFIED LATERALITY: Primary | ICD-10-CM

## 2023-05-30 PROCEDURE — 97110 THERAPEUTIC EXERCISES: CPT | Mod: PO

## 2023-05-30 PROCEDURE — 97112 NEUROMUSCULAR REEDUCATION: CPT | Mod: PO

## 2023-05-30 NOTE — PROGRESS NOTES
[facility]   Physical Therapy Treatment Note     [id number]    Therapy Diagnosis:   Encounter Diagnoses   Name Primary?    Decreased range of motion of shoulder, unspecified laterality Yes    Weakness of upper extremity      Physician: Nima Choe MD    Visit Date: 5/30/2023    Physician Orders: PT Eval and Treat   Medical Diagnosis from Referral: Adhesive capsulitis of left shoulder  Evaluation Date: 5/12/2023  Authorization Period Expiration: 12/31/2023  Plan of Care Expiration: 8/12/2023  Progress Note Due: 6/12/2023  Visit # / Visits authorized: 1/20  FOTO: 1/3    PTA Visit #: 0/5       Precautions: Standard     Time In: 1500  Time Out: 1600  Total Billable Time: 60 minutes      SUBJECTIVE     Pt reports: she has been ill with diverticulitis so has not been able to attend sessions.  She was compliant with home exercise program.  Response to previous treatment: TBD  Functional change: TBD    Pain: 3/10  Location: left shoulder      OBJECTIVE     Objective Measures updated at progress report unless specified.     Treatment     Mayra received the treatments listed below:      therapeutic exercises to develop strength, endurance, ROM, flexibility, and posture for 25 minutes including:  T/spine mobility sequence (15 minutes)  -half foam extensions with diaphragmatic breathing   -open book   -quadriped mobility  -prayer stretch     Bilateral UT stretch   Bilateral LS stretch     manual therapy techniques: Joint mobilizations were applied to the: t/spine for 10 minutes, including:  T/spine down glides   Prone T/spine grade 4 mob with deep breathing     neuromuscular re-education activities to improve: Sense, Proprioception, and Posture for 25 minutes. The following activities were included:  Prone T's 3x10  Prone A's 3x10  Prone on elbow cervical retraction 2x15  Supine chin tucks 2x10  Supine no moneys 3x12        Patient Education and Home  Exercises     Home Exercises Provided and Patient Education Provided     Education provided:   - HEP, Plan of Care, Prognosis     Written Home Exercises Provided: yes. Exercises were reviewed and Mayra was able to demonstrate them prior to the end of the session.  Mayra demonstrated good  understanding of the education provided. See EMR under Patient Instructions for exercises provided during therapy sessions    ASSESSMENT     Patient's pain reduced with heavy focus on t/spine mobility. Continue to work towards improving postural awareness and incorporate postural strengthening as tolerated.     Mayra Is progressing well towards her goals.   Pt prognosis is Good.     Pt will continue to benefit from skilled outpatient physical therapy to address the deficits listed in the problem list box on initial evaluation, provide pt/family education and to maximize pt's level of independence in the home and community environment.     Pt's spiritual, cultural and educational needs considered and pt agreeable to plan of care and goals.     Anticipated barriers to physical therapy: none    Goals:  Short Term Goals (2-4 Weeks):   1. Pt to increase strength to at least 4/5 of muscles tested to allow for improvement in functional activities and remediate dysfunctional movement patterns.  2. Pt to improve gross shoulder motion by 25% to allow for improved functional mobility.      Long Term Goals (8-10 Weeks):   1. Pt to achieve <36% disability as measured by the FOTO to demonstrate decreased disability with functional activities.   2. Pt to increase strength to at least 5/5 of muscles tested to allow for improvement in functional activities.  3. Pt to improve gross shoulder motion to <10% limitations compared to contralateral shoulder to allow for improved functional mobility with performing ADL's   4. Pt to report compliance with HEP 3x/week and demonstrate proper exercise technique to PT to show independence with self  management of condition.       PLAN     Continue loading as tolerated     Brian Moses, PT

## 2023-06-01 ENCOUNTER — CLINICAL SUPPORT (OUTPATIENT)
Dept: REHABILITATION | Facility: HOSPITAL | Age: 58
End: 2023-06-01
Payer: COMMERCIAL

## 2023-06-01 DIAGNOSIS — R29.898 WEAKNESS OF UPPER EXTREMITY: ICD-10-CM

## 2023-06-01 DIAGNOSIS — M25.619 DECREASED RANGE OF MOTION OF SHOULDER, UNSPECIFIED LATERALITY: Primary | ICD-10-CM

## 2023-06-01 PROCEDURE — 97110 THERAPEUTIC EXERCISES: CPT | Mod: PO

## 2023-06-01 PROCEDURE — 97140 MANUAL THERAPY 1/> REGIONS: CPT | Mod: PO

## 2023-06-01 PROCEDURE — 97112 NEUROMUSCULAR REEDUCATION: CPT | Mod: PO

## 2023-06-01 NOTE — PROGRESS NOTES
[facility]   Physical Therapy Treatment Note     Name: Mayra Smart  Clinic Number: [id number]    Therapy Diagnosis:   Encounter Diagnoses   Name Primary?    Decreased range of motion of shoulder, unspecified laterality Yes    Weakness of upper extremity        Physician: Nima Choe MD    Visit Date: 6/1/2023    Physician Orders: PT Eval and Treat   Medical Diagnosis from Referral: Adhesive capsulitis of left shoulder  Evaluation Date: 5/12/2023  Authorization Period Expiration: 12/31/2023  Plan of Care Expiration: 8/12/2023  Progress Note Due: 6/12/2023  Visit # / Visits authorized: 1/20  FOTO: 1/3    PTA Visit #: 0/5       Precautions: Standard     Time In: 0905  Time Out: 1000  Total Billable Time: 55 minutes      SUBJECTIVE     Pt reports: less pain in shoulders secondary to T/spine mobility Her chiro was able to get a cavitation for the first time in months.  She was compliant with home exercise program.  Response to previous treatment: TBD  Functional change: TBD    Pain: 3/10  Location: left shoulder      OBJECTIVE     Objective Measures updated at progress report unless specified.     Treatment     Mayra received the treatments listed below:      therapeutic exercises to develop strength, endurance, ROM, flexibility, and posture for 20 minutes including:  T/spine mobility sequence (15 minutes)  -half foam extensions with diaphragmatic breathing   -cat camel  -open book   -quadriped mobility  -prayer stretch     Bilateral UT stretch   Bilateral LS stretch     manual therapy techniques: Joint mobilizations were applied to the: t/spine for 10 minutes, including:  T/spine down glides   Prone T/spine grade 4 mob with deep breathing     neuromuscular re-education activities to improve: Sense, Proprioception, and Posture for 25 minutes. The following activities were included:  Seated chin tucks with OP x15  Sidelying SA punch with manual resistance 5 min ea  YTB walkouts 2x5  IR/ER bilateral   Prone T's 3x10  Prone A's 3x10  Prone on elbow cervical retraction 2x15  Supine chin tucks 2x10  Supine no moneys 3x12        Patient Education and Home Exercises     Home Exercises Provided and Patient Education Provided     Education provided:   - HEP, Plan of Care, Prognosis     Written Home Exercises Provided: yes. Exercises were reviewed and Mayra was able to demonstrate them prior to the end of the session.  Mayra demonstrated good  understanding of the education provided. See EMR under Patient Instructions for exercises provided during therapy sessions    ASSESSMENT     Pain with RTB ER walkouts; had to regress to YTB and 3 reps in each set for relief. Patient overcompensates with the use of UT/Pec during SA punches. Continue to work out minimizing compensations. Continue to work towards improving postural awareness and incorporate postural strengthening as tolerated.     Mayra Is progressing well towards her goals.   Pt prognosis is Good.     Pt will continue to benefit from skilled outpatient physical therapy to address the deficits listed in the problem list box on initial evaluation, provide pt/family education and to maximize pt's level of independence in the home and community environment.     Pt's spiritual, cultural and educational needs considered and pt agreeable to plan of care and goals.     Anticipated barriers to physical therapy: none    Goals:  Short Term Goals (2-4 Weeks):   1. Pt to increase strength to at least 4/5 of muscles tested to allow for improvement in functional activities and remediate dysfunctional movement patterns.  2. Pt to improve gross shoulder motion by 25% to allow for improved functional mobility.      Long Term Goals (8-10 Weeks):   1. Pt to achieve <36% disability as measured by the FOTO to demonstrate decreased disability with functional activities.   2. Pt to increase strength to at least 5/5 of muscles tested to allow for improvement in  functional activities.  3. Pt to improve gross shoulder motion to <10% limitations compared to contralateral shoulder to allow for improved functional mobility with performing ADL's   4. Pt to report compliance with HEP 3x/week and demonstrate proper exercise technique to PT to show independence with self management of condition.       PLAN     Continue loading as tolerated     Brian Moses, PT

## 2023-06-02 ENCOUNTER — HOSPITAL ENCOUNTER (OUTPATIENT)
Dept: RADIOLOGY | Facility: HOSPITAL | Age: 58
Discharge: HOME | End: 2023-06-02
Attending: OBSTETRICS & GYNECOLOGY
Payer: COMMERCIAL

## 2023-06-02 DIAGNOSIS — Z12.31 ENCOUNTER FOR SCREENING MAMMOGRAM FOR MALIGNANT NEOPLASM OF BREAST: ICD-10-CM

## 2023-06-02 PROCEDURE — 77067 SCR MAMMO BI INCL CAD: CPT | Mod: 26,,, | Performed by: RADIOLOGY

## 2023-06-02 PROCEDURE — 77067 MAMMO DIGITAL SCREENING BILAT WITH TOMO: ICD-10-PCS | Mod: 26,,, | Performed by: RADIOLOGY

## 2023-06-02 PROCEDURE — 77063 BREAST TOMOSYNTHESIS BI: CPT | Mod: 26,,, | Performed by: RADIOLOGY

## 2023-06-02 PROCEDURE — 77067 SCR MAMMO BI INCL CAD: CPT | Mod: TC,PO

## 2023-06-02 PROCEDURE — 77063 MAMMO DIGITAL SCREENING BILAT WITH TOMO: ICD-10-PCS | Mod: 26,,, | Performed by: RADIOLOGY

## 2023-06-05 ENCOUNTER — TELEPHONE (OUTPATIENT)
Dept: NEUROLOGY | Facility: CLINIC | Age: 58
End: 2023-06-05

## 2023-06-05 ENCOUNTER — PROCEDURE VISIT (OUTPATIENT)
Dept: NEUROLOGY | Facility: CLINIC | Age: 58
End: 2023-06-05
Payer: COMMERCIAL

## 2023-06-05 VITALS
HEIGHT: 69 IN | SYSTOLIC BLOOD PRESSURE: 136 MMHG | RESPIRATION RATE: 18 BRPM | DIASTOLIC BLOOD PRESSURE: 80 MMHG | HEART RATE: 98 BPM | BODY MASS INDEX: 22.59 KG/M2

## 2023-06-05 DIAGNOSIS — G43.719 INTRACTABLE CHRONIC MIGRAINE WITHOUT AURA AND WITHOUT STATUS MIGRAINOSUS: Primary | ICD-10-CM

## 2023-06-05 PROCEDURE — 64615 PR CHEMODENERVATION OF MUSCLE FOR CHRONIC MIGRAINE: ICD-10-PCS | Mod: S$GLB,,, | Performed by: PSYCHIATRY & NEUROLOGY

## 2023-06-05 PROCEDURE — 64615 CHEMODENERV MUSC MIGRAINE: CPT | Mod: S$GLB,,, | Performed by: PSYCHIATRY & NEUROLOGY

## 2023-06-05 RX ORDER — DIHYDROERGOTAMINE MESYLATE 4 MG/ML
SPRAY, METERED NASAL
Qty: 8 ML | Refills: 11 | Status: SHIPPED | OUTPATIENT
Start: 2023-06-05 | End: 2023-07-24

## 2023-06-05 NOTE — PROCEDURES
ProceduresBOTOX PROCEDURE    PROCEDURE PERFORMED: Botulinum toxin injection (83933)    CLINICAL INDICATION: G43.719    A time out was conducted just before the start of the procedure to verify the correct patient and procedure, procedure location, and all relevant critical information.   The patient meets criteria for chronic headaches according to the ICHD-II, the patient has more than 15 headaches a month out of at least 8 are migraines which last for more than 4 hours a day.    The Botox injections have achieved well over 50%  improvement in the patient's symptoms. Migraines have been reduced at least 7 days per month and the number of cumulative hours suffering with headaches has been reduced at least 100 total hours per month. Today she does have a headache indicating that the Botox has worn off. Frequency of treatment is every 3 months unless no response to the treatments, at which time we will discontinue the injections.      DESCRIPTION OF PROCEDURE: After obtaining informed consent and under aseptic technique, a total of 175 units of botulinum toxin type A were injected in the following muscles: Procerus 5 units,  5 units bilaterally, frontalis 20 units, temporalis 20 units bilaterally, occipitalis 15 units, upper cervical paraspinals 10 units bilaterally, masseters 10 units bilaterally and trapezius 15 units bilaterally. The patient was given a total of 175 units in 31 sites.The patient tolerated the procedure well. There were no complications. The patient was given a prescription for repeat treatment in 3 months.     Unavoidable waste 25 units      Hemanth Kowalski M.D  Medical Director, Headache and Facial Pain  [facility]

## 2023-06-05 NOTE — TELEPHONE ENCOUNTER
NISHANT PA:   Prior authorization approved Case ID: [id number]      Payer:  [employer] Search Patient's Payer    [phone]    CaseId:[id number];Status:Approved;Review Type:Prior Auth;Coverage Start Date:06/05/2023;Coverage End Date:06/04/2024;   Approval Details    Authorized from June 5, 2023 to June 4, 2024

## 2023-06-06 ENCOUNTER — CLINICAL SUPPORT (OUTPATIENT)
Dept: REHABILITATION | Facility: HOSPITAL | Age: 58
End: 2023-06-06
Payer: COMMERCIAL

## 2023-06-06 DIAGNOSIS — R29.898 WEAKNESS OF UPPER EXTREMITY: ICD-10-CM

## 2023-06-06 DIAGNOSIS — M25.619 DECREASED RANGE OF MOTION OF SHOULDER, UNSPECIFIED LATERALITY: Primary | ICD-10-CM

## 2023-06-06 PROCEDURE — 97112 NEUROMUSCULAR REEDUCATION: CPT | Mod: PO

## 2023-06-06 PROCEDURE — 97110 THERAPEUTIC EXERCISES: CPT | Mod: PO

## 2023-06-06 PROCEDURE — 97140 MANUAL THERAPY 1/> REGIONS: CPT | Mod: PO

## 2023-06-06 NOTE — PROGRESS NOTES
[facility]   Physical Therapy Treatment Note     Name: Mayra Smart  Clinic Number: [id number]    Therapy Diagnosis:   Encounter Diagnoses   Name Primary?    Decreased range of motion of shoulder, unspecified laterality Yes    Weakness of upper extremity          Physician: Nima Choe MD    Visit Date: 6/6/2023    Physician Orders: PT Eval and Treat   Medical Diagnosis from Referral: Adhesive capsulitis of left shoulder  Evaluation Date: 5/12/2023  Authorization Period Expiration: 12/31/2023  Plan of Care Expiration: 8/12/2023  Progress Note Due: 6/12/2023  Visit # / Visits authorized: 1/20  FOTO: 1/3    PTA Visit #: 0/5       Precautions: Standard     Time In: 0900  Time Out: 0955  Total Billable Time: 55 minutes      SUBJECTIVE     Pt reports: burning pain in left intrascap region. Anterior left shoulder pain improved  She was compliant with home exercise program.  Response to previous treatment: sore  Functional change: TBD    Pain: 4/10 left intrascap  Location: left shoulder      OBJECTIVE     *sign left t/spine rotation-4/10 pain  Improved following manual    Treatment     Mayra received the treatments listed below:      therapeutic exercises to develop strength, endurance, ROM, flexibility, and posture for 20 minutes including:  Assessment  T/spine mobility sequence (15 minutes)  -half foam extensions with diaphragmatic breathing   -cat camel  -open book   -prayer stretch     Bilateral UT stretch   Bilateral LS stretch     manual therapy techniques: Joint mobilizations were applied to the: t/spine for 10 minutes, including:  T/spine down glides   Prone T/spine grade 4 mob with deep breathing     neuromuscular re-education activities to improve: Sense, Proprioception, and Posture for 25 minutes. The following activities were included:  Seated chin tucks with OP x15  Sidelying SA punch with manual resistance 5 min ea  YTB walkouts 2x5 IR/ER bilateral   Prone T's  3x10  Prone A's 3x10  Prone on elbow cervical retraction 2x15  Supine chin tucks 2x10  Supine no moneys 3x12        Patient Education and Home Exercises     Home Exercises Provided and Patient Education Provided     Education provided:   - HEP, Plan of Care, Prognosis     Written Home Exercises Provided: yes. Exercises were reviewed and Mayra was able to demonstrate them prior to the end of the session.  Mayra demonstrated good  understanding of the education provided. See EMR under Patient Instructions for exercises provided during therapy sessions    ASSESSMENT     4/10 burning pain at left intrascap decreased to 2/10 with today's intervention. Right UT compensation noted during postural stability exercise. Continue to work towards improving postural awareness and incorporate postural strengthening as tolerated.     Mayra Is progressing well towards her goals.   Pt prognosis is Good.     Pt will continue to benefit from skilled outpatient physical therapy to address the deficits listed in the problem list box on initial evaluation, provide pt/family education and to maximize pt's level of independence in the home and community environment.     Pt's spiritual, cultural and educational needs considered and pt agreeable to plan of care and goals.     Anticipated barriers to physical therapy: none    Goals:  Short Term Goals (2-4 Weeks):   1. Pt to increase strength to at least 4/5 of muscles tested to allow for improvement in functional activities and remediate dysfunctional movement patterns.  2. Pt to improve gross shoulder motion by 25% to allow for improved functional mobility.      Long Term Goals (8-10 Weeks):   1. Pt to achieve <36% disability as measured by the FOTO to demonstrate decreased disability with functional activities.   2. Pt to increase strength to at least 5/5 of muscles tested to allow for improvement in functional activities.  3. Pt to improve gross shoulder motion to <10% limitations  compared to contralateral shoulder to allow for improved functional mobility with performing ADL's   4. Pt to report compliance with HEP 3x/week and demonstrate proper exercise technique to PT to show independence with self management of condition.       PLAN     Continue loading as tolerated     Brian Moses, PT

## 2023-06-12 ENCOUNTER — PATIENT MESSAGE (OUTPATIENT)
Dept: NEUROLOGY | Facility: CLINIC | Age: 58
End: 2023-06-12
Payer: COMMERCIAL

## 2023-06-13 ENCOUNTER — CLINICAL SUPPORT (OUTPATIENT)
Dept: REHABILITATION | Facility: HOSPITAL | Age: 58
End: 2023-06-13
Payer: COMMERCIAL

## 2023-06-13 DIAGNOSIS — R29.898 WEAKNESS OF UPPER EXTREMITY: ICD-10-CM

## 2023-06-13 DIAGNOSIS — M25.619 DECREASED RANGE OF MOTION OF SHOULDER, UNSPECIFIED LATERALITY: Primary | ICD-10-CM

## 2023-06-13 PROCEDURE — 97140 MANUAL THERAPY 1/> REGIONS: CPT | Mod: PO

## 2023-06-13 PROCEDURE — 97112 NEUROMUSCULAR REEDUCATION: CPT | Mod: PO

## 2023-06-13 PROCEDURE — 97110 THERAPEUTIC EXERCISES: CPT | Mod: PO

## 2023-06-13 NOTE — PROGRESS NOTES
[facility]   Physical Therapy Treatment Note     Name: Mayra Smart  [facility] Number: [id number]    Therapy Diagnosis:   Encounter Diagnoses   Name Primary?    Decreased range of motion of shoulder, unspecified laterality Yes    Weakness of upper extremity          Physician: Nima Choe MD    Visit Date: 6/13/2023    Physician Orders: PT Eval and Treat   Medical Diagnosis from Referral: Adhesive capsulitis of left shoulder  Evaluation Date: 5/12/2023  Authorization Period Expiration: 12/31/2023  Plan of Care Expiration: 8/12/2023  Progress Note Due: 7/12/2023  Visit # / Visits authorized: 4/20  FOTO: 1/3    PTA Visit #: 0/5       Precautions: Standard     Time In: 0905  Time Out: 1000  Total Billable Time: 55 minutes      SUBJECTIVE     Pt reports: whatever we did last time worked. Patient going on vacation next week.    She was compliant with home exercise program.  Response to previous treatment: TBD  Functional change: TBD    Pain: 5/10  Location: left shoulder      OBJECTIVE     Objective Measures updated at progress report unless specified.     Treatment     Mayra received the treatments listed below:      therapeutic exercises to develop strength, endurance, ROM, flexibility, and posture for 15 minutes including:  T/spine mobility sequence following manual  (12 minutes)  -half foam extensions with diaphragmatic breathing   -cat camel  -open book   -quadriped mobility  -prayer stretch     Bilateral UT stretch   Bilateral LS stretch     manual therapy techniques: Joint mobilizations were applied to the: t/spine for 15 minutes, including:  T/spine down glides   Prone T/spine grade 4 mob with deep breathing         neuromuscular re-education activities to improve: Sense, Proprioception, and Posture for 25 minutes. The following activities were included:  Following manual   Posture neutral upper quarter 3x1 min  Scap retraction and depression with OP x20  Seated RTB low row ss  w/ RTB No money 3x12   Supine chin tucks w/ manual cervical traction 2x10      held  Seated chin tucks with OP x15  Sidelying SA punch with manual resistance 5 min ea  YTB walkouts 2x5 IR/ER bilateral   Prone T's 3x10  Prone A's 3x10  Prone on elbow cervical retraction 2x15    Patient Education and Home Exercises     Home Exercises Provided and Patient Education Provided     Education provided:   - HEP, Plan of Care, Prognosis     Written Home Exercises Provided: yes. Exercises were reviewed and Mayra was able to demonstrate them prior to the end of the session.  Mayra demonstrated good  understanding of the education provided. See EMR under Patient Instructions for exercises provided during therapy sessions    ASSESSMENT   Significant improvement noted in overall presentation and pain since initial visit. Patient presents with 5/10 discogenic ~T4/5 left burning pain that is abolished with thoracic mobility into extension. Subjective and objectively noted gain in spinal mobility (P-A) in mid thoracic spine. Continue to work towards improving postural awareness and incorporate postural strengthening as tolerated. Prescribed segmental ext to HEP.     Mayra Is progressing well towards her goals.   Pt prognosis is Good.     Pt will continue to benefit from skilled outpatient physical therapy to address the deficits listed in the problem list box on initial evaluation, provide pt/family education and to maximize pt's level of independence in the home and community environment.     Pt's spiritual, cultural and educational needs considered and pt agreeable to plan of care and goals.     Anticipated barriers to physical therapy: none    Goals:  Short Term Goals (2-4 Weeks):   1. Pt to increase strength to at least 4/5 of muscles tested to allow for improvement in functional activities and remediate dysfunctional movement patterns.  2. Pt to improve gross shoulder motion by 25% to allow for improved functional  mobility.      Long Term Goals (8-10 Weeks):   1. Pt to achieve <36% disability as measured by the FOTO to demonstrate decreased disability with functional activities.   2. Pt to increase strength to at least 5/5 of muscles tested to allow for improvement in functional activities.  3. Pt to improve gross shoulder motion to <10% limitations compared to contralateral shoulder to allow for improved functional mobility with performing ADL's   4. Pt to report compliance with HEP 3x/week and demonstrate proper exercise technique to PT to show independence with self management of condition.       PLAN     Continue loading as tolerated     Brian Moses, PT

## 2023-06-16 ENCOUNTER — CLINICAL SUPPORT (OUTPATIENT)
Dept: REHABILITATION | Facility: HOSPITAL | Age: 58
End: 2023-06-16
Payer: COMMERCIAL

## 2023-06-16 DIAGNOSIS — R29.898 WEAKNESS OF UPPER EXTREMITY: ICD-10-CM

## 2023-06-16 DIAGNOSIS — M25.619 DECREASED RANGE OF MOTION OF SHOULDER, UNSPECIFIED LATERALITY: Primary | ICD-10-CM

## 2023-06-16 PROCEDURE — 97140 MANUAL THERAPY 1/> REGIONS: CPT | Mod: PO

## 2023-06-16 PROCEDURE — 97110 THERAPEUTIC EXERCISES: CPT | Mod: PO

## 2023-06-16 PROCEDURE — 97112 NEUROMUSCULAR REEDUCATION: CPT | Mod: PO

## 2023-06-16 NOTE — PROGRESS NOTES
[facility]   Physical Therapy Treatment Note     Name: Mayra Smart  [facility] Number: [id number]    Therapy Diagnosis:   Encounter Diagnoses   Name Primary?    Decreased range of motion of shoulder, unspecified laterality Yes    Weakness of upper extremity          Physician: Nima Choe MD    Visit Date: 6/16/2023    Physician Orders: PT Eval and Treat   Medical Diagnosis from Referral: Adhesive capsulitis of left shoulder  Evaluation Date: 5/12/2023  Authorization Period Expiration: 12/31/2023  Plan of Care Expiration: 8/12/2023  Progress Note Due: 7/12/2023  Visit # / Visits authorized: 5/20  FOTO: 1/3    PTA Visit #: 0/5       Precautions: Standard     Time In: 1255  Time Out: 1350  Total Billable Time: 55 minutes      SUBJECTIVE     Pt reports: intense soreness at hypomobile segments but it slowly getting better.   She was compliant with home exercise program.  Response to previous treatment: TBD  Functional change: TBD    Pain: 5/10  Location: left shoulder      OBJECTIVE     Objective Measures updated at progress report unless specified.     Treatment     Mayra received the treatments listed below:      therapeutic exercises to develop strength, endurance, ROM, flexibility, and posture for 15 minutes including:  T/spine mobility sequence following manual  (12 minutes)  -half foam extensions with diaphragmatic breathing   - pec stretch   -open book   -quadriped mobility  -prayer stretch     Bilateral UT stretch   Bilateral LS stretch     manual therapy techniques: Joint mobilizations were applied to the: t/spine for 15 minutes, including:  T/spine down glides   Prone T/spine grade 4 mob with deep breathing         neuromuscular re-education activities to improve: Sense, Proprioception, and Posture for 25 minutes. The following activities were included:  Following manual   Posture neutral upper quarter 3x1 min  Scap retraction and depression with OP x20  Seated RTB low row  ss w/ RTB No money 3x12   Supine chin tucks w/ manual cervical traction 2x10  30# row machine 2x12     held  Seated chin tucks with OP x15  Sidelying SA punch with manual resistance 5 min ea  YTB walkouts 2x5 IR/ER bilateral   Prone T's 3x10  Prone A's 3x10  Prone on elbow cervical retraction 2x15    Patient Education and Home Exercises     Home Exercises Provided and Patient Education Provided     Education provided:   - HEP, Plan of Care, Prognosis     Written Home Exercises Provided: yes. Exercises were reviewed and Mayra was able to demonstrate them prior to the end of the session.  Mayra demonstrated good  understanding of the education provided. See EMR under Patient Instructions for exercises provided during therapy sessions    ASSESSMENT   Same response to treatment as last session. Patient presents with 5/10 discogenic ~T4/5 left burning pain that is abolished with thoracic mobility into extension at said segment. Subjective and objectively noted gain in spinal mobility (P-A) in mid thoracic spine. Continue to work towards improving postural awareness and incorporate postural strengthening as tolerated. Prescribed segmental ext to HEP.     Mayra Is progressing well towards her goals.   Pt prognosis is Good.     Pt will continue to benefit from skilled outpatient physical therapy to address the deficits listed in the problem list box on initial evaluation, provide pt/family education and to maximize pt's level of independence in the home and community environment.     Pt's spiritual, cultural and educational needs considered and pt agreeable to plan of care and goals.     Anticipated barriers to physical therapy: none    Goals:  Short Term Goals (2-4 Weeks):   1. Pt to increase strength to at least 4/5 of muscles tested to allow for improvement in functional activities and remediate dysfunctional movement patterns.  2. Pt to improve gross shoulder motion by 25% to allow for improved functional  mobility.      Long Term Goals (8-10 Weeks):   1. Pt to achieve <36% disability as measured by the FOTO to demonstrate decreased disability with functional activities.   2. Pt to increase strength to at least 5/5 of muscles tested to allow for improvement in functional activities.  3. Pt to improve gross shoulder motion to <10% limitations compared to contralateral shoulder to allow for improved functional mobility with performing ADL's   4. Pt to report compliance with HEP 3x/week and demonstrate proper exercise technique to PT to show independence with self management of condition.       PLAN     Continue loading as tolerated     Brian Moses, PT

## 2023-07-24 ENCOUNTER — OFFICE VISIT (OUTPATIENT)
Dept: NEUROLOGY | Facility: CLINIC | Age: 58
End: 2023-07-24
Payer: COMMERCIAL

## 2023-07-24 VITALS
BODY MASS INDEX: 24.57 KG/M2 | RESPIRATION RATE: 18 BRPM | WEIGHT: 143.94 LBS | HEIGHT: 64 IN | DIASTOLIC BLOOD PRESSURE: 88 MMHG | HEART RATE: 103 BPM | SYSTOLIC BLOOD PRESSURE: 124 MMHG

## 2023-07-24 DIAGNOSIS — G43.719 INTRACTABLE CHRONIC MIGRAINE WITHOUT AURA AND WITHOUT STATUS MIGRAINOSUS: Primary | ICD-10-CM

## 2023-07-24 DIAGNOSIS — G43.901 STATUS MIGRAINOSUS: ICD-10-CM

## 2023-07-24 DIAGNOSIS — R51.9 FACIAL PAIN: ICD-10-CM

## 2023-07-24 PROCEDURE — 3044F HG A1C LEVEL LT 7.0%: CPT | Mod: CPTII,S$GLB,, | Performed by: PSYCHIATRY & NEUROLOGY

## 2023-07-24 PROCEDURE — 64450 NJX AA&/STRD OTHER PN/BRANCH: CPT | Mod: 50,S$GLB,, | Performed by: PSYCHIATRY & NEUROLOGY

## 2023-07-24 PROCEDURE — 64450 PR NERVE BLOCK INJ, ANES/STEROID, OTHER PERIPHERAL: ICD-10-PCS | Mod: 50,S$GLB,, | Performed by: PSYCHIATRY & NEUROLOGY

## 2023-07-24 PROCEDURE — 3008F BODY MASS INDEX DOCD: CPT | Mod: CPTII,S$GLB,, | Performed by: PSYCHIATRY & NEUROLOGY

## 2023-07-24 PROCEDURE — 99999 PR PBB SHADOW E&M-EST. PATIENT-LVL III: CPT | Mod: PBBFAC,,, | Performed by: PSYCHIATRY & NEUROLOGY

## 2023-07-24 PROCEDURE — 99214 OFFICE O/P EST MOD 30 MIN: CPT | Mod: 25,S$GLB,, | Performed by: PSYCHIATRY & NEUROLOGY

## 2023-07-24 PROCEDURE — 3044F PR MOST RECENT HEMOGLOBIN A1C LEVEL <7.0%: ICD-10-PCS | Mod: CPTII,S$GLB,, | Performed by: PSYCHIATRY & NEUROLOGY

## 2023-07-24 PROCEDURE — 3074F SYST BP LT 130 MM HG: CPT | Mod: CPTII,S$GLB,, | Performed by: PSYCHIATRY & NEUROLOGY

## 2023-07-24 PROCEDURE — 3079F PR MOST RECENT DIASTOLIC BLOOD PRESSURE 80-89 MM HG: ICD-10-PCS | Mod: CPTII,S$GLB,, | Performed by: PSYCHIATRY & NEUROLOGY

## 2023-07-24 PROCEDURE — 3008F PR BODY MASS INDEX (BMI) DOCUMENTED: ICD-10-PCS | Mod: CPTII,S$GLB,, | Performed by: PSYCHIATRY & NEUROLOGY

## 2023-07-24 PROCEDURE — 99999 PR PBB SHADOW E&M-EST. PATIENT-LVL III: ICD-10-PCS | Mod: PBBFAC,,, | Performed by: PSYCHIATRY & NEUROLOGY

## 2023-07-24 PROCEDURE — 99214 PR OFFICE/OUTPT VISIT, EST, LEVL IV, 30-39 MIN: ICD-10-PCS | Mod: 25,S$GLB,, | Performed by: PSYCHIATRY & NEUROLOGY

## 2023-07-24 PROCEDURE — 3079F DIAST BP 80-89 MM HG: CPT | Mod: CPTII,S$GLB,, | Performed by: PSYCHIATRY & NEUROLOGY

## 2023-07-24 PROCEDURE — 3074F PR MOST RECENT SYSTOLIC BLOOD PRESSURE < 130 MM HG: ICD-10-PCS | Mod: CPTII,S$GLB,, | Performed by: PSYCHIATRY & NEUROLOGY

## 2023-07-24 RX ORDER — METAXALONE 800 MG/1
TABLET ORAL
Qty: 30 TABLET | Refills: 11 | Status: SHIPPED | OUTPATIENT
Start: 2023-07-24 | End: 2023-08-11

## 2023-07-24 RX ORDER — ELETRIPTAN HYDROBROMIDE 40 MG/1
TABLET, FILM COATED ORAL
Qty: 12 TABLET | Refills: 11 | Status: SHIPPED | OUTPATIENT
Start: 2023-07-24 | End: 2024-03-02

## 2023-07-24 NOTE — PROGRESS NOTES
Subjective:      Patient ID: Mayra Smart is a 58 y.o. female.    Chief Complaint: Headache    INTERVAL HISTORY 07/24/2023  Patient returns for follow up. Reports waking up with temporal pain in the morning. 5/7 days. 2/7 days she also wakes up with nausea; nausea improved with relpax. Sleeping 7-8 hrs per night; Reports restful sleep. Drinking > 60 oz's of water per day. Scheduled for vyepti next week; notes good response with 1st dose, less response with 2nd dose. Reports maxalt/trudhesa no longer effective.     INTERVAL HISTORY 1/4/2023  The patient presents for follow up. She is still in status migrainosus. This happened when she missed a dose of Ajovy. Unfortunately, even when she restarted it, the headache did not remit. I sent her for IV infusions consecutive days but this was not feasible, not very effective. Nurtec and Ubrelvy not effective. She uses Maxalt, Relpax. Acutely aware of medication overuse headache. She presents to discuss options.    INTERVAL HISTORY 12/6/22  Patient presents for follow up. Last office visit was with me a year and a half ago. She is on Botox with Dr. Kowalski and most recent Botox session was 9/23/22.    Current prevention - Botox, Ajovy (missed last two months)  Current acute - eletriptan, Ubrelvy    Today she reports she is much worse. She reports her Ajovy was discontinued unexpectedly. She states it was not shipped automatically and she is unsure why. She has headaches in the sides of the head, eyes, between eyebrows. Current pain 8 with range 0-10. She has had a daily headache since last Wednesday. Before that she was having 4 migraines per week. Both Ajovy and Ubrelvy are currently at [facility] pending PA. She states she has taken Zofran last night without much improvement. Otherwise information below is reviewed and verified with no changes made     INTERVAL HISTORY 7/29/21  The patient location is: home  The chief complaint leading to consultation is: follow  up  Visit type: audiovisual  Face to Face time with patient: 15  25 minutes of total time spent on the encounter, which includes face to face time and non-face to face time preparing to see the patient (eg, review of tests), Obtaining and/or reviewing separately obtained history, Documenting clinical information in the electronic or other health record, Independently interpreting results (not separately reported) and communicating results to the patient/family/caregiver, or Care coordination (not separately reported).   Each patient to whom he or she provides medical services by telemedicine is:  (1) informed of the relationship between the physician and patient and the respective role of any other health care provider with respect to management of the patient; and (2) notified that he or she may decline to receive medical services by telemedicine and may withdraw from such care at any time.    Notes:     Last office visit with Dr. Kowalski was almost two years ago. She has been on Botox since 9/6/19 with most recent session being 7/23/21. She is also on Emgality.     Today she reports she is a little better. She had taken almost 9 months off of Botox due to insurance. She has had two sessions on schedule again. She reports a daily headache with weekly escalations to migraine. She has several migraine abortives including Ubrelvy, Relpax, Zomig, and Imitrex. She feels nothing is working. She previously tried Nurtec and it was not effective. Current migraine cycle ongoing for past 6 days.     INTERVAL HISTORY 8/26/2019  The patient comes for follow up. She was doing very well on Emgality until end of May when it stopped working, now she is having headaches about 20 days per month. Using acute treatmetn almost every day. Waking up with a headache almost daily. Has never had a sleep study. Steroids in June given by her PCP provided no help. Excedrin approximately 20 per month, also taking Relpax and Advil. Relpax was  "recalled last week (traces of contaminant), Diamox did not help for weather related headaches. Already on Spironolactone. She has a new TMJ splint with undetermined benefit.     HPI   The patient is a pleasant 52 y/o female presenting with chief complaint of headaches. She first developed them around the age of 38 for no apparent reason. She was under the care of a neurologist for a long time. She has tried multiple medications without benefit or poor tolerability as listed below. She has tried different diets, including the "JJ diet" with no help. Hormonal adjustments ineffective as well. Drug holiday for 6 weeks has not helped. Amongst the acute medications, Relpax works the best, she has tried all other triptans, fioricet, fiorinal, and OTC analgesics.  Please see details of headache characteristics below.  Headache questionnaire    1. When did your Headaches start?    15 years ago      2. Where are your headaches located?   Temporal and behind eyes      3. Your headache's characteristics:   Pressure, Throbbing, Pounding, Stabbing, Like a tight band, Sharp      4. How long does the headache last?   days      5. How often does the headache occur?   daily      6. Are your headaches preceded or accompanied by other symptoms? yes   If yes, please describe.  Sometimes nausea      7. Does the headache awaken you at night? yes   If so, how often? 4-5 times a night        8. Please oz the word that best describes your headache's intensity:    severe      9. Using a scale of 1 through 10, with 0 = no pain and 10 = the worst pain:   What score is your headache now? 4   What score is your headache at its worst? 10   What score is your headache at its best? 0        10. Possible associated headache symptoms:  [x]  Sensitivity to light  [x] Dizziness  [] Nasal or sinus pressure/ pain   [x] Sensitivity to noise  [x] Vertigo  [] Problems with concentration  [x] Sensitivity to smells  [] Ringing in ears  [] Problems with " memory    [] Blurred vision  [] Irritability  [] Problems with task completion   [] Double vision  [] Anger  []  Problems with relaxation  [x] Loss of appetite  [] Anxiety  [x] Neck tightness, Neck pain  [x] Nausea   [] Nasal congestion  [x] Vomiting         11. Headache improving factors:  [] Sleep  [] Heat  [] Darkness  [x] Ice  [] Local pressure [] Menses (period)  [] Massage   [x] Medications:        12. Headache worsening factors:   [] Fatigue [] Sneezing  [x] Changes in Weather  [] Light [] Bending Over [] Stress  [] Noise [] Ovulation  [] Multiple Sclerosis Flare-Up  [] Smells  [] Menses  [] Food   [] Coughing [x] Alcohol      13. Number of caffeinated drinks per day: 1      14. Number of diet drinks per day:  0      15. Have you seen any other [facility] within the last 3 years?  No      Please Check any Medications Tried for Headache    AED Neuromodulators  MAOIs  Ergot Alkaloids    Acetazolamide (Diamox) [] Phenelzine (Nardil) [] Dihydroergotamine (Migranal) []   Carbamazepine (Tegretol) [] Tranylcypromine (Parnate) [] Ergotamine (Ergomar) []   Gabapentin (Neurontin) [x] Antihistamine/Serotonergic  Triptans    Lacosamide (Vimpat) [] Cyproheptadine (Periactin) [x] Almotriptan (Axert) [x]   Lamotrigine (Lamictal) [] Antihypertensives  Eletriptan (Relpax) [x]   Levatiracetam (Keppra) [] Atenolol (Tenormin) [] Frovatriptan (Frova) [x]   Oxcarbazepine (Trileptal) [] Bisoprostol (Zebeta) [] Naratriptan (Amerge) []   Phenobarbital [] Candesartan (Atacand) [] Rizatriptan (Maxalt) [x]   Phenytoin (Dilantin) [] Diltiazem (Cardizem) [] Sumatriptan (Imitrex) [x]   Pregabalin (Lyrica) [] Lisinopril (Prinivil, Zestril) [x] Zolmitriptan (Zomig) [x]   Primidone (Mysoline) [] Metoprolol (Toprol) [x] Combo Abortives    Tiagabine (Gabatril) [] Nadolol (Corgard) [x] Butalbital and Acetaminophen (Bupap) []   Topiramate (Topamax)  (Trokendi) [x] Nicardipine (Cardene) []     Vigabatrin (Sabril) [] Nimodipine (Nimotop)  [] Butalbital, Acetaminophen, and caffeine (Fioricet) [x]   Valproic Acid (Depakote) (Divalproex Sodium) [] Propranolol (Inderal) [x]     Zonisamide (Zonegran) [] Telmisartan (Micardis) [] Butalbital, Aspirin, and caffeine (Fiorinal) [x]   Benzodiazepines  Timolol (Blocadren) []     Alprazolam (Xanax) [x] Verapamil (Calan, Verelan) [] Butalbital, Caffeine, Acetaminophen, and Codeine (Fioricet with Codeine) []   Diazepam (Valium) [x] NSAIDs      Lorazepam (Ativan) [x] Acetaminophen (Tylenol) [x]     Clonazepam (Klonopin) [] Acetylsalicylic Acid (Aspirin) [x] Butalbital, Caffeine, Aspirin, and Codeine  (Fiorinal with Codeine) []   Antidepressants  Diclofenac (Cambia) []     Amitriptyline (Elavil) [] Ibuprofen (Motrin) [x]     Desipramine (Norpramin) [] Indomethacin (Indocin) [x] Aspirin, Caffeine, and Acetaminophen (Excedrin) (Goodys) []   Doxepin (Sinequan) [] Ketoprofen (Orudis) [x]     Fluoxetine (Prozac) [] Ketorolac (Toradol) [x] Acetaminophen, Dichloralphenazone, and Isometheptene (Midrin) []   Imipramine (Tofranil) [] Naproxen (Anaprox) (Aleve) [x]     Nortriptyline (Pamelor) [] Meclofenamic Acid (Meclomen) []     Diamox        Venlafaxine (Effexor) [] Meloxicam (Mobic) [] Aspirin, Salicylamide, and Caffeine (BC Powder) [x]     Review of Systems   Constitutional: Positive for fatigue. Negative for activity change, appetite change and fever.   HENT: Negative for congestion, dental problem, hearing loss, sinus pressure, tinnitus, trouble swallowing and voice change.    Eyes: Negative for photophobia, pain, redness and visual disturbance.   Respiratory: Negative for cough, chest tightness and shortness of breath.    Cardiovascular: Negative for chest pain, palpitations and leg swelling.   Gastrointestinal: Negative for abdominal pain, blood in stool, nausea and vomiting.   Endocrine: Positive for cold intolerance and heat intolerance.   Genitourinary: Negative for difficulty urinating, frequency, menstrual problem  "and urgency.   Musculoskeletal: Negative for arthralgias, back pain, gait problem, joint swelling, myalgias, neck pain and neck stiffness.   Skin: Negative.    Neurological: Positive for headaches. Negative for dizziness, tremors, seizures, syncope, facial asymmetry, speech difficulty, weakness, light-headedness and numbness.   Hematological: Negative for adenopathy. Does not bruise/bleed easily.   Psychiatric/Behavioral: Negative for agitation, behavioral problems, confusion, decreased concentration, self-injury, sleep disturbance and suicidal ideas. The patient is not nervous/anxious and is not hyperactive.      Past Medical History:   Diagnosis Date    Back pain     Bell's palsy     Breast disorder     fibercystic tissue    Fibroids     uterine    GERD (gastroesophageal reflux disease)     History of anesthesia reaction     sensitive to anesthesia, " Major hangover"    Neck pain     Pelvic pain in female     left lower quadrant    Preeclampsia      Past Surgical History:   Procedure Laterality Date     SECTION      times 2    CYSTOSCOPY N/A 2012    Performed by Terrance Pereira MD at [facility] OR    DILATION AND CURETTAGE OF UTERUS  2007    ENDOMETRIAL ABLATION  10/22/2008    BTL    HYSTERECTOMY, TOTAL, LAPAROSCOPIC N/A 2012    Performed by Terrance Pereira MD at [facility] OR    SALPINGECTOMY, LAPAROSCOPIC Right 2012    Performed by Terrance Pereira MD at [facility] OR    SALPINGO-OOPHORECTOMY Left 2012    Performed by Terrance Pereira MD at [facility] OR     Family History   Problem Relation Age of Onset    Breast cancer Neg Hx     Ovarian cancer Neg Hx     Pulmonary embolism Mother      Social History     Socioeconomic History    Marital status:      Spouse name: Not on file    Number of children: Not on file    Years of education: Not on file    Highest education level: Not on file   Social Needs    Financial resource strain: Not on file    Food insecurity - worry: Not on file    Food " insecurity - inability: Not on file    Transportation needs - medical: Not on file    Transportation needs - non-medical: Not on file   Occupational History    Not on file   Tobacco Use    Smoking status: Never Smoker   Substance and Sexual Activity    Alcohol use: Yes     Comment: ocassionally    Drug use: No    Sexual activity: Yes   Other Topics Concern    Not on file   Social History Narrative    Not on file     Review of patient's allergies indicates:   Allergen Reactions    Cephalosporins Anaphylaxis    Penicillins Anaphylaxis    Codeine Itching and Swelling     Current Outpatient Medications:     eletriptan (RELPAX) 40 MG tablet, Take 40 mg by mouth as needed. may repeat in 2 hours if necessary , Disp: , Rfl:     levothyroxine (TIROSINT) 75 mcg Cap, Take 75 mcg by mouth once daily.  , Disp: , Rfl:     liothyronine (CYTOMEL) 5 MCG Tab, , Disp: , Rfl:     MINIVELLE 0.05 mg/24 hr, , Disp: , Rfl:     progesterone (PROMETRIUM) 100 MG capsule, TK 1 C PO QHS, Disp: , Rfl: 6    spironolactone (ALDACTONE) 50 MG tablet, , Disp: , Rfl:     VENTOLIN HFA 90 mcg/actuation inhaler, , Disp: , Rfl:     DEXLANSOPRAZOLE (DEXILANT ORAL), Take by mouth every morning. , Disp: , Rfl:     eletriptan (RELPAX) 40 MG tablet, may repeat in 2 hours if necessary. Patient has tried and failed all the other triptans, Disp: 9 tablet, Rfl: 5    galcanezumab-gnlm (EMGALITY) 120 mg/mL PnIj, Inject 120 mg into the skin every 28 days., Disp: 1 Syringe, Rfl: 5    ketorolac (SPRIX) 15.75 mg/spray Spry, 15.75 mg by Nasal route every 6 (six) hours., Disp: 5 each, Rfl: 3      Objective:      Vitals:    07/24/23 1006   BP: 124/88   Pulse: 103   Resp: 18     Body mass index is 24.71 kg/m².    Physical Exam  Constitutional:   She appears well-developed and well-nourished. She is well groomed. Appears in significant pain. Sunglasses on and lights in room dimmed to as low as possible.      Neurological Exam:  General: well-developed,  well-nourished  Tenderness to palpation of the auriculotemporal and left supraorbital nerves  Mental status: Awake and alert  Speech language: No dysarthria or aphasia on conversation  Cranial nerves: Face symmetric  Motor: Moves all extremities well  Coordination: No ataxia. No tremor.    Review of Data:  Lab Results   Component Value Date     01/11/2023    K 4.3 01/11/2023     01/11/2023    CO2 23 01/11/2023    BUN 9 01/11/2023    CREATININE 0.8 01/11/2023    GLU 96 01/11/2023    HGBA1C 5.4 01/11/2023    AST 20 01/11/2023    ALT 12 01/11/2023    ALBUMIN 4.3 01/11/2023    PROT 7.4 01/11/2023    BILITOT 0.5 01/11/2023    CHOL 157 01/11/2023    HDL 45 01/11/2023    LDLCALC 99.2 01/11/2023    TRIG 64 01/11/2023       Lab Results   Component Value Date    WBC 2.81 (L) 01/11/2023    HGB 13.0 01/11/2023    HCT 40.1 01/11/2023    MCV 91 01/11/2023     01/11/2023       Lab Results   Component Value Date    TSH 0.457 01/11/2023       AURICULOTEMPORAL Block, bilateral 07/24/2023  After having been explained the risks and benefits of the procedure, the patient provided consent . Then, the area over the auriculotemporal nerves was prepped with alcohol. Using a 30 gauge 1/2 inch needle, 1mL of local anesthetic was injected into each of the nerves bilaterally. There was negative aspiration for blood. The patient tolerated the procedure well without any apparent complications.  The local anesthetic was bupivacaine 0.25%.  The patient tolerated the procedure well and no complications were encountered.      Assessment and Plan     The patient has chronic migraines ( G43.719) and suffers from headaches more than 15 days a month lasting more than 4 hours a day with no relief of symptoms despite trying multiple medications as listed above (Gabapentin, Topamax, Toprol, Propranolol, Lisinopril, Emgality). No longer responding to Trudhesa/Maxalt; discontinued. Has good response with relpax, however, having difficulty  receiving > 6 tablets per month. As this is medication is providing the most therapeutic benefit, will increase monthly prescription. In addition, advised patient to continue vyepti infusion next week.     Patient complaining of flare during visit; provided analgesia via auriculotemporal nerve blocks bilaterally with great response. Due to new morning headache pattern, suspect myofascial pain or muscle spasms are contributing. Prescribed Skelaxin 400 mg QHS. As patient is also benefiting from botox, will add additional injection point to bilateral temporal regions for additional muscular release.     I have discussed the side effects of the medications prescribed and the patient acknowledges understanding    I have personally seen and evaluated this patient with Dr. Arlet Dugan. I have confirmed key portions of the history and examination. I concurred with the residents findings and documentation        Hemanth Kowalski M.D  Medical Director, Headache and Facial Pain  [facility]

## 2023-07-28 ENCOUNTER — INFUSION (OUTPATIENT)
Dept: INFUSION THERAPY | Facility: HOSPITAL | Age: 58
End: 2023-07-28
Attending: PSYCHIATRY & NEUROLOGY
Payer: COMMERCIAL

## 2023-07-28 VITALS
BODY MASS INDEX: 24.84 KG/M2 | SYSTOLIC BLOOD PRESSURE: 98 MMHG | RESPIRATION RATE: 18 BRPM | TEMPERATURE: 98 F | HEART RATE: 91 BPM | WEIGHT: 145.5 LBS | DIASTOLIC BLOOD PRESSURE: 63 MMHG | HEIGHT: 64 IN

## 2023-07-28 DIAGNOSIS — G43.719 INTRACTABLE CHRONIC MIGRAINE WITHOUT AURA AND WITHOUT STATUS MIGRAINOSUS: Primary | ICD-10-CM

## 2023-07-28 PROCEDURE — 25000003 PHARM REV CODE 250: Mod: PN | Performed by: PSYCHIATRY & NEUROLOGY

## 2023-07-28 PROCEDURE — 96365 THER/PROPH/DIAG IV INF INIT: CPT | Mod: PN

## 2023-07-28 PROCEDURE — 63600175 PHARM REV CODE 636 W HCPCS: Mod: JZ,JG,PN | Performed by: PSYCHIATRY & NEUROLOGY

## 2023-07-28 PROCEDURE — A4216 STERILE WATER/SALINE, 10 ML: HCPCS | Mod: PN | Performed by: PSYCHIATRY & NEUROLOGY

## 2023-07-28 RX ORDER — SODIUM CHLORIDE 0.9 % (FLUSH) 0.9 %
10 SYRINGE (ML) INJECTION
Status: CANCELLED | OUTPATIENT
Start: 2023-07-28

## 2023-07-28 RX ORDER — DIPHENHYDRAMINE HYDROCHLORIDE 50 MG/ML
25 INJECTION INTRAMUSCULAR; INTRAVENOUS ONCE AS NEEDED
Status: CANCELLED | OUTPATIENT
Start: 2023-07-28

## 2023-07-28 RX ORDER — ACETAMINOPHEN 500 MG
1000 TABLET ORAL ONCE AS NEEDED
Status: CANCELLED | OUTPATIENT
Start: 2023-07-28

## 2023-07-28 RX ORDER — SODIUM CHLORIDE 0.9 % (FLUSH) 0.9 %
10 SYRINGE (ML) INJECTION
Status: DISCONTINUED | OUTPATIENT
Start: 2023-07-28 | End: 2023-07-28 | Stop reason: HOSPADM

## 2023-07-28 RX ORDER — SODIUM CHLORIDE 9 MG/ML
INJECTION, SOLUTION INTRAVENOUS ONCE AS NEEDED
Status: CANCELLED | OUTPATIENT
Start: 2023-07-28

## 2023-07-28 RX ORDER — METHYLPREDNISOLONE SOD SUCC 125 MG
125 VIAL (EA) INJECTION ONCE AS NEEDED
Status: CANCELLED | OUTPATIENT
Start: 2023-07-28

## 2023-07-28 RX ORDER — ONDANSETRON 2 MG/ML
8 INJECTION INTRAMUSCULAR; INTRAVENOUS ONCE AS NEEDED
Status: CANCELLED | OUTPATIENT
Start: 2023-07-28

## 2023-07-28 RX ADMIN — SODIUM CHLORIDE: 9 INJECTION, SOLUTION INTRAVENOUS at 02:07

## 2023-07-28 RX ADMIN — EPTINEZUMAB-JJMR 300 MG: 100 INJECTION INTRAVENOUS at 02:07

## 2023-07-28 RX ADMIN — Medication 10 ML: at 02:07

## 2023-07-28 NOTE — PLAN OF CARE
Problem: Fatigue  Goal: Improved Activity Tolerance  Intervention: Promote Improved Energy  Flowsheets (Taken 7/28/2023 1330)  Fatigue Management:   activity schedule adjusted   paced activity encouraged   frequent rest breaks encouraged   fatigue-related activity identified  Sleep/Rest Enhancement:   relaxation techniques promoted   natural light exposure provided   regular sleep/rest pattern promoted  Activity Management:   Ambulated -L4   Ambulated to bathroom - L4   Ambulated in [place] - L4   Up in stretcher chair - L1     Problem: Adult Inpatient Plan of Care  Goal: Patient-Specific Goal (Individualized)  Outcome: Ongoing, Progressing  Flowsheets (Taken 7/28/2023 1330)  Anxieties, Fears or Concerns: none  Individualized Care Needs: recliner, pillow, warm blanket, dim lights, lowered window shades, conversation     Problem: Adult Inpatient Plan of Care  Goal: Plan of Care Review  Outcome: Ongoing, Progressing  Flowsheets (Taken 7/28/2023 1525)  Plan of Care Reviewed With: patient   Pt tolerated her Vyepti infusion well, NAD. No new c/o voiced. Pt given a schedule and reviewed, pt verbalized understanding. Pt ambulated out of the clinic without difficulty.

## 2023-08-24 NOTE — TELEPHONE ENCOUNTER
----- Message from Katherine Montiel sent at 6/25/2019  3:06 PM CDT -----  Contact: [phone]  Patient is requesting a call back from the nurse stated EMGALITY not working.  Requesting medical advice and a sooner appt date.    Please call the patient upon request at phone number [phone].  
Called patient in regards to her migraine  , patient stated emgality is not working and relpax is not giving her much relief. Will forward message to provider.   
hard copy, drawn during this pregnancy

## 2023-09-06 ENCOUNTER — PROCEDURE VISIT (OUTPATIENT)
Dept: NEUROLOGY | Facility: CLINIC | Age: 58
End: 2023-09-06
Payer: COMMERCIAL

## 2023-09-06 VITALS
BODY MASS INDEX: 24.24 KG/M2 | DIASTOLIC BLOOD PRESSURE: 83 MMHG | WEIGHT: 142 LBS | SYSTOLIC BLOOD PRESSURE: 118 MMHG | HEIGHT: 64 IN | RESPIRATION RATE: 17 BRPM | HEART RATE: 96 BPM | TEMPERATURE: 98 F

## 2023-09-06 DIAGNOSIS — G43.719 INTRACTABLE CHRONIC MIGRAINE WITHOUT AURA AND WITHOUT STATUS MIGRAINOSUS: Primary | ICD-10-CM

## 2023-09-06 PROCEDURE — 64615 PR CHEMODENERVATION OF MUSCLE FOR CHRONIC MIGRAINE: ICD-10-PCS | Mod: S$GLB,,, | Performed by: PSYCHIATRY & NEUROLOGY

## 2023-09-06 PROCEDURE — 64615 CHEMODENERV MUSC MIGRAINE: CPT | Mod: S$GLB,,, | Performed by: PSYCHIATRY & NEUROLOGY

## 2023-09-06 RX ORDER — METAXALONE 800 MG/1
800 TABLET ORAL 3 TIMES DAILY
Qty: 30 TABLET | Refills: 0 | Status: SHIPPED | OUTPATIENT
Start: 2023-09-06 | End: 2023-09-16

## 2023-09-06 NOTE — PROCEDURES
ProceduresBOTOX PROCEDURE    PROCEDURE PERFORMED: Botulinum toxin injection (13938)    CLINICAL INDICATION: G43.719    A time out was conducted just before the start of the procedure to verify the correct patient and procedure, procedure location, and all relevant critical information.   The patient meets criteria for chronic headaches according to the ICHD-II, the patient has more than 15 headaches a month out of at least 8 are migraines which last for more than 4 hours a day.    The Botox injections have achieved well over 50%  improvement in the patient's symptoms. Migraines have been reduced at least 7 days per month and the number of cumulative hours suffering with headaches has been reduced at least 100 total hours per month. Today she does have a headache indicating that the Botox has worn off. Frequency of treatment is every 3 months unless no response to the treatments, at which time we will discontinue the injections.      DESCRIPTION OF PROCEDURE: After obtaining informed consent and under aseptic technique, a total of 175 units of botulinum toxin type A were injected in the following muscles: Procerus 5 units,  5 units bilaterally, frontalis 20 units, temporalis 20 units bilaterally, occipitalis 15 units, upper cervical paraspinals 10 units bilaterally, masseters 10 units bilaterally and trapezius 15 units bilaterally. The patient was given a total of 175 units in 31 sites.The patient tolerated the procedure well. There were no complications. The patient was given a prescription for repeat treatment in 3 months.     Unavoidable waste 25 units      Hemanth Kowalski M.D  Medical Director, Headache and Facial Pain  [facility]

## 2023-09-12 ENCOUNTER — OFFICE VISIT (OUTPATIENT)
Dept: GASTROENTEROLOGY | Facility: CLINIC | Age: 58
End: 2023-09-12
Payer: COMMERCIAL

## 2023-09-12 VITALS — BODY MASS INDEX: 24.24 KG/M2 | WEIGHT: 142 LBS | HEIGHT: 64 IN | RESPIRATION RATE: 17 BRPM

## 2023-09-12 DIAGNOSIS — K92.1 BLOOD IN STOOL: ICD-10-CM

## 2023-09-12 DIAGNOSIS — K58.2 IRRITABLE BOWEL SYNDROME WITH BOTH CONSTIPATION AND DIARRHEA: Primary | ICD-10-CM

## 2023-09-12 DIAGNOSIS — K57.92 DIVERTICULITIS: ICD-10-CM

## 2023-09-12 PROCEDURE — 1160F RVW MEDS BY RX/DR IN RCRD: CPT | Mod: CPTII,S$GLB,, | Performed by: INTERNAL MEDICINE

## 2023-09-12 PROCEDURE — 3008F PR BODY MASS INDEX (BMI) DOCUMENTED: ICD-10-PCS | Mod: CPTII,S$GLB,, | Performed by: INTERNAL MEDICINE

## 2023-09-12 PROCEDURE — 3044F HG A1C LEVEL LT 7.0%: CPT | Mod: CPTII,S$GLB,, | Performed by: INTERNAL MEDICINE

## 2023-09-12 PROCEDURE — 99203 PR OFFICE/OUTPT VISIT, NEW, LEVL III, 30-44 MIN: ICD-10-PCS | Mod: S$GLB,,, | Performed by: INTERNAL MEDICINE

## 2023-09-12 PROCEDURE — 1159F MED LIST DOCD IN RCRD: CPT | Mod: CPTII,S$GLB,, | Performed by: INTERNAL MEDICINE

## 2023-09-12 PROCEDURE — 3008F BODY MASS INDEX DOCD: CPT | Mod: CPTII,S$GLB,, | Performed by: INTERNAL MEDICINE

## 2023-09-12 PROCEDURE — 99999 PR PBB SHADOW E&M-EST. PATIENT-LVL IV: ICD-10-PCS | Mod: PBBFAC,,, | Performed by: INTERNAL MEDICINE

## 2023-09-12 PROCEDURE — 1160F PR REVIEW ALL MEDS BY PRESCRIBER/CLIN PHARMACIST DOCUMENTED: ICD-10-PCS | Mod: CPTII,S$GLB,, | Performed by: INTERNAL MEDICINE

## 2023-09-12 PROCEDURE — 99999 PR PBB SHADOW E&M-EST. PATIENT-LVL IV: CPT | Mod: PBBFAC,,, | Performed by: INTERNAL MEDICINE

## 2023-09-12 PROCEDURE — 3044F PR MOST RECENT HEMOGLOBIN A1C LEVEL <7.0%: ICD-10-PCS | Mod: CPTII,S$GLB,, | Performed by: INTERNAL MEDICINE

## 2023-09-12 PROCEDURE — 1159F PR MEDICATION LIST DOCUMENTED IN MEDICAL RECORD: ICD-10-PCS | Mod: CPTII,S$GLB,, | Performed by: INTERNAL MEDICINE

## 2023-09-12 PROCEDURE — 99203 OFFICE O/P NEW LOW 30 MIN: CPT | Mod: S$GLB,,, | Performed by: INTERNAL MEDICINE

## 2023-09-12 NOTE — PROGRESS NOTES
Pt presents for evaluation recurrent diverticulitis. Multiple episodes past several years, respond to cipro/flagyl. Last CT 5/2023 unremarkable. Last C-scope nearly 10 years ago. Positive chronic alternating diarrhea and constipation. No rectal bleeding. No recent fever or jaundice. Appetite and weight stable. Father with hx of colon polyps (no CA). Followed by [facility] in past.    REVIEW OF SYSTEMS:   Constitutional: Negative for fever, appetite change and unexpected weight change.  HENT: Negative for sore throat and trouble swallowing.  Eyes: Negative for visual disturbance.  Respiratory: Negative for chest tightness, shortness of breath and wheezing.  Cardiovascular: Negative for chest pain.  Gastrointestinal:  as per HPI  Genitourinary: Negative for dysuria, frequency and hematuria.    PHYSICAL EXAMINATION:                                                        GENERAL:  Comfortable, in no acute distress.      SKIN: Non-jaundiced.                             HEENT EXAM:  Nonicteric.  No adenopathy.  Oropharynx is clear.               NECK:  Supple.                                                               LUNGS:  Clear.                                                               CARDIAC:  Regular rate and rhythm.  S1, S2.  No murmur.                      ABDOMEN:  Soft, positive bowel sounds, nontender.  No hepatosplenomegaly or masses.  No rebound or guarding.                                             EXTREMITIES:  No edema.     NEURO: CN II-XII intact; motor/sensory non-focal.    IMP: 1. Altered stool pattern (diarrhea/constipation)          2. Recurrent diverticulitis          3. IBS    PLAN: C-scope

## 2023-10-27 ENCOUNTER — TELEPHONE (OUTPATIENT)
Dept: INFUSION THERAPY | Facility: HOSPITAL | Age: 58
End: 2023-10-27
Payer: COMMERCIAL

## 2023-10-27 NOTE — TELEPHONE ENCOUNTER
----- Message from Chanel Clarke sent at 10/27/2023  3:08 PM CDT -----  Regarding: reschedule  Contact: patient  Type:  Sooner Appointment Request    Caller is requesting a sooner appointment.  Caller declined first available appointment listed below.  Caller will not accept being placed on the waitlist and is requesting a message be sent to doctor.    Name of Caller:  patient  When is the first available appointment?    Symptoms:  VYEPTI q 3months  Would the patient rather a call back or a response via [facility]? Call back  Best Call Back Number:  [phone] (home)   Additional Information:  Patient is sorry she thought the infusion was next week. Patient states Friday is the only day that is not good for her. Please call patient to schedule.Thanks!

## 2023-10-30 ENCOUNTER — PATIENT MESSAGE (OUTPATIENT)
Dept: ENDOSCOPY | Facility: HOSPITAL | Age: 58
End: 2023-10-30
Payer: COMMERCIAL

## 2023-11-01 ENCOUNTER — ANESTHESIA (OUTPATIENT)
Dept: ENDOSCOPY | Facility: HOSPITAL | Age: 58
End: 2023-11-01
Payer: COMMERCIAL

## 2023-11-01 ENCOUNTER — ANESTHESIA EVENT (OUTPATIENT)
Dept: ENDOSCOPY | Facility: HOSPITAL | Age: 58
End: 2023-11-01
Payer: COMMERCIAL

## 2023-11-01 ENCOUNTER — HOSPITAL ENCOUNTER (OUTPATIENT)
Facility: HOSPITAL | Age: 58
Discharge: HOME | End: 2023-11-01
Attending: INTERNAL MEDICINE | Admitting: INTERNAL MEDICINE
Payer: COMMERCIAL

## 2023-11-01 VITALS
OXYGEN SATURATION: 100 % | WEIGHT: 136 LBS | HEART RATE: 84 BPM | DIASTOLIC BLOOD PRESSURE: 61 MMHG | RESPIRATION RATE: 16 BRPM | TEMPERATURE: 97 F | BODY MASS INDEX: 23.22 KG/M2 | HEIGHT: 64 IN | SYSTOLIC BLOOD PRESSURE: 131 MMHG

## 2023-11-01 DIAGNOSIS — R19.4 CHANGE IN BOWEL HABITS: ICD-10-CM

## 2023-11-01 PROCEDURE — 88305 TISSUE EXAM BY PATHOLOGIST: CPT | Mod: PO | Performed by: PATHOLOGY

## 2023-11-01 PROCEDURE — 45380 PR COLONOSCOPY,BIOPSY: ICD-10-PCS | Mod: ,,, | Performed by: INTERNAL MEDICINE

## 2023-11-01 PROCEDURE — 88305 TISSUE EXAM BY PATHOLOGIST: ICD-10-PCS | Mod: 26,,, | Performed by: PATHOLOGY

## 2023-11-01 PROCEDURE — D9220A PRA ANESTHESIA: Mod: ANES,,, | Performed by: ANESTHESIOLOGY

## 2023-11-01 PROCEDURE — D9220A PRA ANESTHESIA: Mod: CRNA,,, | Performed by: NURSE ANESTHETIST, CERTIFIED REGISTERED

## 2023-11-01 PROCEDURE — 45380 COLONOSCOPY AND BIOPSY: CPT | Mod: ,,, | Performed by: INTERNAL MEDICINE

## 2023-11-01 PROCEDURE — 25000003 PHARM REV CODE 250: Mod: PO | Performed by: NURSE ANESTHETIST, CERTIFIED REGISTERED

## 2023-11-01 PROCEDURE — 63600175 PHARM REV CODE 636 W HCPCS: Mod: PO | Performed by: INTERNAL MEDICINE

## 2023-11-01 PROCEDURE — D9220A PRA ANESTHESIA: ICD-10-PCS | Mod: CRNA,,, | Performed by: NURSE ANESTHETIST, CERTIFIED REGISTERED

## 2023-11-01 PROCEDURE — 88305 TISSUE EXAM BY PATHOLOGIST: CPT | Mod: 26,,, | Performed by: PATHOLOGY

## 2023-11-01 PROCEDURE — 37000008 HC ANESTHESIA 1ST 15 MINUTES: Mod: PO | Performed by: INTERNAL MEDICINE

## 2023-11-01 PROCEDURE — 37000009 HC ANESTHESIA EA ADD 15 MINS: Mod: PO | Performed by: INTERNAL MEDICINE

## 2023-11-01 PROCEDURE — 63600175 PHARM REV CODE 636 W HCPCS: Mod: PO | Performed by: NURSE ANESTHETIST, CERTIFIED REGISTERED

## 2023-11-01 PROCEDURE — 45380 COLONOSCOPY AND BIOPSY: CPT | Mod: PO | Performed by: INTERNAL MEDICINE

## 2023-11-01 PROCEDURE — D9220A PRA ANESTHESIA: ICD-10-PCS | Mod: ANES,,, | Performed by: ANESTHESIOLOGY

## 2023-11-01 PROCEDURE — 27201012 HC FORCEPS, HOT/COLD, DISP: Mod: PO | Performed by: INTERNAL MEDICINE

## 2023-11-01 RX ORDER — PROPOFOL 10 MG/ML
VIAL (ML) INTRAVENOUS
Status: DISCONTINUED | OUTPATIENT
Start: 2023-11-01 | End: 2023-11-01

## 2023-11-01 RX ORDER — SODIUM CHLORIDE 0.9 % (FLUSH) 0.9 %
10 SYRINGE (ML) INJECTION
Status: DISCONTINUED | OUTPATIENT
Start: 2023-11-01 | End: 2023-11-01 | Stop reason: HOSPADM

## 2023-11-01 RX ORDER — SODIUM CHLORIDE, SODIUM LACTATE, POTASSIUM CHLORIDE, CALCIUM CHLORIDE 600; 310; 30; 20 MG/100ML; MG/100ML; MG/100ML; MG/100ML
INJECTION, SOLUTION INTRAVENOUS CONTINUOUS
Status: DISCONTINUED | OUTPATIENT
Start: 2023-11-01 | End: 2023-11-01 | Stop reason: HOSPADM

## 2023-11-01 RX ORDER — LIDOCAINE HYDROCHLORIDE 20 MG/ML
INJECTION INTRAVENOUS
Status: DISCONTINUED | OUTPATIENT
Start: 2023-11-01 | End: 2023-11-01

## 2023-11-01 RX ADMIN — PROPOFOL 50 MG: 10 INJECTION, EMULSION INTRAVENOUS at 07:11

## 2023-11-01 RX ADMIN — PROPOFOL 125 MG: 10 INJECTION, EMULSION INTRAVENOUS at 07:11

## 2023-11-01 RX ADMIN — LIDOCAINE HYDROCHLORIDE 100 MG: 20 INJECTION INTRAVENOUS at 07:11

## 2023-11-01 RX ADMIN — SODIUM CHLORIDE, POTASSIUM CHLORIDE, SODIUM LACTATE AND CALCIUM CHLORIDE: 600; 310; 30; 20 INJECTION, SOLUTION INTRAVENOUS at 07:11

## 2023-11-01 NOTE — ANESTHESIA POSTPROCEDURE EVALUATION
Anesthesia Post Evaluation    Patient: Mayra Smart    Procedure(s) Performed: Procedure(s) (LRB):  COLONOSCOPY (N/A)    Final Anesthesia Type: general      Level of consciousness: awake and alert  Post-procedure vital signs: reviewed and stable  Pain control: Pain has been treated.  Airway patency: patent    PONV status: Absent or treated.  Anesthetic complications: no      Cardiovascular status: hemodynamically stable  Respiratory status: unassisted  Hydration status: euvolemic            Vitals Value Taken Time   /65 11/01/23 0807   Temp 36.3 °C (97.3 °F) 11/01/23 0755   Pulse 83 11/01/23 0807   Resp 16 11/01/23 0807   SpO2 100 % 11/01/23 0807         No case tracking events are documented in the log.      Pain/Ursula Score: Ursula Score: 10 (11/1/2023  7:55 AM)

## 2023-11-01 NOTE — ANESTHESIA PREPROCEDURE EVALUATION
11/01/2023  Mayra Smart is a 58 y.o., female.  Patient Active Problem List   Diagnosis    Fibroids, submucosal    LLQ abdominal pain    Essential hypertension, malignant    Hypothyroidism due to Hashimoto's thyroiditis    Palpitations    Plantar fasciitis of right foot    Plantar fasciitis of left foot    Status migrainosus    Intractable chronic migraine without aura and without status migrainosus    Decreased range of motion (ROM) of shoulder    Weakness of upper extremity           Pre-op Assessment    I have reviewed the Patient Summary Reports.       I have reviewed the Medications.     Review of Systems  Anesthesia Hx:  No problems with previous Anesthesia   Denies Personal Hx of Anesthesia complications.       Physical Exam    Airway:  No airway management difficulties anticipated  Dental:No active dental issues noted  Chest/Lungs:  Clear to auscultation    Heart:  Rate: Normal  Rhythm: Regular Rhythm  Sounds: Normal        Anesthesia Plan  Type of Anesthesia, risks & benefits discussed:    Anesthesia Type: Gen Natural Airway  Informed Consent: Informed consent signed with the Patient and all parties understand the risks and agree with anesthesia plan.  All questions answered.   ASA Score: 3  Anesthesia Plan Notes: Chart reviewed. Patient seen and examined. Anesthesia plan discussed and questions answered. E-consent signed. Declan Tang MD    Ready For Surgery From Anesthesia Perspective.     .

## 2023-11-01 NOTE — H&P
History & Physical - Short Stay  Gastroenterology      SUBJECTIVE:     Procedure: Colonoscopy    Chief Complaint/Indication for Procedure: Abdominal Pain and Change in Bowel Habits    Facility-Administered Medications Prior to Admission   Medication    onabotulinumtoxina injection 200 Units     PTA Medications   Medication Sig    atorvastatin (LIPITOR) 10 MG tablet TAKE 1 TABLET(10 MG) BY MOUTH EVERY DAY    meloxicam (MOBIC) 15 MG tablet Take 1 tablet (15 mg total) by mouth once daily.    MINIVELLE 0.05 mg/24 hr Place 1 patch onto the skin twice a week.    minoxidiL (LONITEN) 2.5 MG tablet Take 1 tablet (2.5 mg total) by mouth once daily. (Patient taking differently: Take 2.5 mg by mouth every evening.)    progesterone (PROMETRIUM) 100 MG capsule TK 1 C PO QHS    spironolactone (ALDACTONE) 100 MG tablet Take 1 tablet (100 mg total) by mouth once daily. (Patient taking differently: Take 100 mg by mouth every evening.)    thyroid, pork, (NP THYROID) 60 mg Tab Take 1 tablet by mouth once a day    budesonide-formoterol 160-4.5 mcg (SYMBICORT) 160-4.5 mcg/actuation HFAA Inhale 2 puffs into the lungs every 12 (twelve) hours. Controller    clobetasol 0.05% (TEMOVATE) 0.05 % Oint Apply twice a day to affected areas of skin on body until improved, up to 4-6 weeks, then can stop and restart as needed    eletriptan (RELPAX) 40 MG tablet TAKE 1 TABLET BY MOUTH AS NEEDED, MAY REPEAT IN 2 HOURS IF NECESSARY    EPINEPHrine (EPIPEN) 0.3 mg/0.3 mL AtIn Inject 0.3 mLs (0.3 mg total) into the muscle once as needed. (Patient not taking: Reported on 3/7/2022)    lasmiditan (REYVOW) tablet 100 mg Take 1 tablet (100 mg) by mouth once daily as needed (migraine). (Patient taking differently: Take 100 mg by mouth 2 (two) times daily as needed (migraine).)    promethazine (PHENERGAN) 25 MG tablet Take 1 tablet (25 mg total) by mouth every 8 (eight) hours as needed for Nausea.    tobramycin-dexAMETHasone 0.3-0.1% (TOBRADEX) 0.3-0.1 % DrpS  "Place 1 drop into the right eye every 6 (six) hours.    VENTOLIN HFA 90 mcg/actuation inhaler        Review of patient's allergies indicates:   Allergen Reactions    Cephalosporins Anaphylaxis    Penicillins Anaphylaxis    Codeine Itching and Swelling    Opioids - morphine analogues      Vertigo, nausea, itchy throat, tongue enlgmt, rash    Vicoprofen [hydrocodone-ibuprofen] Other (See Comments)     Severe nausea        Past Medical History:   Diagnosis Date    Back pain     Bell's palsy     Breast disorder     fibercystic tissue    Fibroids     uterine    GERD (gastroesophageal reflux disease)     Headache     History of anesthesia reaction     sensitive to anesthesia, " Major hangover"    Neck pain     Pelvic pain in female     left lower quadrant    Preeclampsia      Past Surgical History:   Procedure Laterality Date     SECTION      times 2    DILATION AND CURETTAGE OF UTERUS  2007    ENDOMETRIAL ABLATION  10/22/2008    BTL    HYSTERECTOMY      OOPHORECTOMY      REDUCTION OF BOTH BREASTS       Family History   Problem Relation Age of Onset    Pulmonary embolism Mother     Skin cancer Mother     Skin cancer Father     Breast cancer Neg Hx     Ovarian cancer Neg Hx      Social History     Tobacco Use    Smoking status: Never    Smokeless tobacco: Never   Substance Use Topics    Alcohol use: Yes     Comment: ocassionally    Drug use: No         OBJECTIVE:     Vital Signs (Most Recent)       Physical Exam:                                                       GENERAL:  Comfortable, in no acute distress.                                 HEENT EXAM:  Nonicteric.  No adenopathy.  Oropharynx is clear.               NECK:  Supple.                                                               LUNGS:  Clear.                                                               CARDIAC:  Regular rate and rhythm.  S1, S2.  No murmur.                      ABDOMEN:  Soft, positive bowel sounds, nontender.  No " hepatosplenomegaly or masses.  No rebound or guarding.                                             EXTREMITIES:  No edema.     MENTAL STATUS:  Normal, alert and oriented.      ASSESSMENT/PLAN:     Assessment: Abdominal Pain and Change in Bowel Habits    Plan: Colonoscopy    Anesthesia Plan: General    ASA Grade: ASA 2 - Patient with mild systemic disease with no functional limitations    MALLAMPATI SCORE:  I (soft palate, uvula, fauces, and tonsillar pillars visible)

## 2023-11-01 NOTE — DISCHARGE SUMMARY
[facility] - [facility]oscopy  Discharge Note  Short Stay    Discharge Note  Short Stay      SUMMARY     Admit Date: 11/1/2023    Attending Physician: Lloyd Fleming MD     Discharge Physician: Lloyd Fleming MD    Discharge Date: 11/1/2023 7:54 AM    Final Diagnosis: Change in bowel habits [R19.4]  LLQ pain [R10.32]    Disposition: HOME OR SELF CARE    Patient Instructions:   Current Discharge Medication List        CONTINUE these medications which have NOT CHANGED    Details   atorvastatin (LIPITOR) 10 MG tablet TAKE 1 TABLET(10 MG) BY MOUTH EVERY DAY  Qty: 30 tablet, Refills: 5    Comments: ZERO refills remain on this prescription. Your patient is requesting advance approval of refills for this medication to PREVENT ANY MISSED DOSES      MINIVELLE 0.05 mg/24 hr Place 1 patch onto the skin twice a week.      minoxidiL (LONITEN) 2.5 MG tablet Take 1 tablet (2.5 mg total) by mouth once daily.  Qty: 90 tablet, Refills: 1    Comments: .  Associated Diagnoses: Androgenetic alopecia      progesterone (PROMETRIUM) 100 MG capsule TK 1 C PO QHS  Refills: 6      spironolactone (ALDACTONE) 100 MG tablet Take 1 tablet (100 mg total) by mouth once daily.  Qty: 90 tablet, Refills: 1    Associated Diagnoses: Androgenetic alopecia      thyroid, pork, (NP THYROID) 60 mg Tab Take 1 tablet by mouth once a day  Qty: 90 tablet, Refills: 3    Associated Diagnoses: Nonspecific abnormal results of thyroid function study; Adult onset hypothyroidism      budesonide-formoterol 160-4.5 mcg (SYMBICORT) 160-4.5 mcg/actuation HFAA Inhale 2 puffs into the lungs every 12 (twelve) hours. Controller  Qty: 10 g, Refills: 5      clobetasol 0.05% (TEMOVATE) 0.05 % Oint Apply twice a day to affected areas of skin on body until improved, up to 4-6 weeks, then can stop and restart as needed  Qty: 15 g, Refills: 1    Associated Diagnoses: Insect bite of right shoulder, initial encounter      eletriptan (RELPAX) 40 MG tablet TAKE 1 TABLET BY MOUTH AS  NEEDED, MAY REPEAT IN 2 HOURS IF NECESSARY  Qty: 12 tablet, Refills: 11    Associated Diagnoses: Intractable chronic migraine without aura and without status migrainosus      EPINEPHrine (EPIPEN) 0.3 mg/0.3 mL AtIn Inject 0.3 mLs (0.3 mg total) into the muscle once as needed.  Qty: 2 Device, Refills: 3      lasmiditan (REYVOW) tablet 100 mg Take 1 tablet (100 mg) by mouth once daily as needed (migraine).  Qty: 8 tablet, Refills: 11    Comments: Verified per Dr. Kowalski Reyvow 100mg 1 tablet by mouth once a day      meloxicam (MOBIC) 15 MG tablet Take 1 tablet (15 mg total) by mouth once daily.  Qty: 90 tablet, Refills: 1    Comments: Known to tolerate.      promethazine (PHENERGAN) 25 MG tablet Take 1 tablet (25 mg total) by mouth every 8 (eight) hours as needed for Nausea.  Qty: 30 tablet, Refills: 11    Associated Diagnoses: Nausea      tobramycin-dexAMETHasone 0.3-0.1% (TOBRADEX) 0.3-0.1 % DrpS Place 1 drop into the right eye every 6 (six) hours.  Qty: 5 mL, Refills: 0      VENTOLIN HFA 90 mcg/actuation inhaler              Discharge Procedure Orders (must include Diet, Follow-up, Activity)    Follow Up:  Follow up with PCP as previously scheduled  Resume routine diet.  Activity as tolerated.    No driving day of procedure.

## 2023-11-01 NOTE — TRANSFER OF CARE
"Anesthesia Transfer of Care Note    Patient: Mayra Smart    Procedure(s) Performed: Procedure(s) (LRB):  COLONOSCOPY (N/A)    Patient location: PACU    Anesthesia Type: general    Transport from OR: Transported from OR on room air with adequate spontaneous ventilation    Post pain: adequate analgesia    Post assessment: no apparent anesthetic complications and tolerated procedure well    Post vital signs: stable    Level of consciousness: sedated and awake    Nausea/Vomiting: no nausea/vomiting    Complications: none    Transfer of care protocol was followed      Last vitals:   Visit Vitals  /74 (BP Location: Right arm, Patient Position: Lying)   Pulse 75   Temp 36.2 °C (97.1 °F) (Skin)   Resp 12   Ht 5' 4" (1.626 m)   Wt 61.7 kg (136 lb)   LMP 10/22/2008   SpO2 98%   Breastfeeding No   BMI 23.34 kg/m²     "

## 2023-11-01 NOTE — PROVATION PATIENT INSTRUCTIONS
Discharge Summary/Instructions after an Endoscopic Procedure  Patient Name: Mayra Smart  Patient MRN: [id number]  Patient YOB: 1965 Wednesday, November 1, 2023  Lloyd Fleming MD  Dear patient,  As a result of recent federal legislation (The Federal Cures Act), you may   receive lab or pathology results from your procedure in your [facility]   account before your physician is able to contact you. Your physician or   their representative will relay the results to you with their   recommendations at their soonest availability.  Thank you,  RESTRICTIONS:  During your procedure today, you received medications for sedation.  These   medications may affect your judgment, balance and coordination.  Therefore,   for 24 hours, you have the following restrictions:   - DO NOT drive a car, operate machinery, make legal/financial decisions,   sign important papers or drink alcohol.    ACTIVITY:  Today: no heavy lifting, straining or running due to procedural   sedation/anesthesia.  The following day: return to full activity including work.  DIET:  Eat and drink normally unless instructed otherwise.     TREATMENT FOR COMMON SIDE EFFECTS:  - Mild abdominal pain, nausea, belching, bloating or excessive gas:  rest,   eat lightly and use a heating pad.  - Sore Throat: treat with throat lozenges and/or gargle with warm salt   water.  - Because air was used during the procedure, expelling large amounts of air   from your rectum or belching is normal.  - If a bowel prep was taken, you may not have a bowel movement for 1-3 days.    This is normal.  SYMPTOMS TO WATCH FOR AND REPORT TO YOUR PHYSICIAN:  1. Abdominal pain or bloating, other than gas cramps.  2. Chest pain.  3. Back pain.  4. Signs of infection such as: chills or fever occurring within 24 hours   after the procedure.  5. Rectal bleeding, which would show as bright red, maroon, or black stools.   (A tablespoon of blood from the rectum is not serious,  especially if   hemorrhoids are present.)  6. Vomiting.  7. Weakness or dizziness.  GO DIRECTLY TO THE NEAREST EMERGENCY ROOM IF YOU HAVE ANY OF THE FOLLOWING:      Difficulty breathing              Chills and/or fever over 101 F   Persistent vomiting and/or vomiting blood   Severe abdominal pain   Severe chest pain   Black, tarry stools   Bleeding- more than one tablespoon   Any other symptom or condition that you feel may need urgent attention  Your doctor recommends these additional instructions:  If any biopsies were taken, your doctors clinic will contact you in 1 to 2   weeks with any results.  We are waiting for your pathology results.   Your physician has recommended a repeat colonoscopy in 10 years for   screening purposes.   You are being discharged to home.  For questions, problems or results please call your physician - Lloyd Fleming MD at Work:  [phone].  EMERGENCY PHONE NUMBER: [phone], LAB RESULTS: [phone]  IF A COMPLICATION OR EMERGENCY SITUATION ARISES AND YOU ARE UNABLE TO REACH   YOUR PHYSICIAN - GO DIRECTLY TO THE EMERGENCY ROOM.  ___________________________________________  Nurse Signature  ___________________________________________  Patient/Designated Responsible Party Signature  Lloyd Fleming MD  11/1/2023 7:53:16 AM  This report has been verified and signed electronically.  Dear patient,  As a result of recent federal legislation (The Federal Cures Act), you may   receive lab or pathology results from your procedure in your [facility]   account before your physician is able to contact you. Your physician or   their representative will relay the results to you with their   recommendations at their soonest availability.  Thank you.  PROVATION

## 2023-11-07 ENCOUNTER — INFUSION (OUTPATIENT)
Dept: INFUSION THERAPY | Facility: HOSPITAL | Age: 58
End: 2023-11-07
Attending: PSYCHIATRY & NEUROLOGY
Payer: COMMERCIAL

## 2023-11-07 VITALS
BODY MASS INDEX: 23.26 KG/M2 | SYSTOLIC BLOOD PRESSURE: 110 MMHG | WEIGHT: 136.25 LBS | RESPIRATION RATE: 18 BRPM | DIASTOLIC BLOOD PRESSURE: 70 MMHG | HEIGHT: 64 IN | HEART RATE: 77 BPM | TEMPERATURE: 98 F

## 2023-11-07 DIAGNOSIS — G43.719 INTRACTABLE CHRONIC MIGRAINE WITHOUT AURA AND WITHOUT STATUS MIGRAINOSUS: Primary | ICD-10-CM

## 2023-11-07 LAB
FINAL PATHOLOGIC DIAGNOSIS: NORMAL
Lab: NORMAL

## 2023-11-07 PROCEDURE — 63600175 PHARM REV CODE 636 W HCPCS: Mod: JZ,JG,PN | Performed by: PSYCHIATRY & NEUROLOGY

## 2023-11-07 PROCEDURE — 25000003 PHARM REV CODE 250: Mod: PN | Performed by: PSYCHIATRY & NEUROLOGY

## 2023-11-07 PROCEDURE — 96365 THER/PROPH/DIAG IV INF INIT: CPT | Mod: PN

## 2023-11-07 RX ORDER — DIPHENHYDRAMINE HYDROCHLORIDE 50 MG/ML
25 INJECTION INTRAMUSCULAR; INTRAVENOUS ONCE AS NEEDED
Status: CANCELLED | OUTPATIENT
Start: 2023-11-07

## 2023-11-07 RX ORDER — SODIUM CHLORIDE 0.9 % (FLUSH) 0.9 %
10 SYRINGE (ML) INJECTION
Status: CANCELLED | OUTPATIENT
Start: 2023-11-07

## 2023-11-07 RX ORDER — METHYLPREDNISOLONE SOD SUCC 125 MG
125 VIAL (EA) INJECTION ONCE AS NEEDED
Status: CANCELLED | OUTPATIENT
Start: 2023-11-07

## 2023-11-07 RX ORDER — SODIUM CHLORIDE 0.9 % (FLUSH) 0.9 %
10 SYRINGE (ML) INJECTION
Status: DISCONTINUED | OUTPATIENT
Start: 2023-11-07 | End: 2023-11-07 | Stop reason: HOSPADM

## 2023-11-07 RX ORDER — SODIUM CHLORIDE 9 MG/ML
INJECTION, SOLUTION INTRAVENOUS ONCE AS NEEDED
Status: CANCELLED | OUTPATIENT
Start: 2023-11-07

## 2023-11-07 RX ORDER — ONDANSETRON 2 MG/ML
8 INJECTION INTRAMUSCULAR; INTRAVENOUS ONCE AS NEEDED
Status: CANCELLED | OUTPATIENT
Start: 2023-11-07

## 2023-11-07 RX ORDER — ACETAMINOPHEN 500 MG
1000 TABLET ORAL ONCE AS NEEDED
Status: CANCELLED | OUTPATIENT
Start: 2023-11-07

## 2023-11-07 RX ADMIN — EPTINEZUMAB-JJMR 300 MG: 100 INJECTION INTRAVENOUS at 03:11

## 2023-11-07 RX ADMIN — SODIUM CHLORIDE: 9 INJECTION, SOLUTION INTRAVENOUS at 03:11

## 2023-11-07 NOTE — PLAN OF CARE
Tolerated vyepti well.  No reactions noted.  No questions or concerns at this time.  Ambulated off unit in NAD.

## 2023-11-22 ENCOUNTER — PROCEDURE VISIT (OUTPATIENT)
Dept: NEUROLOGY | Facility: CLINIC | Age: 58
End: 2023-11-22
Payer: COMMERCIAL

## 2023-11-22 VITALS
WEIGHT: 140.13 LBS | RESPIRATION RATE: 17 BRPM | HEART RATE: 89 BPM | DIASTOLIC BLOOD PRESSURE: 76 MMHG | BODY MASS INDEX: 23.92 KG/M2 | SYSTOLIC BLOOD PRESSURE: 103 MMHG | HEIGHT: 64 IN

## 2023-11-22 DIAGNOSIS — G43.719 INTRACTABLE CHRONIC MIGRAINE WITHOUT AURA AND WITHOUT STATUS MIGRAINOSUS: Primary | ICD-10-CM

## 2023-11-22 PROCEDURE — 64615 CHEMODENERV MUSC MIGRAINE: CPT | Mod: S$GLB,,, | Performed by: PSYCHIATRY & NEUROLOGY

## 2023-11-22 PROCEDURE — 64615 PR CHEMODENERVATION OF MUSCLE FOR CHRONIC MIGRAINE: ICD-10-PCS | Mod: S$GLB,,, | Performed by: PSYCHIATRY & NEUROLOGY

## 2023-11-22 PROCEDURE — 96372 THER/PROPH/DIAG INJ SC/IM: CPT | Mod: 59,S$GLB,, | Performed by: PSYCHIATRY & NEUROLOGY

## 2023-11-22 PROCEDURE — 96372 PR INJECTION,THERAP/PROPH/DIAG2ST, IM OR SUBCUT: ICD-10-PCS | Mod: 59,S$GLB,, | Performed by: PSYCHIATRY & NEUROLOGY

## 2023-11-22 RX ORDER — KETOROLAC TROMETHAMINE 30 MG/ML
30 INJECTION, SOLUTION INTRAMUSCULAR; INTRAVENOUS
Status: COMPLETED | OUTPATIENT
Start: 2023-11-22 | End: 2023-11-22

## 2023-11-22 RX ADMIN — KETOROLAC TROMETHAMINE 30 MG: 30 INJECTION, SOLUTION INTRAMUSCULAR; INTRAVENOUS at 09:11

## 2023-11-22 NOTE — PROCEDURES
ProceduresBOTOX PROCEDURE    PROCEDURE PERFORMED: Botulinum toxin injection (38472)    CLINICAL INDICATION: G43.719    A time out was conducted just before the start of the procedure to verify the correct patient and procedure, procedure location, and all relevant critical information.   The patient meets criteria for chronic headaches according to the ICHD-II, the patient has more than 15 headaches a month out of at least 8 are migraines which last for more than 4 hours a day.    The Botox injections have achieved well over 50%  improvement in the patient's symptoms. Migraines have been reduced at least 7 days per month and the number of cumulative hours suffering with headaches has been reduced at least 100 total hours per month. Today she does have a headache indicating that the Botox has worn off. Frequency of treatment is every 3 months unless no response to the treatments, at which time we will discontinue the injections.      DESCRIPTION OF PROCEDURE: After obtaining informed consent and under aseptic technique, a total of 175 units of botulinum toxin type A were injected in the following muscles: Procerus 5 units,  5 units bilaterally, frontalis 20 units, temporalis 20 units bilaterally, occipitalis 15 units, upper cervical paraspinals 10 units bilaterally, masseters 10 units bilaterally and trapezius 15 units bilaterally. The patient was given a total of 175 units in 31 sites.The patient tolerated the procedure well. There were no complications. The patient was given a prescription for repeat treatment in 3 months.     Unavoidable waste 25 units    The patient was added to the clinic because of protracted, severe headache.  I injected 30 mg of IV Toradol  very slow push.      Hemanth Kowalski M.D  Medical Director, Headache and Facial Pain  [facility]

## 2023-11-27 ENCOUNTER — OFFICE VISIT (OUTPATIENT)
Dept: URGENT CARE | Facility: CLINIC | Age: 58
End: 2023-11-27
Payer: COMMERCIAL

## 2023-11-27 VITALS
BODY MASS INDEX: 23.9 KG/M2 | HEIGHT: 64 IN | DIASTOLIC BLOOD PRESSURE: 94 MMHG | OXYGEN SATURATION: 99 % | TEMPERATURE: 98 F | SYSTOLIC BLOOD PRESSURE: 165 MMHG | HEART RATE: 96 BPM | WEIGHT: 140 LBS | RESPIRATION RATE: 18 BRPM

## 2023-11-27 DIAGNOSIS — T14.8XXA ANIMAL BITE: Primary | ICD-10-CM

## 2023-11-27 PROCEDURE — 99203 OFFICE O/P NEW LOW 30 MIN: CPT | Mod: S$GLB,,, | Performed by: NURSE PRACTITIONER

## 2023-11-27 PROCEDURE — 99203 PR OFFICE/OUTPT VISIT, NEW, LEVL III, 30-44 MIN: ICD-10-PCS | Mod: S$GLB,,, | Performed by: NURSE PRACTITIONER

## 2023-11-27 RX ORDER — MUPIROCIN 20 MG/G
OINTMENT TOPICAL 3 TIMES DAILY
Qty: 1 EACH | Refills: 0 | Status: SHIPPED | OUTPATIENT
Start: 2023-11-27 | End: 2023-12-04

## 2023-11-27 RX ORDER — DOXYCYCLINE 100 MG/1
100 CAPSULE ORAL 2 TIMES DAILY
Qty: 20 CAPSULE | Refills: 0 | Status: SHIPPED | OUTPATIENT
Start: 2023-11-27 | End: 2023-12-07

## 2023-11-27 NOTE — PROGRESS NOTES
Subjective:      Patient ID: Mayra Smart is a 58 y.o. female.    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  Date of initial injury: 11/27/2023   Description of injury: Possum bit just above her right wrist  What have you taken OTC for your symptoms: applied neosporin topically  What is your current pain scale out of 10? 3    If this is a return visit, do you feel that you have improved since your initial injury? N/A    Animal Bite   The incident occurred just prior to arrival. Pertinent negatives include no chest pain, no nausea, no vomiting and no cough.       Constitution: Negative. Negative for chills, sweating and fatigue.   HENT: Negative.  Negative for ear pain, facial swelling, congestion and sore throat.    Neck: Negative for painful lymph nodes.   Cardiovascular: Negative.  Negative for chest trauma, chest pain and sob on exertion.   Eyes: Negative.  Negative for eye itching and eye pain.   Respiratory: Negative.  Negative for chest tightness, cough and asthma.    Gastrointestinal: Negative.  Negative for nausea, vomiting and diarrhea.   Endocrine: negative. cold intolerance and excessive thirst.   Genitourinary: Negative.  Negative for dysuria, frequency, urgency and hematuria.   Musculoskeletal:  Negative for pain, trauma and joint pain.   Skin: Negative.  Positive for puncture wound. Negative for rash, wound and hives.   Allergic/Immunologic: Negative.  Negative for eczema, asthma, hives and itching.   Neurological: Negative.  Negative for disorientation and altered mental status.   Hematologic/Lymphatic: Negative.  Negative for swollen lymph nodes.   Psychiatric/Behavioral: Negative.  Negative for altered mental status, disorientation and confusion.      Objective:     Physical Exam  Constitutional:       General: She is not in acute distress.     Appearance: Normal appearance. She is not ill-appearing, toxic-appearing or  diaphoretic.   HENT:      Head: Normocephalic and atraumatic.   Pulmonary:      Effort: Pulmonary effort is normal. No respiratory distress.      Breath sounds: Normal breath sounds. No stridor. No wheezing, rhonchi or rales.   Chest:      Chest wall: No tenderness.   Musculoskeletal:      Cervical back: Normal range of motion.   Skin:         Neurological:      General: No focal deficit present.      Mental Status: She is alert. Mental status is at baseline.   Psychiatric:         Mood and Affect: Mood normal.         Thought Content: Thought content normal.         Judgment: Judgment normal.          Assessment:      1. Animal bite      Plan:   Follow-up with occupational health as scheduled tomorrow.    Medications Ordered This Encounter   Medications    doxycycline (VIBRAMYCIN) 100 MG Cap     Sig: Take 1 capsule (100 mg total) by mouth 2 (two) times daily. for 10 days     Dispense:  20 capsule     Refill:  0    mupirocin (BACTROBAN) 2 % ointment     Sig: Apply topically 3 (three) times daily. for 7 days     Dispense:  1 each     Refill:  0            Follow up if symptoms worsen or fail to improve, for PLEASE CONTACT PCP OR CONTACT THE EMERGENCY ROOM..

## 2023-11-27 NOTE — LETTER
Urgent Care - [place] [address], [address]  [facility] [address]  Phone: [phone]  Fax: [phone]  [facility]: [phone]    Pt Name: Mayra Mart Date: 11/27/2023   Employee ID: [id number] Date of First Treatment: 11/27/2023   Company: [employer]      Appointment Time: 04:35 PM Arrived:  04:50pm   Provider: Tadeo Diallo NP Time Out: 06:25pm     Office Treatment:   1. Animal bite      Medications Ordered This Encounter   Medications    doxycycline (VIBRAMYCIN) 100 MG Cap    mupirocin (BACTROBAN) 2 % ointment            Restrictions: Do not return to work until follow up appt tomorrow with Dr. Hollins for further evaluation.     Return Appointment: 11/28/2023 at 3:30pm

## 2023-11-28 ENCOUNTER — OFFICE VISIT (OUTPATIENT)
Dept: URGENT CARE | Facility: CLINIC | Age: 58
End: 2023-11-28
Payer: COMMERCIAL

## 2023-11-28 VITALS
RESPIRATION RATE: 16 BRPM | HEART RATE: 97 BPM | HEIGHT: 64 IN | BODY MASS INDEX: 24.24 KG/M2 | WEIGHT: 142 LBS | TEMPERATURE: 98 F | OXYGEN SATURATION: 99 % | SYSTOLIC BLOOD PRESSURE: 136 MMHG | DIASTOLIC BLOOD PRESSURE: 87 MMHG

## 2023-11-28 DIAGNOSIS — Z02.6 ENCOUNTER RELATED TO WORKER'S COMPENSATION CLAIM: Primary | ICD-10-CM

## 2023-11-28 DIAGNOSIS — T14.8XXA ANIMAL BITE: ICD-10-CM

## 2023-11-28 PROCEDURE — 99203 OFFICE O/P NEW LOW 30 MIN: CPT | Mod: S$GLB,,, | Performed by: FAMILY MEDICINE

## 2023-11-28 PROCEDURE — 99203 PR OFFICE/OUTPT VISIT, NEW, LEVL III, 30-44 MIN: ICD-10-PCS | Mod: S$GLB,,, | Performed by: FAMILY MEDICINE

## 2023-11-28 NOTE — LETTER
Urgent Care - [place] [address], [address]  [facility] [address]  Phone: [phone]  Fax: [phone]  [facility]: [phone]    Pt Name: Mayra Mart Date: 11/27/2023   Employee ID: [id number] Date of Treatment: 11/28/2023   Company: [employer]      Appointment Time: 03:15 PM Arrived: 329 pm   Provider: Art Hollins MD Time Out:420 pm     Office Treatment:   1. Encounter related to worker's compensation claim    2. Animal bite               Restrictions: Regular Duty, Discharged from Occupational Health     Return Appointment: as  needed

## 2023-11-28 NOTE — PROGRESS NOTES
Subjective:      Patient ID: Mayra Smart is a 58 y.o. female.    Chief Complaint: Animal Bite    Patient's place of employment - [employer]  Patient's job title - barn director  Date of Injury - 11/27/2023  Body part injured - just right above right wrist  Current work status per last visit - Do not return to work until follow up appt tomorrow with Dr. Hollins for further evaluation.  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  Date of initial injury: 11/27/2023   Description of injury: Possum bit just above her right wrist  What have you taken OTC for your symptoms: applied neosporin topically  What is your current pain scale out of 10? 3    If this is a return visit, do you feel that you have improved since your initial injury? N/A      Other  This is a new problem. The current episode started yesterday. The problem occurs constantly. The problem has been unchanged. Nothing aggravates the symptoms. She has tried nothing for the symptoms. The treatment provided no relief.     ROS  Objective:     Physical Exam   Wound to right forearm. Swelling,. Tenderness. FROM  No cellulitis appreciated.   Assessment:      1. Encounter related to worker's compensation claim    2. Animal bite      Plan:     Return if needed         Restrictions: Regular Duty  No follow-ups on file.

## 2023-12-18 ENCOUNTER — LAB VISIT (OUTPATIENT)
Dept: LAB | Facility: HOSPITAL | Age: 58
End: 2023-12-18
Attending: INTERNAL MEDICINE
Payer: COMMERCIAL

## 2023-12-18 DIAGNOSIS — L64.9 ANDROGENETIC ALOPECIA: ICD-10-CM

## 2023-12-18 DIAGNOSIS — I10 ESSENTIAL HYPERTENSION, MALIGNANT: ICD-10-CM

## 2023-12-18 DIAGNOSIS — I49.3 PVC'S (PREMATURE VENTRICULAR CONTRACTIONS): ICD-10-CM

## 2023-12-18 DIAGNOSIS — R00.2 PALPITATIONS: ICD-10-CM

## 2023-12-18 LAB
BASOPHILS # BLD AUTO: 0.05 K/UL (ref 0–0.2)
BASOPHILS NFR BLD: 1.4 % (ref 0–1.9)
DIFFERENTIAL METHOD: ABNORMAL
EOSINOPHIL # BLD AUTO: 0.3 K/UL (ref 0–0.5)
EOSINOPHIL NFR BLD: 7.5 % (ref 0–8)
ERYTHROCYTE [DISTWIDTH] IN BLOOD BY AUTOMATED COUNT: 13.2 % (ref 11.5–14.5)
HCT VFR BLD AUTO: 36.8 % (ref 37–48.5)
HGB BLD-MCNC: 12.5 G/DL (ref 12–16)
IMM GRANULOCYTES # BLD AUTO: 0.01 K/UL (ref 0–0.04)
IMM GRANULOCYTES NFR BLD AUTO: 0.3 % (ref 0–0.5)
LYMPHOCYTES # BLD AUTO: 0.9 K/UL (ref 1–4.8)
LYMPHOCYTES NFR BLD: 25.3 % (ref 18–48)
MCH RBC QN AUTO: 29.9 PG (ref 27–31)
MCHC RBC AUTO-ENTMCNC: 34 G/DL (ref 32–36)
MCV RBC AUTO: 88 FL (ref 82–98)
MONOCYTES # BLD AUTO: 0.4 K/UL (ref 0.3–1)
MONOCYTES NFR BLD: 10.3 % (ref 4–15)
NEUTROPHILS # BLD AUTO: 1.9 K/UL (ref 1.8–7.7)
NEUTROPHILS NFR BLD: 55.2 % (ref 38–73)
NRBC BLD-RTO: 0 /100 WBC
PLATELET # BLD AUTO: 323 K/UL (ref 150–450)
PMV BLD AUTO: 9.5 FL (ref 9.2–12.9)
RBC # BLD AUTO: 4.18 M/UL (ref 4–5.4)
T4 FREE SERPL-MCNC: 0.72 NG/DL (ref 0.71–1.51)
TSH SERPL DL<=0.005 MIU/L-ACNC: 0.77 UIU/ML (ref 0.4–4)
WBC # BLD AUTO: 3.48 K/UL (ref 3.9–12.7)

## 2023-12-18 PROCEDURE — 36415 COLL VENOUS BLD VENIPUNCTURE: CPT | Mod: PO | Performed by: INTERNAL MEDICINE

## 2023-12-18 PROCEDURE — 84443 ASSAY THYROID STIM HORMONE: CPT | Performed by: INTERNAL MEDICINE

## 2023-12-18 PROCEDURE — 84439 ASSAY OF FREE THYROXINE: CPT | Performed by: INTERNAL MEDICINE

## 2023-12-18 PROCEDURE — 85025 COMPLETE CBC W/AUTO DIFF WBC: CPT | Performed by: INTERNAL MEDICINE

## 2023-12-27 PROBLEM — M54.2 NECK PAIN: Status: ACTIVE | Noted: 2023-12-27

## 2023-12-27 PROBLEM — M25.512 SHOULDER PAIN, BILATERAL: Status: ACTIVE | Noted: 2023-12-27

## 2023-12-27 PROBLEM — M25.511 SHOULDER PAIN, BILATERAL: Status: ACTIVE | Noted: 2023-12-27

## 2023-12-27 PROBLEM — R29.898 DECREASED RANGE OF MOTION OF NECK: Status: ACTIVE | Noted: 2023-12-27

## 2024-01-26 ENCOUNTER — INFUSION (OUTPATIENT)
Dept: INFUSION THERAPY | Facility: HOSPITAL | Age: 59
End: 2024-01-26
Attending: PSYCHIATRY & NEUROLOGY
Payer: COMMERCIAL

## 2024-01-26 VITALS
SYSTOLIC BLOOD PRESSURE: 104 MMHG | BODY MASS INDEX: 24.24 KG/M2 | TEMPERATURE: 98 F | WEIGHT: 142 LBS | RESPIRATION RATE: 18 BRPM | HEART RATE: 87 BPM | HEIGHT: 64 IN | DIASTOLIC BLOOD PRESSURE: 70 MMHG

## 2024-01-26 DIAGNOSIS — G43.719 INTRACTABLE CHRONIC MIGRAINE WITHOUT AURA AND WITHOUT STATUS MIGRAINOSUS: Primary | ICD-10-CM

## 2024-01-26 PROCEDURE — 63600175 PHARM REV CODE 636 W HCPCS: Mod: JZ,JG,PN | Performed by: PSYCHIATRY & NEUROLOGY

## 2024-01-26 PROCEDURE — 25000003 PHARM REV CODE 250: Mod: PN | Performed by: PSYCHIATRY & NEUROLOGY

## 2024-01-26 PROCEDURE — 96365 THER/PROPH/DIAG IV INF INIT: CPT | Mod: PN

## 2024-01-26 RX ORDER — METHYLPREDNISOLONE SOD SUCC 125 MG
125 VIAL (EA) INJECTION ONCE AS NEEDED
Status: CANCELLED | OUTPATIENT
Start: 2024-01-26

## 2024-01-26 RX ORDER — ONDANSETRON HYDROCHLORIDE 2 MG/ML
8 INJECTION, SOLUTION INTRAVENOUS ONCE AS NEEDED
Status: CANCELLED | OUTPATIENT
Start: 2024-01-26

## 2024-01-26 RX ORDER — SODIUM CHLORIDE 0.9 % (FLUSH) 0.9 %
10 SYRINGE (ML) INJECTION
Status: CANCELLED | OUTPATIENT
Start: 2024-01-26

## 2024-01-26 RX ORDER — SODIUM CHLORIDE 9 MG/ML
INJECTION, SOLUTION INTRAVENOUS ONCE AS NEEDED
Status: CANCELLED | OUTPATIENT
Start: 2024-01-26

## 2024-01-26 RX ORDER — ACETAMINOPHEN 500 MG
1000 TABLET ORAL ONCE AS NEEDED
Status: CANCELLED | OUTPATIENT
Start: 2024-01-26

## 2024-01-26 RX ORDER — DIPHENHYDRAMINE HYDROCHLORIDE 50 MG/ML
25 INJECTION INTRAMUSCULAR; INTRAVENOUS ONCE AS NEEDED
Status: CANCELLED | OUTPATIENT
Start: 2024-01-26

## 2024-01-26 RX ORDER — SODIUM CHLORIDE 0.9 % (FLUSH) 0.9 %
10 SYRINGE (ML) INJECTION
Status: DISCONTINUED | OUTPATIENT
Start: 2024-01-26 | End: 2024-01-26 | Stop reason: HOSPADM

## 2024-01-26 RX ADMIN — SODIUM CHLORIDE: 9 INJECTION, SOLUTION INTRAVENOUS at 03:01

## 2024-01-26 RX ADMIN — EPTINEZUMAB-JJMR 300 MG: 100 INJECTION INTRAVENOUS at 03:01

## 2024-02-23 ENCOUNTER — PROCEDURE VISIT (OUTPATIENT)
Dept: NEUROLOGY | Facility: CLINIC | Age: 59
End: 2024-02-23
Payer: COMMERCIAL

## 2024-02-23 ENCOUNTER — TELEPHONE (OUTPATIENT)
Dept: NEUROLOGY | Facility: CLINIC | Age: 59
End: 2024-02-23

## 2024-02-23 ENCOUNTER — PATIENT MESSAGE (OUTPATIENT)
Dept: NEUROLOGY | Facility: CLINIC | Age: 59
End: 2024-02-23

## 2024-02-23 VITALS
BODY MASS INDEX: 24.24 KG/M2 | RESPIRATION RATE: 17 BRPM | WEIGHT: 142 LBS | HEIGHT: 64 IN | TEMPERATURE: 98 F | SYSTOLIC BLOOD PRESSURE: 116 MMHG | HEART RATE: 83 BPM | DIASTOLIC BLOOD PRESSURE: 72 MMHG

## 2024-02-23 DIAGNOSIS — M47.812 CERVICAL SPONDYLOSIS: Primary | ICD-10-CM

## 2024-02-23 DIAGNOSIS — G43.719 INTRACTABLE CHRONIC MIGRAINE WITHOUT AURA AND WITHOUT STATUS MIGRAINOSUS: ICD-10-CM

## 2024-02-23 DIAGNOSIS — G43.719 INTRACTABLE CHRONIC MIGRAINE WITHOUT AURA AND WITHOUT STATUS MIGRAINOSUS: Primary | ICD-10-CM

## 2024-02-23 PROCEDURE — 64615 CHEMODENERV MUSC MIGRAINE: CPT | Mod: S$GLB,,, | Performed by: PSYCHIATRY & NEUROLOGY

## 2024-02-23 RX ORDER — ZAVEGEPANT 10 MG/.1ML
1 SPRAY NASAL DAILY PRN
Qty: 6 EACH | Refills: 6 | Status: SHIPPED | OUTPATIENT
Start: 2024-02-23

## 2024-02-23 NOTE — PROCEDURES
ProceduresBOTOX PROCEDURE    PROCEDURE PERFORMED: Botulinum toxin injection (31970)    CLINICAL INDICATION: G43.719    A time out was conducted just before the start of the procedure to verify the correct patient and procedure, procedure location, and all relevant critical information.   The patient meets criteria for chronic headaches according to the ICHD-II, the patient has more than 15 headaches a month out of at least 8 are migraines which last for more than 4 hours a day.    The Botox injections have achieved well over 50%  improvement in the patient's symptoms. Migraines have been reduced at least 7 days per month and the number of cumulative hours suffering with headaches has been reduced at least 100 total hours per month. Today she does have a headache indicating that the Botox has worn off. Frequency of treatment is every 3 months unless no response to the treatments, at which time we will discontinue the injections.      DESCRIPTION OF PROCEDURE: After obtaining informed consent and under aseptic technique, a total of 175 units of botulinum toxin type A were injected in the following muscles: Procerus 5 units,  5 units bilaterally, frontalis 20 units, temporalis 20 units bilaterally, occipitalis 15 units, upper cervical paraspinals 10 units bilaterally, masseters 10 units bilaterally and trapezius 15 units bilaterally. The patient was given a total of 175 units in 31 sites.The patient tolerated the procedure well. There were no complications. The patient was given a prescription for repeat treatment in 3 months.     Unavoidable waste 25 units    The patient was added to the clinic because of protracted, severe headache.  I injected 30 mg of IV Toradol  very slow push.      Hemanth Kowalski M.D  Medical Director, Headache and Facial Pain  [facility]

## 2024-02-28 ENCOUNTER — OFFICE VISIT (OUTPATIENT)
Dept: PAIN MEDICINE | Facility: CLINIC | Age: 59
End: 2024-02-28
Payer: COMMERCIAL

## 2024-02-28 ENCOUNTER — HOSPITAL ENCOUNTER (OUTPATIENT)
Dept: RADIOLOGY | Facility: HOSPITAL | Age: 59
Discharge: HOME | End: 2024-02-28
Attending: PHYSICIAN ASSISTANT
Payer: COMMERCIAL

## 2024-02-28 VITALS
WEIGHT: 142 LBS | BODY MASS INDEX: 24.24 KG/M2 | HEIGHT: 64 IN | DIASTOLIC BLOOD PRESSURE: 72 MMHG | SYSTOLIC BLOOD PRESSURE: 116 MMHG | HEART RATE: 83 BPM

## 2024-02-28 DIAGNOSIS — M54.12 CERVICAL RADICULOPATHY: Primary | ICD-10-CM

## 2024-02-28 DIAGNOSIS — M47.812 CERVICAL SPONDYLOSIS: ICD-10-CM

## 2024-02-28 DIAGNOSIS — M54.2 CERVICALGIA: ICD-10-CM

## 2024-02-28 DIAGNOSIS — M54.12 CERVICAL RADICULOPATHY: ICD-10-CM

## 2024-02-28 PROCEDURE — 3008F BODY MASS INDEX DOCD: CPT | Mod: CPTII,S$GLB,, | Performed by: PHYSICIAN ASSISTANT

## 2024-02-28 PROCEDURE — 1159F MED LIST DOCD IN RCRD: CPT | Mod: CPTII,S$GLB,, | Performed by: PHYSICIAN ASSISTANT

## 2024-02-28 PROCEDURE — 72052 X-RAY EXAM NECK SPINE 6/>VWS: CPT | Mod: TC,PO

## 2024-02-28 PROCEDURE — 99203 OFFICE O/P NEW LOW 30 MIN: CPT | Mod: S$GLB,,, | Performed by: PHYSICIAN ASSISTANT

## 2024-02-28 PROCEDURE — 99999 PR PBB SHADOW E&M-EST. PATIENT-LVL V: CPT | Mod: PBBFAC,,, | Performed by: PHYSICIAN ASSISTANT

## 2024-02-28 PROCEDURE — 3074F SYST BP LT 130 MM HG: CPT | Mod: CPTII,S$GLB,, | Performed by: PHYSICIAN ASSISTANT

## 2024-02-28 PROCEDURE — 3078F DIAST BP <80 MM HG: CPT | Mod: CPTII,S$GLB,, | Performed by: PHYSICIAN ASSISTANT

## 2024-02-28 PROCEDURE — 1160F RVW MEDS BY RX/DR IN RCRD: CPT | Mod: CPTII,S$GLB,, | Performed by: PHYSICIAN ASSISTANT

## 2024-02-28 PROCEDURE — 72052 X-RAY EXAM NECK SPINE 6/>VWS: CPT | Mod: 26,,, | Performed by: RADIOLOGY

## 2024-02-28 NOTE — PROGRESS NOTES
"[facility] New Patient Evaluation      Referred by: Dr. Noelle Kowalski    PCP:   Doris Myers MD    CC:   Chief Complaint   Patient presents with    Neck Pain     Radiating down bilaterally           HPI:   Mayra Smart is a 58 y.o. year old female patient who has a past medical history of Back pain, Bell's palsy, Breast disorder, Fibroids, GERD (gastroesophageal reflux disease), Headache, History of anesthesia reaction, Neck pain, Pelvic pain in female, and Preeclampsia. She presents in referral from Dr. Noelle Kowalski for neck pain.  She hs neck and lower back pain, but neck is bothering her the most right now.  She describes years of pain "all of my adult life" in the neck.  Pain is felt in the posterior neck to the bilateral arms and hands.  She has been treated with PT for shoulder and neck pain recently with no significant improvement.  She has had shoulder steroid injections, but no neck CARMEN or trigger point injections.  Neck and arm pain are associated with numbness and tingling.     Denies bowel/ bladder incontinence.    Past and current medications:  Antineuropathics:  NSAIDs:  mobic  Antidepressants:  Muscle relaxers:  Opioids:  Antiplatelets/Anticoagulants:    Physical Therapy/ Chiropractic care:  PT  - for shoulder and neck pain 12/27/23 through 1/22/24    Pain Intervention History:  none    Past Spine Surgical History:  none        History:    Current Outpatient Medications:     atorvastatin (LIPITOR) 10 MG tablet, TAKE 1 TABLET(10 MG) BY MOUTH EVERY DAY, Disp: 30 tablet, Rfl: 5    budesonide-formoterol 160-4.5 mcg (SYMBICORT) 160-4.5 mcg/actuation HFAA, Inhale 2 puffs into the lungs every 12 (twelve) hours. Controller, Disp: 10 g, Rfl: 5    eletriptan (RELPAX) 40 MG tablet, TAKE 1 TABLET BY MOUTH AS NEEDED, MAY REPEAT IN 2 HOURS IF NECESSARY, Disp: 12 tablet, Rfl: 11    EPINEPHrine (EPIPEN) 0.3 mg/0.3 mL AtIn, Inject 0.3 mLs (0.3 mg total) into the muscle once as " "needed., Disp: 2 Device, Rfl: 3    lasmiditan (REYVOW) tablet 100 mg, Take 1 tablet (100 mg) by mouth once daily as needed (migraine)., Disp: 8 tablet, Rfl: 11    meloxicam (MOBIC) 15 MG tablet, Take 1 tablet (15 mg total) by mouth once daily., Disp: 90 tablet, Rfl: 1    MINIVELLE 0.05 mg/24 hr, Place 1 patch onto the skin twice a week., Disp: , Rfl:     minoxidiL (LONITEN) 2.5 MG tablet, Take 1 tablet (2.5 mg total) by mouth once daily., Disp: 90 tablet, Rfl: 1    progesterone (PROMETRIUM) 100 MG capsule, TK 1 C PO QHS, Disp: , Rfl: 6    promethazine (PHENERGAN) 25 MG tablet, Take 1 tablet (25 mg total) by mouth every 8 (eight) hours as needed for Nausea., Disp: 30 tablet, Rfl: 11    spironolactone (ALDACTONE) 25 MG tablet, Take 1 tablet (25 mg total) by mouth once daily., Disp: 90 tablet, Rfl: 4    thyroid, pork, (NP THYROID) 60 mg Tab, Take 1 tablet by mouth once a day, Disp: 90 tablet, Rfl: 3    VENTOLIN HFA 90 mcg/actuation inhaler, , Disp: , Rfl:     zavegepant (ZAVZPRET) 10 mg/actuation Spry, 1 spray by Nasal route daily as needed (migraine)., Disp: 6 each, Rfl: 6    Current Facility-Administered Medications:     onabotulinumtoxina injection 200 Units, 200 Units, Intramuscular, Q90 Days, Noelle Kowalski MD, 200 Units at 09/06/23 0842    onabotulinumtoxina injection 200 Units, 200 Units, Intramuscular, Q90 Days, Noelle Kowalski MD, 200 Units at 11/22/23 0902    onabotulinumtoxina injection 200 Units, 200 Units, Intramuscular, Q90 Days, Noelle Kowalski MD, 200 Units at 02/23/24 1023    Past Medical History:   Diagnosis Date    Back pain     Bell's palsy     Breast disorder     fibercystic tissue    Fibroids     uterine    GERD (gastroesophageal reflux disease)     Headache     History of anesthesia reaction     sensitive to anesthesia, " Major hangover"    Neck pain     Pelvic pain in female     left lower quadrant    Preeclampsia        Past Surgical History:   Procedure Laterality Date    "  SECTION      times 2    COLONOSCOPY      x2    COLONOSCOPY N/A 2023    Procedure: COLONOSCOPY;  Surgeon: Lloyd Fleming MD;  Location: [facility];  Service: Gastroenterology;  Laterality: N/A;    DILATION AND CURETTAGE OF UTERUS  2007    ENDOMETRIAL ABLATION  10/22/2008    BTL    HYSTERECTOMY      OOPHORECTOMY      REDUCTION OF BOTH BREASTS         Family History   Problem Relation Age of Onset    Pulmonary embolism Mother     Skin cancer Mother     Skin cancer Father     Breast cancer Neg Hx     Ovarian cancer Neg Hx        Social History     Socioeconomic History    Marital status:    Tobacco Use    Smoking status: Never     Passive exposure: Never    Smokeless tobacco: Never   Substance and Sexual Activity    Alcohol use: Yes     Comment: ocassionally    Drug use: No    Sexual activity: Yes     Social Determinants of Health     Financial Resource Strain: Low Risk  (2024)    Overall Financial Resource Strain (CARDIA)     Difficulty of Paying Living Expenses: Not very hard   Food Insecurity: No Food Insecurity (2024)    Hunger Vital Sign     Worried About Running Out of Food in the Last Year: Never true     Ran Out of Food in the Last Year: Never true   Transportation Needs: No Transportation Needs (2024)    PRAPARE - Transportation     Lack of Transportation (Medical): No     Lack of Transportation (Non-Medical): No   Physical Activity: Insufficiently Active (2024)    Exercise Vital Sign     Days of Exercise per Week: 3 days     Minutes of Exercise per Session: 30 min   Stress: No Stress Concern Present (2024)    [employer] - Occupational Stress Questionnaire     Feeling of Stress : Not at all   Social Connections: Unknown (2024)    Social Connection and Isolation Panel [NHANES]     Frequency of Communication with Friends and Family: Three times a week     Frequency of Social Gatherings with Friends and Family: Once a week  "    Active Member of Clubs or Organizations: Yes     Attends Club or Organization Meetings: More than 4 times per year     Marital Status:    Housing Stability: Low Risk  (2/23/2024)    Housing Stability Vital Sign     Unable to Pay for Housing in the Last Year: No     Number of Places Lived in the Last Year: 1     Unstable Housing in the Last Year: No       Review of patient's allergies indicates:   Allergen Reactions    Cephalosporins Anaphylaxis    Penicillins Anaphylaxis    Codeine Itching and Swelling    Opioids - morphine analogues      Vertigo, nausea, itchy throat, tongue enlgmt, rash    Vicoprofen [hydrocodone-ibuprofen] Other (See Comments)     Severe nausea       Labs:  Lab Results   Component Value Date    HGBA1C 5.4 01/11/2023       Lab Results   Component Value Date    WBC 3.48 (L) 12/18/2023    HGB 12.5 12/18/2023    HCT 36.8 (L) 12/18/2023    MCV 88 12/18/2023     12/18/2023           Review of Systems:  Neck pain.  Arm pain.  Numbenss.  Tingling.  Low back pain..  Balance of review of systems is negative.    Physical Exam:  Vitals:    02/28/24 1517   BP: 116/72   Pulse: 83   Weight: 64.4 kg (141 lb 15.6 oz)   Height: 5' 4" (1.626 m)   PainSc:   3   PainLoc: Neck     Body mass index is 24.37 kg/m².    Pain disability index:       No data to display                Gen: NAD  Psych: mood appropriate for given condition  HEENT: eyes anicteric   CV: RRR, 2+ radial pulse  Respiratory: non-labored, no signs of respiratory distress  Abd: non-distended  Skin: warm, dry and intact.  Gait: Able to heel walk, toe walk. No antalgic gait.     Coordination:   Tandem walking coordination: normal    Cervical spine: ROM is full in flexion, extension and lateral rotation without increased pain.  Spurling's maneuver causes neck pain to both sides.  Myofascial exam: No Tenderness to palpation across cervical paraspinous region bilaterally.    Lumbar spine:  Lumbar spine: ROM is full with flexion extension " and oblique extension with no increased pain.    Zeferino's test causes no increased pain on either side.    Supine straight leg raise is negative bilaterally.    Internal and external rotation of the hip causes no increased pain on either side.  Myofascial exam: No tenderness to palpation across lumbar paraspinous muscles. No tenderness to palpation over the bilateral greater trochanters and bilateral SI joint    Sensory:  Intact and symmetrical to light touch in C4-T1 dermatomes bilaterally. Intact and symmetrical to light touch in L1-S1 dermatomes bilaterally.    Motor:    Right Left   C4 Shoulder Abduction  5  5   C5 Elbow Flexion    5  5   C6 Wrist Extension  5  5   C7 Elbow Extension   5  5   C8/T1 Hand Intrinsics   5  5        Right Left   L2/3 Iliacus Hip flexion  5  5   L3/4 Qudratus Femoris Knee Extension  5  5   L4/5 Tib Anterior Ankle Dorsiflexion   5  5   L5/S1 Extensor Hallicus Longus Great toe extension  5  5   S1/S2 Gastroc/Soleus Plantar Flexion  5  5      Right Left   Triceps DTR 2+ 2+   Biceps DTR 2+ 2+   Brachioradialis DTR 2+ 2+   Patellar DTR 2+ 2+   Achilles DTR 2+ 2+   Harris Absent  Absent   Clonus Absent Absent   Babinski Absent Absent       Imaging:  No spine iamging.       Assessment:   Mayra Smart is a 58 y.o. year old female patient who has a past medical history of Back pain, Bell's palsy, Breast disorder, Fibroids, GERD (gastroesophageal reflux disease), Headache, History of anesthesia reaction, Neck pain, Pelvic pain in female, and Preeclampsia. She presents in referral from Dr. Noelle Kowalski for neck pain.    Posterior neck and arm pain to the hands.  Myofascial pain vs cervical radiculoathy.  No improvement with nsiads or PT.  No response to shoulder treatmetns.    Plan:  - recommend xray and MRI to further assess for neural compressio in the neck as the source of pain.  - follow up after imaging.     Problem List Items Addressed This Visit    None  Visit Diagnoses        Cervical radiculopathy    -  Primary    Relevant Orders    X-Ray Cervical Spine 5 View W Flex Extxt (Completed)    MRI Cervical Spine Without Contrast    Cervical spondylosis        Relevant Orders    X-Ray Cervical Spine 5 View W Flex Extxt (Completed)    MRI Cervical Spine Without Contrast    Cervicalgia        Relevant Orders    X-Ray Cervical Spine 5 View W Flex Extxt (Completed)    MRI Cervical Spine Without Contrast            Follow Up: RTC after imaging.    : Reviewed and consistent with medication use as prescribed.    Thank you for referring this interesting patient, and I look forward to continuing to collaborate in her care.        Jazmyne He PA-C  [facility]

## 2024-03-01 ENCOUNTER — PATIENT MESSAGE (OUTPATIENT)
Dept: NEUROLOGY | Facility: CLINIC | Age: 59
End: 2024-03-01
Payer: COMMERCIAL

## 2024-03-02 DIAGNOSIS — G43.719 INTRACTABLE CHRONIC MIGRAINE WITHOUT AURA AND WITHOUT STATUS MIGRAINOSUS: ICD-10-CM

## 2024-03-02 RX ORDER — ELETRIPTAN HYDROBROMIDE 40 MG/1
TABLET, FILM COATED ORAL
Qty: 12 TABLET | Refills: 11 | Status: SHIPPED | OUTPATIENT
Start: 2024-03-02 | End: 2024-03-08 | Stop reason: SDUPTHER

## 2024-03-08 DIAGNOSIS — G43.719 INTRACTABLE CHRONIC MIGRAINE WITHOUT AURA AND WITHOUT STATUS MIGRAINOSUS: ICD-10-CM

## 2024-03-08 RX ORDER — ELETRIPTAN HYDROBROMIDE 40 MG/1
TABLET, FILM COATED ORAL
Qty: 12 TABLET | Refills: 11 | Status: SHIPPED | OUTPATIENT
Start: 2024-03-08 | End: 2024-03-12

## 2024-03-15 ENCOUNTER — HOSPITAL ENCOUNTER (OUTPATIENT)
Dept: RADIOLOGY | Facility: HOSPITAL | Age: 59
Discharge: HOME | End: 2024-03-15
Attending: PHYSICIAN ASSISTANT
Payer: COMMERCIAL

## 2024-03-15 DIAGNOSIS — M54.2 CERVICALGIA: ICD-10-CM

## 2024-03-15 DIAGNOSIS — M47.812 CERVICAL SPONDYLOSIS: ICD-10-CM

## 2024-03-15 DIAGNOSIS — M54.12 CERVICAL RADICULOPATHY: ICD-10-CM

## 2024-03-15 PROCEDURE — 72141 MRI NECK SPINE W/O DYE: CPT | Mod: TC,PO

## 2024-03-15 PROCEDURE — 72141 MRI NECK SPINE W/O DYE: CPT | Mod: 26,,, | Performed by: RADIOLOGY

## 2024-03-20 ENCOUNTER — TELEPHONE (OUTPATIENT)
Dept: PAIN MEDICINE | Facility: CLINIC | Age: 59
End: 2024-03-20
Payer: COMMERCIAL

## 2024-03-20 NOTE — TELEPHONE ENCOUNTER
----- Message from Jazmyne He PA-C sent at 3/19/2024  1:50 PM CDT -----  She had xray and MRI.  Please schedule clinic appt to discuss results.   within normal limits

## 2024-03-22 ENCOUNTER — OFFICE VISIT (OUTPATIENT)
Dept: URGENT CARE | Facility: CLINIC | Age: 59
End: 2024-03-22
Payer: COMMERCIAL

## 2024-03-22 VITALS
SYSTOLIC BLOOD PRESSURE: 101 MMHG | HEART RATE: 106 BPM | OXYGEN SATURATION: 97 % | DIASTOLIC BLOOD PRESSURE: 75 MMHG | RESPIRATION RATE: 20 BRPM | HEIGHT: 64 IN | WEIGHT: 140 LBS | TEMPERATURE: 99 F | BODY MASS INDEX: 23.9 KG/M2

## 2024-03-22 DIAGNOSIS — J02.9 ACUTE VIRAL PHARYNGITIS: Primary | ICD-10-CM

## 2024-03-22 DIAGNOSIS — R05.9 COUGH, UNSPECIFIED TYPE: ICD-10-CM

## 2024-03-22 LAB
CTP QC/QA: YES
CTP QC/QA: YES
MOLECULAR STREP A: NEGATIVE
SARS-COV-2 AG RESP QL IA.RAPID: NEGATIVE

## 2024-03-22 PROCEDURE — 99213 OFFICE O/P EST LOW 20 MIN: CPT | Mod: S$GLB,,, | Performed by: NURSE PRACTITIONER

## 2024-03-22 PROCEDURE — 87651 STREP A DNA AMP PROBE: CPT | Mod: QW,S$GLB,, | Performed by: NURSE PRACTITIONER

## 2024-03-22 PROCEDURE — 87811 SARS-COV-2 COVID19 W/OPTIC: CPT | Mod: QW,S$GLB,, | Performed by: NURSE PRACTITIONER

## 2024-03-22 NOTE — PROGRESS NOTES
"Subjective:      Patient ID: Mayra Smart is a 58 y.o. female.    Vitals:  height is 5' 4" (1.626 m) and weight is 63.5 kg (140 lb). Her oral temperature is 98.8 °F (37.1 °C). Her blood pressure is 101/75 and her pulse is 106. Her respiration is 20 and oxygen saturation is 97%.     Chief Complaint: Sore Throat    Pt reports to clinic with cough. Pt experiencing sore throat, headache, nasal congestion, chest tightness, hard to swallow onset yesterday. Pt denies exposure and denies pain. Pt using tylenol and aspirin for symptoms at home. Pt requested covid and strep testing. Pt states that she has been exposed to covid and strep working strep. Hx of asthma    Sore Throat   This is a new problem. The problem has been unchanged. Neither side of throat is experiencing more pain than the other. There has been no fever. The fever has been present for Less than 1 day. Associated symptoms include congestion, coughing, headaches and trouble swallowing. Pertinent negatives include no diarrhea, ear pain or vomiting. She has tried nothing for the symptoms. The treatment provided no relief.       Constitution: Negative. Negative for chills, sweating and fatigue.   HENT:  Positive for congestion, sore throat and trouble swallowing. Negative for ear pain and facial swelling.    Neck: Negative for painful lymph nodes.   Cardiovascular: Negative.  Negative for chest trauma, chest pain and sob on exertion.   Eyes: Negative.  Negative for eye itching and eye pain.   Respiratory:  Positive for cough. Negative for chest tightness and asthma.    Gastrointestinal: Negative.  Negative for nausea, vomiting and diarrhea.   Endocrine: negative. cold intolerance and excessive thirst.   Genitourinary: Negative.  Negative for dysuria, frequency, urgency and hematuria.   Musculoskeletal:  Negative for pain, trauma and joint pain.   Skin: Negative.  Negative for rash, wound and hives.   Allergic/Immunologic: Negative.  Negative for eczema, " asthma, hives and itching.   Neurological:  Positive for headaches. Negative for disorientation and altered mental status.   Hematologic/Lymphatic: Negative.  Negative for swollen lymph nodes.   Psychiatric/Behavioral: Negative.  Negative for altered mental status, disorientation and confusion.       Objective:     Physical Exam   Constitutional: She is oriented to person, place, and time. She appears well-developed. She is cooperative.  Non-toxic appearance. She does not appear ill. No distress.   HENT:   Head: Normocephalic and atraumatic.   Ears:   Right Ear: Hearing, tympanic membrane, external ear and ear canal normal. impacted cerumen  Left Ear: Hearing, tympanic membrane, external ear and ear canal normal. impacted cerumen  Nose: Nose normal. No mucosal edema, rhinorrhea, nasal deformity or congestion. No epistaxis. Right sinus exhibits no maxillary sinus tenderness and no frontal sinus tenderness. Left sinus exhibits no maxillary sinus tenderness and no frontal sinus tenderness.   Mouth/Throat: Uvula is midline and mucous membranes are normal. No trismus in the jaw. Normal dentition. No uvula swelling. Posterior oropharyngeal erythema (Mild) present. No oropharyngeal exudate, posterior oropharyngeal edema, tonsillar abscesses or cobblestoning. Tonsils are 0 on the right. Tonsils are 0 on the left. No tonsillar exudate.   Eyes: Conjunctivae and lids are normal. No scleral icterus.   Neck: Trachea normal and phonation normal. Neck supple. No edema present. No erythema present. No neck rigidity present.   Cardiovascular: Normal rate, regular rhythm, normal heart sounds and normal pulses.   Pulmonary/Chest: Effort normal and breath sounds normal. No stridor. No respiratory distress. She has no decreased breath sounds. She has no wheezes. She has no rhonchi. She has no rales. She exhibits no tenderness.   Abdominal: Normal appearance. There is no abdominal tenderness.   Musculoskeletal: Normal range of motion.          General: No deformity. Normal range of motion.   Lymphadenopathy:     She has no cervical adenopathy.   Neurological: no focal deficit. She is alert, oriented to person, place, and time and at baseline. She exhibits normal muscle tone. Coordination normal.   Skin: Skin is warm, dry, intact, not diaphoretic and not pale.   Psychiatric: Her speech is normal and behavior is normal. Mood, judgment and thought content normal.   Nursing note and vitals reviewed.    Results for orders placed or performed in visit on 03/22/24   SARS Coronavirus 2 Antigen, POCT Manual Read   Result Value Ref Range    SARS Coronavirus 2 Antigen Negative Negative     Acceptable Yes    POCT Strep A, Molecular   Result Value Ref Range    Molecular Strep A, POC Negative Negative     Acceptable Yes          Assessment:     1. Acute viral pharyngitis    2. Cough, unspecified type        Plan:   FOLLOWUP  Follow up if symptoms worsen or fail to improve, for PLEASE CONTACT PCP OR CONTACT THE EMERGENCY ROOM..     PATIENT INSTRUCTIONS  Patient Instructions   INSTRUCTIONS:  - Rest.  - Drink plenty of fluids.  - Take Tylenol and/or Ibuprofen as directed as needed for fever/pain.  Do not take more than the recommended dose.  - follow up with your PCP within the next 1-2 weeks as needed.  - You must understand that you have received an Urgent Care treatment only and that you may be released before all of your medical problems are known or treated.   - You, the patient, will arrange for follow up care as instructed.   - If your condition worsens or fails to improve we recommend that you receive another evaluation at the ER immediately or contact your PCP to discuss your concerns.   - You can call [phone] or [phone] to help schedule an appointment with the appropriate provider.     -If you smoke cigarettes, it would be beneficial for you to stop.    OVER THE COUNTER RECOMMENDATIONS/SUGGESTIONS.     Make sure  to stay well hydrated.     Use Nasal Saline to mechanically move any post nasal drip from your eustachian tube or from the back of your throat.     Use warm salt water gargles to ease your throat pain. Warm salt water gargles as needed for sore throat-  1/2 tsp salt to 1 cup warm water, gargle as desired.     Use an antihistamine such as Claritin, Zyrtec or Allegra to dry you out.      Use pseudoephedrine (behind the counter) to decongest. Pseudoephedrine  30 mg up to 240 mg /day. It can raise your blood pressure and give you palpitations.     Use mucinex (guaifenisin) to break up mucous up to 2400mg/day to loosen any mucous.   The mucinex DM pill has a cough suppressant that can be sedating. It can be used at night to stop the tickle at the back of your throat.  You can use Mucinex D (it has guaifenesin and a high dose of pseudoephedrine) in the mornings to help decongest.        Use Flonase 1-2 sprays/nostril per day. It is a local acting steroid nasal spray, if you develop a bloody nose, stop using the medication immediately.     Sometimes Nyquil at night is beneficial to help you get some rest, however it is sedating and it does have an antihistamine, and tylenol.     Honey is a natural cough suppressant that can be used.     Tylenol up to 4,000 mg a day is safe for short periods and can be used for body aches, pain, and fever. However in high doses and prolonged use it can cause liver irritation.     Ibuprofen is a non-steroidal anti-inflammatory that can be used for body aches, pain, and fever.However it can also cause stomach irritation if over used.         THANK YOU FOR ALLOWING ME TO PARTICIPATE IN YOUR HEALTHCARE,     Tadeo Diallo NP     Acute viral pharyngitis    Cough, unspecified type  -     SARS Coronavirus 2 Antigen, POCT Manual Read  -     POCT Strep A, Molecular

## 2024-03-22 NOTE — PATIENT INSTRUCTIONS
INSTRUCTIONS:  - Rest.  - Drink plenty of fluids.  - Take Tylenol and/or Ibuprofen as directed as needed for fever/pain.  Do not take more than the recommended dose.  - follow up with your PCP within the next 1-2 weeks as needed.  - You must understand that you have received an Urgent Care treatment only and that you may be released before all of your medical problems are known or treated.   - You, the patient, will arrange for follow up care as instructed.   - If your condition worsens or fails to improve we recommend that you receive another evaluation at the ER immediately or contact your PCP to discuss your concerns.   - You can call [phone] or [phone] to help schedule an appointment with the appropriate provider.     -If you smoke cigarettes, it would be beneficial for you to stop.    OVER THE COUNTER RECOMMENDATIONS/SUGGESTIONS.     Make sure to stay well hydrated.     Use Nasal Saline to mechanically move any post nasal drip from your eustachian tube or from the back of your throat.     Use warm salt water gargles to ease your throat pain. Warm salt water gargles as needed for sore throat-  1/2 tsp salt to 1 cup warm water, gargle as desired.     Use an antihistamine such as Claritin, Zyrtec or Allegra to dry you out.      Use pseudoephedrine (behind the counter) to decongest. Pseudoephedrine  30 mg up to 240 mg /day. It can raise your blood pressure and give you palpitations.     Use mucinex (guaifenisin) to break up mucous up to 2400mg/day to loosen any mucous.   The mucinex DM pill has a cough suppressant that can be sedating. It can be used at night to stop the tickle at the back of your throat.  You can use Mucinex D (it has guaifenesin and a high dose of pseudoephedrine) in the mornings to help decongest.        Use Flonase 1-2 sprays/nostril per day. It is a local acting steroid nasal spray, if you develop a bloody nose, stop using the medication immediately.     Sometimes Nyquil at night  is beneficial to help you get some rest, however it is sedating and it does have an antihistamine, and tylenol.     Honey is a natural cough suppressant that can be used.     Tylenol up to 4,000 mg a day is safe for short periods and can be used for body aches, pain, and fever. However in high doses and prolonged use it can cause liver irritation.     Ibuprofen is a non-steroidal anti-inflammatory that can be used for body aches, pain, and fever.However it can also cause stomach irritation if over used.

## 2024-03-23 ENCOUNTER — OFFICE VISIT (OUTPATIENT)
Dept: URGENT CARE | Facility: CLINIC | Age: 59
End: 2024-03-23
Payer: COMMERCIAL

## 2024-03-23 VITALS
DIASTOLIC BLOOD PRESSURE: 76 MMHG | HEART RATE: 99 BPM | WEIGHT: 140 LBS | SYSTOLIC BLOOD PRESSURE: 116 MMHG | RESPIRATION RATE: 17 BRPM | TEMPERATURE: 99 F | OXYGEN SATURATION: 95 % | HEIGHT: 64 IN | BODY MASS INDEX: 23.9 KG/M2

## 2024-03-23 DIAGNOSIS — J06.9 VIRAL URI WITH COUGH: Primary | ICD-10-CM

## 2024-03-23 DIAGNOSIS — R50.9 FEVER, UNSPECIFIED FEVER CAUSE: ICD-10-CM

## 2024-03-23 DIAGNOSIS — H65.193 ACUTE EFFUSION OF BOTH MIDDLE EARS: ICD-10-CM

## 2024-03-23 LAB
CTP QC/QA: YES
MOLECULAR STREP A: NEGATIVE
POC MOLECULAR INFLUENZA A AGN: NEGATIVE
POC MOLECULAR INFLUENZA B AGN: NEGATIVE
SARS-COV-2 AG RESP QL IA.RAPID: NEGATIVE

## 2024-03-23 PROCEDURE — 87502 INFLUENZA DNA AMP PROBE: CPT | Mod: QW,S$GLB,, | Performed by: PHYSICIAN ASSISTANT

## 2024-03-23 PROCEDURE — 87651 STREP A DNA AMP PROBE: CPT | Mod: QW,S$GLB,, | Performed by: PHYSICIAN ASSISTANT

## 2024-03-23 PROCEDURE — 99214 OFFICE O/P EST MOD 30 MIN: CPT | Mod: S$GLB,,, | Performed by: PHYSICIAN ASSISTANT

## 2024-03-23 PROCEDURE — 87811 SARS-COV-2 COVID19 W/OPTIC: CPT | Mod: QW,S$GLB,, | Performed by: PHYSICIAN ASSISTANT

## 2024-03-23 RX ORDER — FLUTICASONE PROPIONATE 50 MCG
1 SPRAY, SUSPENSION (ML) NASAL DAILY
Qty: 16 G | Refills: 0 | Status: SHIPPED | OUTPATIENT
Start: 2024-03-23 | End: 2024-03-30

## 2024-03-23 NOTE — PROGRESS NOTES
"Subjective:      Patient ID: Mayra Smart is a 58 y.o. female.    Vitals:  height is 5' 4" (1.626 m) and weight is 63.5 kg (140 lb). Her oral temperature is 98.5 °F (36.9 °C). Her blood pressure is 116/76 and her pulse is 99. Her respiration is 17 and oxygen saturation is 95%.     Chief Complaint: No chief complaint on file.    This is a 58 y.o. female who presents today with a chief complaint of  chills, fever, body aches, coughing, congestion, runny nose, sneezing and HA. Pt states that she had a temp of 101.0 thirty min ago took Tylenol. Pt was tested in clinic ion yesterday for covid and strep in which was negative.     Headache   This is a recurrent problem. The current episode started in the past 7 days. The problem occurs constantly. The problem has been unchanged. The quality of the pain is described as aching. Associated symptoms include coughing, dizziness, ear pain, a fever and rhinorrhea. Pertinent negatives include no abdominal pain, hearing loss, nausea, neck pain, sinus pressure, sore throat, tinnitus or vomiting. Nothing aggravates the symptoms. She has tried acetaminophen for the symptoms. The treatment provided no relief.       Constitution: Positive for sweating, fatigue and fever. Negative for appetite change, chills and unexpected weight change.   HENT:  Positive for ear pain, congestion and postnasal drip. Negative for ear discharge, tinnitus, hearing loss, dental problem, drooling, mouth sores, tongue pain, tongue lesion, facial swelling, sinus pain, sinus pressure, sore throat and trouble swallowing.    Neck: Negative for neck pain and neck stiffness.   Cardiovascular:  Negative for chest pain and sob on exertion.   Eyes:  Negative for eye discharge and eye itching.   Respiratory:  Positive for cough. Negative for chest tightness, sputum production, bloody sputum and shortness of breath.    Gastrointestinal:  Negative for abdominal pain, nausea, vomiting, constipation and diarrhea. " "  Musculoskeletal:  Positive for muscle ache. Negative for muscle cramps.   Skin:  Negative for rash.   Neurological:  Positive for dizziness and headaches. Negative for altered mental status.   Psychiatric/Behavioral:  Negative for altered mental status.       Past Medical History:   Diagnosis Date    Back pain     Bell's palsy     Breast disorder     fibercystic tissue    Fibroids     uterine    GERD (gastroesophageal reflux disease)     Headache     History of anesthesia reaction     sensitive to anesthesia, " Major hangover"    Neck pain     Pelvic pain in female     left lower quadrant    Preeclampsia        Past Surgical History:   Procedure Laterality Date     SECTION      times 2    COLONOSCOPY      x2    COLONOSCOPY N/A 2023    Procedure: COLONOSCOPY;  Surgeon: Lloyd Fleming MD;  Location: [facility] ENDO;  Service: Gastroenterology;  Laterality: N/A;    DILATION AND CURETTAGE OF UTERUS  2007    ENDOMETRIAL ABLATION  10/22/2008    BTL    HYSTERECTOMY      OOPHORECTOMY      REDUCTION OF BOTH BREASTS         Family History   Problem Relation Age of Onset    Pulmonary embolism Mother     Skin cancer Mother     Skin cancer Father     Breast cancer Neg Hx     Ovarian cancer Neg Hx        Social History     Socioeconomic History    Marital status:    Tobacco Use    Smoking status: Never     Passive exposure: Never    Smokeless tobacco: Never   Substance and Sexual Activity    Alcohol use: Yes     Comment: ocassionally    Drug use: No    Sexual activity: Yes     Social Determinants of Health     Financial Resource Strain: Low Risk  (2024)    Overall Financial Resource Strain (CARDIA)     Difficulty of Paying Living Expenses: Not very hard   Food Insecurity: No Food Insecurity (2024)    Hunger Vital Sign     Worried About Running Out of Food in the Last Year: Never true     Ran Out of Food in the Last Year: Never true   Transportation Needs: No Transportation Needs (2024) "    PRAPARE - Transportation     Lack of Transportation (Medical): No     Lack of Transportation (Non-Medical): No   Physical Activity: Insufficiently Active (2/23/2024)    Exercise Vital Sign     Days of Exercise per Week: 3 days     Minutes of Exercise per Session: 30 min   Stress: No Stress Concern Present (2/23/2024)    [employer] [place] [employer] - Occupational Stress Questionnaire     Feeling of Stress : Not at all   Social Connections: Unknown (2/23/2024)    Social Connection and Isolation Panel [NHANES]     Frequency of Communication with Friends and Family: Three times a week     Frequency of Social Gatherings with Friends and Family: Once a week     Active Member of Clubs or Organizations: Yes     Attends Club or Organization Meetings: More than 4 times per year     Marital Status:    Housing Stability: Low Risk  (2/23/2024)    Housing Stability Vital Sign     Unable to Pay for Housing in the Last Year: No     Number of Places Lived in the Last Year: 1     Unstable Housing in the Last Year: No       Current Outpatient Medications   Medication Sig Dispense Refill    atorvastatin (LIPITOR) 10 MG tablet TAKE 1 TABLET(10 MG) BY MOUTH EVERY DAY 30 tablet 5    lasmiditan (REYVOW) tablet 100 mg Take 1 tablet (100 mg) by mouth once daily as needed (migraine). 8 tablet 11    meloxicam (MOBIC) 15 MG tablet Take 1 tablet (15 mg total) by mouth once daily. 90 tablet 1    MINIVELLE 0.05 mg/24 hr Place 1 patch onto the skin twice a week.      minoxidiL (LONITEN) 2.5 MG tablet Take 1 tablet (2.5 mg total) by mouth once daily. 90 tablet 1    progesterone (PROMETRIUM) 100 MG capsule TK 1 C PO QHS  6    promethazine (PHENERGAN) 25 MG tablet Take 1 tablet (25 mg total) by mouth every 8 (eight) hours as needed for Nausea. 30 tablet 11    spironolactone (ALDACTONE) 25 MG tablet Take 1 tablet (25 mg total) by mouth once daily. 90 tablet 4    thyroid, pork, (NP THYROID) 60 mg Tab TAKE 1 TABLET BY MOUTH EVERY  DAY 90 tablet 3    VENTOLIN HFA 90 mcg/actuation inhaler       zavegepant (ZAVZPRET) 10 mg/actuation Spry 1 spray by Nasal route daily as needed (migraine). 6 each 6    budesonide-formoterol 160-4.5 mcg (SYMBICORT) 160-4.5 mcg/actuation HFAA Inhale 2 puffs into the lungs every 12 (twelve) hours. Controller 10 g 5    EPINEPHrine (EPIPEN) 0.3 mg/0.3 mL AtIn Inject 0.3 mLs (0.3 mg total) into the muscle once as needed. 2 Device 3     Current Facility-Administered Medications   Medication Dose Route Frequency Provider Last Rate Last Admin    onabotulinumtoxina injection 200 Units  200 Units Intramuscular Q90 Days Noelle Kowalski MD   200 Units at 09/06/23 0842    onabotulinumtoxina injection 200 Units  200 Units Intramuscular Q90 Days Noelle Kowalski MD   200 Units at 11/22/23 0902    onabotulinumtoxina injection 200 Units  200 Units Intramuscular Q90 Days Noelle Kowalski MD   200 Units at 02/23/24 1023       Review of patient's allergies indicates:   Allergen Reactions    Cephalosporins Anaphylaxis    Penicillins Anaphylaxis    Codeine Itching and Swelling    Opioids - morphine analogues      Vertigo, nausea, itchy throat, tongue enlgmt, rash    Vicoprofen [hydrocodone-ibuprofen] Other (See Comments)     Severe nausea       Objective:     Physical Exam   Constitutional: She is oriented to person, place, and time. She appears well-developed. She is cooperative.  Non-toxic appearance. She does not appear ill. No distress.   HENT:   Head: Normocephalic and atraumatic.   Ears:   Right Ear: Hearing, external ear and ear canal normal. Tympanic membrane is not injected, not erythematous, not retracted and not bulging. A middle ear effusion is present. impacted cerumen  Left Ear: Hearing, external ear and ear canal normal. Tympanic membrane is not injected, not erythematous, not retracted and not bulging. A middle ear effusion is present. impacted cerumen  Nose: Rhinorrhea and congestion present. No mucosal  edema or nasal deformity. No epistaxis. Right sinus exhibits no maxillary sinus tenderness and no frontal sinus tenderness. Left sinus exhibits no maxillary sinus tenderness and no frontal sinus tenderness.   Mouth/Throat: Uvula is midline and mucous membranes are normal. No trismus in the jaw. Normal dentition. No uvula swelling. Posterior oropharyngeal erythema present. No oropharyngeal exudate or posterior oropharyngeal edema.   Eyes: Conjunctivae and lids are normal. Right eye exhibits no discharge. Left eye exhibits no discharge. No scleral icterus.   Neck: Trachea normal and phonation normal. Neck supple. No edema present. No erythema present. No neck rigidity present.   Cardiovascular: Normal rate, regular rhythm, normal heart sounds and normal pulses.   No murmur heard.Exam reveals no gallop and no friction rub.   Pulmonary/Chest: Effort normal and breath sounds normal. No stridor. No respiratory distress. She has no decreased breath sounds. She has no wheezes. She has no rhonchi. She has no rales.   Abdominal: Normal appearance.   Musculoskeletal:      Cervical back: She exhibits tenderness.   Lymphadenopathy:     She has no cervical adenopathy.   Neurological: She is alert and oriented to person, place, and time. She exhibits normal muscle tone.   Skin: Skin is warm, dry, intact, not diaphoretic and not pale.   Psychiatric: Her speech is normal and behavior is normal. Mood, judgment and thought content normal.   Nursing note and vitals reviewed.    Results for orders placed or performed in visit on 03/23/24   SARS Coronavirus 2 Antigen, POCT Manual Read   Result Value Ref Range    SARS Coronavirus 2 Antigen Negative Negative     Acceptable Yes    POCT Strep A, Molecular   Result Value Ref Range    Molecular Strep A, POC Negative Negative     Acceptable Yes    POCT Influenza A/B MOLECULAR   Result Value Ref Range    POC Molecular Influenza A Ag Negative Negative, Not Reported  "   POC Molecular Influenza B Ag Negative Negative, Not Reported     Acceptable Yes        Assessment:     1. Viral URI with cough    2. Fever, unspecified fever cause    3. Acute effusion of both middle ears        Plan:       Viral URI with cough  -     SARS Coronavirus 2 Antigen, POCT Manual Read  -     POCT Strep A, Molecular  -     POCT Influenza A/B MOLECULAR  -     fluticasone propionate (FLONASE) 50 mcg/actuation nasal spray; 1 spray (50 mcg total) by Each Nostril route once daily. for 7 days  Dispense: 16 g; Refill: 0    Fever, unspecified fever cause  -     POCT Influenza A/B MOLECULAR    Acute effusion of both middle ears      Results reviewed  I have reviewed the patient chart and pertinent past imaging/labs.            Patient Instructions                                                            URI   If your condition worsens or fails to improve we recommend that you receive another evaluation at the urgent care/ER immediately or contact your PCP to discuss your concerns. You must understand that you've received an urgent care treatment only and that you may be released before all your medical problems are known or treated. You the patient will arrange for [facility] care as instructed.     If we discussed that I think your illness is viral, it will not respond to antibiotics and will last 10-14 days.   Use Flonase daily for nasal congestion.  Retest for covid at home in 48 hours  Zyrtec D, Claritin D or Allegra D can also help with symptoms of congestion and drainage.   If you have hypertension avoid using the "D" which is the decongestant   If you just have drainage you can take plain zyrtec, claritin or allegra     Rest and fluids are also important.     Salt water gargles, warm tea with honey and chloraseptic spray as needed for sore throat.   Tylenol or ibuprofen can also be used as directed for pain unless you have an allergy to them or medical condition such as stomach ulcers, " kidney or liver disease or blood thinners etc for which you should not be taking these type of medications.

## 2024-03-23 NOTE — PATIENT INSTRUCTIONS
"                                                         URI   If your condition worsens or fails to improve we recommend that you receive another evaluation at the urgent care/ER immediately or contact your PCP to discuss your concerns. You must understand that you've received an urgent care treatment only and that you may be released before all your medical problems are known or treated. You the patient will arrange for [facility] care as instructed.     If we discussed that I think your illness is viral, it will not respond to antibiotics and will last 10-14 days.   Use Flonase daily for nasal congestion.  Retest for covid at home in 48 hours  Zyrtec D, Claritin D or Allegra D can also help with symptoms of congestion and drainage.   If you have hypertension avoid using the "D" which is the decongestant   If you just have drainage you can take plain zyrtec, claritin or allegra     Rest and fluids are also important.     Salt water gargles, warm tea with honey and chloraseptic spray as needed for sore throat.   Tylenol or ibuprofen can also be used as directed for pain unless you have an allergy to them or medical condition such as stomach ulcers, kidney or liver disease or blood thinners etc for which you should not be taking these type of medications.         "

## 2024-04-08 ENCOUNTER — OFFICE VISIT (OUTPATIENT)
Dept: PAIN MEDICINE | Facility: CLINIC | Age: 59
End: 2024-04-08
Payer: COMMERCIAL

## 2024-04-08 VITALS
DIASTOLIC BLOOD PRESSURE: 80 MMHG | WEIGHT: 139.75 LBS | HEART RATE: 101 BPM | BODY MASS INDEX: 23.86 KG/M2 | SYSTOLIC BLOOD PRESSURE: 117 MMHG | HEIGHT: 64 IN

## 2024-04-08 DIAGNOSIS — M25.511 CHRONIC RIGHT SHOULDER PAIN: ICD-10-CM

## 2024-04-08 DIAGNOSIS — G89.29 CHRONIC RIGHT SHOULDER PAIN: ICD-10-CM

## 2024-04-08 DIAGNOSIS — M47.812 CERVICAL SPONDYLOSIS: Primary | ICD-10-CM

## 2024-04-08 DIAGNOSIS — M54.12 CERVICAL RADICULOPATHY: ICD-10-CM

## 2024-04-08 PROCEDURE — 99214 OFFICE O/P EST MOD 30 MIN: CPT | Mod: S$GLB,,, | Performed by: PHYSICIAN ASSISTANT

## 2024-04-08 PROCEDURE — 3008F BODY MASS INDEX DOCD: CPT | Mod: CPTII,S$GLB,, | Performed by: PHYSICIAN ASSISTANT

## 2024-04-08 PROCEDURE — 1160F RVW MEDS BY RX/DR IN RCRD: CPT | Mod: CPTII,S$GLB,, | Performed by: PHYSICIAN ASSISTANT

## 2024-04-08 PROCEDURE — 1159F MED LIST DOCD IN RCRD: CPT | Mod: CPTII,S$GLB,, | Performed by: PHYSICIAN ASSISTANT

## 2024-04-08 PROCEDURE — 3079F DIAST BP 80-89 MM HG: CPT | Mod: CPTII,S$GLB,, | Performed by: PHYSICIAN ASSISTANT

## 2024-04-08 PROCEDURE — 3074F SYST BP LT 130 MM HG: CPT | Mod: CPTII,S$GLB,, | Performed by: PHYSICIAN ASSISTANT

## 2024-04-08 PROCEDURE — 99999 PR PBB SHADOW E&M-EST. PATIENT-LVL IV: CPT | Mod: PBBFAC,,, | Performed by: PHYSICIAN ASSISTANT

## 2024-04-09 NOTE — PROGRESS NOTES
"[facility]        PCP:   Doris Myers MD    CC:   Chief Complaint   Patient presents with    Neck Pain    Shoulder Pain          HPI:   Mayra Smart is a 58 y.o. year old female patient who has a past medical history of Back pain, Bell's palsy, Breast disorder, Fibroids, GERD (gastroesophageal reflux disease), Headache, History of anesthesia reaction, Neck pain, Pelvic pain in female, and Preeclampsia. She presents for follow up of neck, shoulder and arm pain.  She has had MRI.  Symptoms persist.  She has pain in the posterior neck to the right greater than left arms.  She also has focal right shoulder pain with right shoulder abduction and rotation.  Shoulder pain is more than neck and arm pain at this time.          Initial HPI:  She presents in  for neck pain.  She hs neck and lower back pain, but neck is bothering her the most right now.  She describes years of pain "all of my adult life" in the neck.  Pain is felt in the posterior neck to the bilateral arms and hands.  She has been treated with PT for shoulder and neck pain recently with no significant improvement.  She has had shoulder steroid injections, but no neck CARMEN or trigger point injections.  Neck and arm pain are associated with numbness and tingling.     Denies bowel/ bladder incontinence.    Past and current medications:  Antineuropathics:  NSAIDs:  mobic  Antidepressants:  Muscle relaxers:  Opioids:  Antiplatelets/Anticoagulants:    Physical Therapy/ Chiropractic care:  PT  - for shoulder and neck pain 12/27/23 through 1/22/24    Pain Intervention History:  none    Past Spine Surgical History:  none        History:    Current Outpatient Medications:     atorvastatin (LIPITOR) 10 MG tablet, TAKE 1 TABLET(10 MG) BY MOUTH EVERY DAY, Disp: 30 tablet, Rfl: 5    budesonide-formoterol 160-4.5 mcg (SYMBICORT) 160-4.5 mcg/actuation HFAA, Inhale 2 puffs into the lungs every 12 (twelve) hours. Controller, Disp: 10 g, Rfl: 5    " "lasmiditan (REYVOW) tablet 100 mg, Take 1 tablet (100 mg) by mouth once daily as needed (migraine)., Disp: 8 tablet, Rfl: 11    meloxicam (MOBIC) 15 MG tablet, Take 1 tablet (15 mg total) by mouth once daily., Disp: 90 tablet, Rfl: 1    MINIVELLE 0.05 mg/24 hr, Place 1 patch onto the skin twice a week., Disp: , Rfl:     minoxidiL (LONITEN) 2.5 MG tablet, Take 1 tablet (2.5 mg total) by mouth once daily., Disp: 90 tablet, Rfl: 1    progesterone (PROMETRIUM) 100 MG capsule, TK 1 C PO QHS, Disp: , Rfl: 6    promethazine (PHENERGAN) 25 MG tablet, Take 1 tablet (25 mg total) by mouth every 8 (eight) hours as needed for Nausea., Disp: 30 tablet, Rfl: 11    thyroid, pork, (NP THYROID) 60 mg Tab, TAKE 1 TABLET BY MOUTH EVERY DAY, Disp: 90 tablet, Rfl: 3    VENTOLIN HFA 90 mcg/actuation inhaler, , Disp: , Rfl:     zavegepant (ZAVZPRET) 10 mg/actuation Spry, 1 spray by Nasal route daily as needed (migraine)., Disp: 6 each, Rfl: 6    EPINEPHrine (EPIPEN) 0.3 mg/0.3 mL AtIn, Inject 0.3 mLs (0.3 mg total) into the muscle once as needed., Disp: 2 Device, Rfl: 3    Current Facility-Administered Medications:     onabotulinumtoxina injection 200 Units, 200 Units, Intramuscular, Q90 Days, Noelle Kowalski MD, 200 Units at 23 0842    onabotulinumtoxina injection 200 Units, 200 Units, Intramuscular, Q90 Days, Noelle Kowalski MD, 200 Units at 23 0902    onabotulinumtoxina injection 200 Units, 200 Units, Intramuscular, Q90 Days, Noelle Kowalski MD, 200 Units at 24 1023    Past Medical History:   Diagnosis Date    Back pain     Bell's palsy     Breast disorder     fibercystic tissue    Fibroids     uterine    GERD (gastroesophageal reflux disease)     Headache     History of anesthesia reaction     sensitive to anesthesia, " Major hangover"    Neck pain     Pelvic pain in female     left lower quadrant    Preeclampsia        Past Surgical History:   Procedure Laterality Date     SECTION      " times 2    COLONOSCOPY      x2    COLONOSCOPY N/A 11/1/2023    Procedure: COLONOSCOPY;  Surgeon: Lloyd Fleming MD;  Location: [facility];  Service: Gastroenterology;  Laterality: N/A;    DILATION AND CURETTAGE OF UTERUS  09/25/2007    ENDOMETRIAL ABLATION  10/22/2008    BTL    HYSTERECTOMY      OOPHORECTOMY      REDUCTION OF BOTH BREASTS         Family History   Problem Relation Age of Onset    Pulmonary embolism Mother     Skin cancer Mother     Skin cancer Father     Breast cancer Neg Hx     Ovarian cancer Neg Hx        Social History     Socioeconomic History    Marital status:    Tobacco Use    Smoking status: Never     Passive exposure: Never    Smokeless tobacco: Never   Substance and Sexual Activity    Alcohol use: Yes     Comment: ocassionally    Drug use: No    Sexual activity: Yes     Social Determinants of Health     Financial Resource Strain: Low Risk  (2/23/2024)    Overall Financial Resource Strain (CARDIA)     Difficulty of Paying Living Expenses: Not very hard   Food Insecurity: No Food Insecurity (2/23/2024)    Hunger Vital Sign     Worried About Running Out of Food in the Last Year: Never true     Ran Out of Food in the Last Year: Never true   Transportation Needs: No Transportation Needs (2/23/2024)    PRAPARE - Transportation     Lack of Transportation (Medical): No     Lack of Transportation (Non-Medical): No   Physical Activity: Insufficiently Active (2/23/2024)    Exercise Vital Sign     Days of Exercise per Week: 3 days     Minutes of Exercise per Session: 30 min   Stress: No Stress Concern Present (2/23/2024)    [employer] [place] [employer] - Occupational Stress Questionnaire     Feeling of Stress : Not at all   Social Connections: Unknown (2/23/2024)    Social Connection and Isolation Panel [NHANES]     Frequency of Communication with Friends and Family: Three times a week     Frequency of Social Gatherings with Friends and Family: Once a week     Active Member of  "Clubs or Organizations: Yes     Attends Club or Organization Meetings: More than 4 times per year     Marital Status:    Housing Stability: Low Risk  (2/23/2024)    Housing Stability Vital Sign     Unable to Pay for Housing in the Last Year: No     Number of Places Lived in the Last Year: 1     Unstable Housing in the Last Year: No       Review of patient's allergies indicates:   Allergen Reactions    Cephalosporins Anaphylaxis    Penicillins Anaphylaxis    Codeine Itching and Swelling    Opioids - morphine analogues      Vertigo, nausea, itchy throat, tongue enlgmt, rash    Vicoprofen [hydrocodone-ibuprofen] Other (See Comments)     Severe nausea       Labs:  Lab Results   Component Value Date    HGBA1C 5.4 01/11/2023       Lab Results   Component Value Date    WBC 3.48 (L) 12/18/2023    HGB 12.5 12/18/2023    HCT 36.8 (L) 12/18/2023    MCV 88 12/18/2023     12/18/2023           Review of Systems:  Neck pain.  Arm pain.  Numbenss.  Tingling.  Low back pain..  Balance of review of systems is negative.    Physical Exam:  Vitals:    04/08/24 1549   BP: 117/80   Pulse: 101   Weight: 63.4 kg (139 lb 12.4 oz)   Height: 5' 4" (1.626 m)   PainSc:   8   PainLoc: Neck     Body mass index is 23.99 kg/m².    Pain disability index:      4/8/2024     3:47 PM   Last 3 PDI Scores   Pain Disability Index (PDI) 22       Gen: NAD  Psych: mood appropriate for given condition  HEENT: eyes anicteric   CV: RRR, 2+ radial pulse  Respiratory: non-labored, no signs of respiratory distress  Abd: non-distended  Skin: warm, dry and intact.  Gait: Able to heel walk, toe walk. No antalgic gait.     Coordination:   Tandem walking coordination: normal    Cervical spine: ROM is full in flexion, extension and lateral rotation without increased pain.  Spurling's maneuver causes neck pain to both sides.  Myofascial exam: No Tenderness to palpation across cervical paraspinous region bilaterally.    Lumbar spine:  Lumbar spine: ROM is full " with flexion extension and oblique extension with no increased pain.    Zeferino's test causes no increased pain on either side.    Supine straight leg raise is negative bilaterally.    Internal and external rotation of the hip causes no increased pain on either side.  Myofascial exam: No tenderness to palpation across lumbar paraspinous muscles. No tenderness to palpation over the bilateral greater trochanters and bilateral SI joint    Sensory:  Intact and symmetrical to light touch in C4-T1 dermatomes bilaterally. Intact and symmetrical to light touch in L1-S1 dermatomes bilaterally.    Motor:    Right Left   C4 Shoulder Abduction  5  5   C5 Elbow Flexion    5  5   C6 Wrist Extension  5  5   C7 Elbow Extension   5  5   C8/T1 Hand Intrinsics   5  5        Right Left   L2/3 Iliacus Hip flexion  5  5   L3/4 Qudratus Femoris Knee Extension  5  5   L4/5 Tib Anterior Ankle Dorsiflexion   5  5   L5/S1 Extensor Hallicus Longus Great toe extension  5  5   S1/S2 Gastroc/Soleus Plantar Flexion  5  5      Right Left   Triceps DTR 2+ 2+   Biceps DTR 2+ 2+   Brachioradialis DTR 2+ 2+   Patellar DTR 2+ 2+   Achilles DTR 2+ 2+   Harris Absent  Absent   Clonus Absent Absent   Babinski Absent Absent       Imaging:    Xray cervical spine from 2-28-24 reveiwed:  The vertebral bodies maintain normal height. There is no fracture. There is marked disc space narrowing at the C5-6 level with mild disc space narrowing at the C4-5 level. At the C5-6 level there is endplate sclerosis and osteophyte formation. Also, trace retrolisthesis of C5 on C6 is exaggerated by osteophyte formation. Alignment is otherwise normal. The odontoid process is intact. There is bilateral bony foraminal stenosis at the C5-6 level. No instability is demonstrated with flexion or extension.     MRI cervical spine from 3-15-24 reviewed:  C4/5 left disk/ osteophyte with left foraminal narrowing.  C5/6 disk/ osteophyte asymetric to the left with left greater than right  foraminal narrowing; slight progression from 10-12-16.    Assessment:   Mayra Smart is a 58 y.o. year old female patient who has a past medical history of Back pain, Bell's palsy, Breast disorder, Fibroids, GERD (gastroesophageal reflux disease), Headache, History of anesthesia reaction, Neck pain, Pelvic pain in female, and Preeclampsia. She presents for Posterior neck and arm pain to the hands - could be influencec by C5/6, spondylosis.  However cervical spondylosis does affect the left side more and she has more right sided symptoms.  Given more right sided pain, especially right shoulder pain, possibly there is a right shoulder underlying issue causing pain.      Plan:  - discussed further PT for right shoulder and neck/ arm pain.  She would like to try more PT and home exercise.  - also discussed further imaging of the right shoulder - MRI to assess for rotator cuff pathology/ tear.  She will consider and will call back if she wants to proceed.  - given C5/6 cervical spondylsis with foraminal narrowing, can consider cervical CARMEN to see if there is any improvement.  - she will consider and will call if she wants to proceed.    Problem List Items Addressed This Visit    None  Visit Diagnoses       Cervical spondylosis    -  Primary    Cervical radiculopathy        Chronic right shoulder pain                  Follow Up: RTC as needed    : Reviewed and consistent with medication use as prescribed.          Jazmyne He PA-C  [facility]

## 2024-04-16 PROBLEM — Q21.12 PFO WITH ATRIAL SEPTAL ANEURYSM: Status: ACTIVE | Noted: 2024-04-16

## 2024-04-16 PROBLEM — I25.3 PFO WITH ATRIAL SEPTAL ANEURYSM: Status: ACTIVE | Noted: 2024-04-16

## 2024-04-26 ENCOUNTER — INFUSION (OUTPATIENT)
Dept: INFUSION THERAPY | Facility: HOSPITAL | Age: 59
End: 2024-04-26
Attending: PSYCHIATRY & NEUROLOGY
Payer: COMMERCIAL

## 2024-04-26 VITALS
BODY MASS INDEX: 24.65 KG/M2 | OXYGEN SATURATION: 98 % | HEIGHT: 64 IN | TEMPERATURE: 98 F | HEART RATE: 96 BPM | WEIGHT: 144.38 LBS | DIASTOLIC BLOOD PRESSURE: 65 MMHG | RESPIRATION RATE: 18 BRPM | SYSTOLIC BLOOD PRESSURE: 116 MMHG

## 2024-04-26 DIAGNOSIS — G43.719 INTRACTABLE CHRONIC MIGRAINE WITHOUT AURA AND WITHOUT STATUS MIGRAINOSUS: Primary | ICD-10-CM

## 2024-04-26 PROCEDURE — 96365 THER/PROPH/DIAG IV INF INIT: CPT | Mod: PN

## 2024-04-26 PROCEDURE — 25000003 PHARM REV CODE 250: Mod: PN | Performed by: PSYCHIATRY & NEUROLOGY

## 2024-04-26 PROCEDURE — 63600175 PHARM REV CODE 636 W HCPCS: Mod: JZ,JG,PN | Performed by: PSYCHIATRY & NEUROLOGY

## 2024-04-26 RX ORDER — SODIUM CHLORIDE 0.9 % (FLUSH) 0.9 %
10 SYRINGE (ML) INJECTION
Status: DISCONTINUED | OUTPATIENT
Start: 2024-04-26 | End: 2024-04-26 | Stop reason: HOSPADM

## 2024-04-26 RX ORDER — METHYLPREDNISOLONE SOD SUCC 125 MG
125 VIAL (EA) INJECTION ONCE AS NEEDED
OUTPATIENT
Start: 2024-04-26

## 2024-04-26 RX ORDER — DIPHENHYDRAMINE HYDROCHLORIDE 50 MG/ML
25 INJECTION INTRAMUSCULAR; INTRAVENOUS ONCE AS NEEDED
OUTPATIENT
Start: 2024-04-26

## 2024-04-26 RX ORDER — ACETAMINOPHEN 500 MG
1000 TABLET ORAL ONCE AS NEEDED
OUTPATIENT
Start: 2024-04-26

## 2024-04-26 RX ORDER — ONDANSETRON HYDROCHLORIDE 2 MG/ML
8 INJECTION, SOLUTION INTRAVENOUS ONCE AS NEEDED
OUTPATIENT
Start: 2024-04-26

## 2024-04-26 RX ORDER — SODIUM CHLORIDE 9 MG/ML
INJECTION, SOLUTION INTRAVENOUS ONCE AS NEEDED
OUTPATIENT
Start: 2024-04-26

## 2024-04-26 RX ORDER — SODIUM CHLORIDE 0.9 % (FLUSH) 0.9 %
10 SYRINGE (ML) INJECTION
OUTPATIENT
Start: 2024-04-26

## 2024-04-26 RX ADMIN — EPTINEZUMAB-JJMR 300 MG: 100 INJECTION INTRAVENOUS at 03:04

## 2024-04-26 RX ADMIN — SODIUM CHLORIDE: 9 INJECTION, SOLUTION INTRAVENOUS at 03:04

## 2024-04-26 NOTE — PLAN OF CARE
1630 Patient tolerated vyepti infusion with no s/s of reaction and no complaints. PIV removed and catheter tip intact. Patient declined the AVS and understand next appt., she uses the [facility]. She ambulated out with no issues.

## 2024-04-26 NOTE — PLAN OF CARE
1540 Patient here for vyepti infusion. Labs, meds and hx reviewed. Patient complains of headache rated 7. PIV inserted and Ns started at 25ml/hr. [place] and water provided.

## 2024-05-16 ENCOUNTER — HOSPITAL ENCOUNTER (OUTPATIENT)
Dept: RADIOLOGY | Facility: HOSPITAL | Age: 59
Discharge: HOME | End: 2024-05-16
Attending: INTERNAL MEDICINE
Payer: COMMERCIAL

## 2024-05-16 DIAGNOSIS — Q21.12 PFO WITH ATRIAL SEPTAL ANEURYSM: ICD-10-CM

## 2024-05-16 DIAGNOSIS — I25.3 PFO WITH ATRIAL SEPTAL ANEURYSM: ICD-10-CM

## 2024-05-16 PROCEDURE — 93893 TCD STD ICR ART VEN-ART SHNT: CPT | Mod: TC

## 2024-05-16 PROCEDURE — 93893 TCD STD ICR ART VEN-ART SHNT: CPT | Mod: 26,,, | Performed by: RADIOLOGY

## 2024-05-16 NOTE — NURSING
patient arrived for scheduled Bubble Study in NAD, patient identification verified X 2, allergies reviewed, 20G IV started to right AC using aseptic technique, IV with good blood return and flushes easily, NS and blood agitated and injected X 2 per TCD tech's request, IV discontinued without difficulty, catheter tip intact, patient tolerated study well.

## 2024-05-22 ENCOUNTER — LAB VISIT (OUTPATIENT)
Dept: LAB | Facility: HOSPITAL | Age: 59
End: 2024-05-22
Payer: COMMERCIAL

## 2024-05-22 DIAGNOSIS — Q21.12 PATENT FORAMEN OVALE WITH RIGHT TO LEFT SHUNT: ICD-10-CM

## 2024-05-22 DIAGNOSIS — R06.02 SOB (SHORTNESS OF BREATH): ICD-10-CM

## 2024-05-22 DIAGNOSIS — I49.3 PVC'S (PREMATURE VENTRICULAR CONTRACTIONS): ICD-10-CM

## 2024-05-22 DIAGNOSIS — I63.89 OTHER CEREBRAL INFARCTION: ICD-10-CM

## 2024-05-22 DIAGNOSIS — L64.9 ANDROGENETIC ALOPECIA: ICD-10-CM

## 2024-05-22 DIAGNOSIS — R00.0 TACHYCARDIA: ICD-10-CM

## 2024-05-22 DIAGNOSIS — J45.41 MODERATE PERSISTENT ASTHMA WITH EXACERBATION: ICD-10-CM

## 2024-05-22 DIAGNOSIS — E03.8 ADULT ONSET HYPOTHYROIDISM: ICD-10-CM

## 2024-05-22 DIAGNOSIS — R53.83 FATIGUE, UNSPECIFIED TYPE: ICD-10-CM

## 2024-05-22 DIAGNOSIS — R00.2 PALPITATIONS: ICD-10-CM

## 2024-05-22 DIAGNOSIS — I10 ESSENTIAL HYPERTENSION, MALIGNANT: ICD-10-CM

## 2024-05-22 DIAGNOSIS — Q21.12 PFO WITH ATRIAL SEPTAL ANEURYSM: ICD-10-CM

## 2024-05-22 DIAGNOSIS — I25.3 PFO WITH ATRIAL SEPTAL ANEURYSM: ICD-10-CM

## 2024-05-22 DIAGNOSIS — Z00.01 ENCOUNTER FOR GENERAL ADULT MEDICAL EXAMINATION WITH ABNORMAL FINDINGS: ICD-10-CM

## 2024-05-22 DIAGNOSIS — Z12.11 SPECIAL SCREENING FOR MALIGNANT NEOPLASMS, COLON: ICD-10-CM

## 2024-05-22 DIAGNOSIS — D18.03 HEMANGIOMA OF LIVER: ICD-10-CM

## 2024-05-22 LAB
ALBUMIN SERPL BCP-MCNC: 4 G/DL (ref 3.5–5.2)
ALP SERPL-CCNC: 49 U/L (ref 55–135)
ALT SERPL W/O P-5'-P-CCNC: 18 U/L (ref 10–44)
ANION GAP SERPL CALC-SCNC: 5 MMOL/L (ref 8–16)
AST SERPL-CCNC: 18 U/L (ref 10–40)
BASOPHILS # BLD AUTO: 0.03 K/UL (ref 0–0.2)
BASOPHILS NFR BLD: 1.1 % (ref 0–1.9)
BILIRUB SERPL-MCNC: 0.6 MG/DL (ref 0.1–1)
BUN SERPL-MCNC: 11 MG/DL (ref 6–20)
CALCIUM SERPL-MCNC: 9.5 MG/DL (ref 8.7–10.5)
CHLORIDE SERPL-SCNC: 110 MMOL/L (ref 95–110)
CHOLEST SERPL-MCNC: 168 MG/DL (ref 120–199)
CHOLEST/HDLC SERPL: 2.8 {RATIO} (ref 2–5)
CO2 SERPL-SCNC: 25 MMOL/L (ref 23–29)
CREAT SERPL-MCNC: 0.8 MG/DL (ref 0.5–1.4)
DIFFERENTIAL METHOD BLD: ABNORMAL
EOSINOPHIL # BLD AUTO: 0.1 K/UL (ref 0–0.5)
EOSINOPHIL NFR BLD: 4.7 % (ref 0–8)
ERYTHROCYTE [DISTWIDTH] IN BLOOD BY AUTOMATED COUNT: 13.9 % (ref 11.5–14.5)
EST. GFR  (NO RACE VARIABLE): >60 ML/MIN/1.73 M^2
ESTIMATED AVG GLUCOSE: 103 MG/DL (ref 68–131)
GLUCOSE SERPL-MCNC: 91 MG/DL (ref 70–110)
HBA1C MFR BLD: 5.2 % (ref 4–5.6)
HCT VFR BLD AUTO: 38.1 % (ref 37–48.5)
HDLC SERPL-MCNC: 60 MG/DL (ref 40–75)
HDLC SERPL: 35.7 % (ref 20–50)
HGB BLD-MCNC: 12.7 G/DL (ref 12–16)
IMM GRANULOCYTES # BLD AUTO: 0 K/UL (ref 0–0.04)
IMM GRANULOCYTES NFR BLD AUTO: 0 % (ref 0–0.5)
LDLC SERPL CALC-MCNC: 96.8 MG/DL (ref 63–159)
LYMPHOCYTES # BLD AUTO: 1 K/UL (ref 1–4.8)
LYMPHOCYTES NFR BLD: 36.6 % (ref 18–48)
MCH RBC QN AUTO: 30.8 PG (ref 27–31)
MCHC RBC AUTO-ENTMCNC: 33.3 G/DL (ref 32–36)
MCV RBC AUTO: 92 FL (ref 82–98)
MONOCYTES # BLD AUTO: 0.2 K/UL (ref 0.3–1)
MONOCYTES NFR BLD: 7.9 % (ref 4–15)
NEUTROPHILS # BLD AUTO: 1.4 K/UL (ref 1.8–7.7)
NEUTROPHILS NFR BLD: 49.7 % (ref 38–73)
NONHDLC SERPL-MCNC: 108 MG/DL
NRBC BLD-RTO: 0 /100 WBC
PLATELET # BLD AUTO: 285 K/UL (ref 150–450)
PMV BLD AUTO: 9.5 FL (ref 9.2–12.9)
POTASSIUM SERPL-SCNC: 4.3 MMOL/L (ref 3.5–5.1)
PROT SERPL-MCNC: 6.9 G/DL (ref 6–8.4)
RBC # BLD AUTO: 4.13 M/UL (ref 4–5.4)
SODIUM SERPL-SCNC: 140 MMOL/L (ref 136–145)
T3FREE SERPL-MCNC: 3.3 PG/ML (ref 2.3–4.2)
T4 FREE SERPL-MCNC: 0.77 NG/DL (ref 0.71–1.51)
TRIGL SERPL-MCNC: 56 MG/DL (ref 30–150)
TSH SERPL DL<=0.005 MIU/L-ACNC: 1.07 UIU/ML (ref 0.4–4)
WBC # BLD AUTO: 2.79 K/UL (ref 3.9–12.7)

## 2024-05-22 PROCEDURE — 36415 COLL VENOUS BLD VENIPUNCTURE: CPT | Mod: PO | Performed by: INTERNAL MEDICINE

## 2024-05-22 PROCEDURE — 85025 COMPLETE CBC W/AUTO DIFF WBC: CPT | Performed by: INTERNAL MEDICINE

## 2024-05-22 PROCEDURE — 84443 ASSAY THYROID STIM HORMONE: CPT

## 2024-05-22 PROCEDURE — 84481 FREE ASSAY (FT-3): CPT

## 2024-05-22 PROCEDURE — 83036 HEMOGLOBIN GLYCOSYLATED A1C: CPT

## 2024-05-22 PROCEDURE — 80061 LIPID PANEL: CPT | Performed by: INTERNAL MEDICINE

## 2024-05-22 PROCEDURE — 84439 ASSAY OF FREE THYROXINE: CPT

## 2024-05-22 PROCEDURE — 80053 COMPREHEN METABOLIC PANEL: CPT | Performed by: INTERNAL MEDICINE

## 2024-05-23 ENCOUNTER — PATIENT MESSAGE (OUTPATIENT)
Dept: NEUROLOGY | Facility: CLINIC | Age: 59
End: 2024-05-23

## 2024-05-23 ENCOUNTER — PROCEDURE VISIT (OUTPATIENT)
Dept: NEUROLOGY | Facility: CLINIC | Age: 59
End: 2024-05-23
Payer: COMMERCIAL

## 2024-05-23 VITALS
WEIGHT: 141.13 LBS | DIASTOLIC BLOOD PRESSURE: 88 MMHG | BODY MASS INDEX: 24.1 KG/M2 | TEMPERATURE: 98 F | RESPIRATION RATE: 17 BRPM | SYSTOLIC BLOOD PRESSURE: 139 MMHG | HEART RATE: 86 BPM | HEIGHT: 64 IN

## 2024-05-23 DIAGNOSIS — G43.719 INTRACTABLE CHRONIC MIGRAINE WITHOUT AURA AND WITHOUT STATUS MIGRAINOSUS: ICD-10-CM

## 2024-05-23 DIAGNOSIS — Q21.12 PFO (PATENT FORAMEN OVALE): Primary | ICD-10-CM

## 2024-05-23 PROCEDURE — 64615 CHEMODENERV MUSC MIGRAINE: CPT | Mod: S$GLB,,, | Performed by: PSYCHIATRY & NEUROLOGY

## 2024-05-23 NOTE — PROCEDURES
ProceduresBOTOX PROCEDURE    PROCEDURE PERFORMED: Botulinum toxin injection (43310)    CLINICAL INDICATION: G43.719    A time out was conducted just before the start of the procedure to verify the correct patient and procedure, procedure location, and all relevant critical information.   The patient meets criteria for chronic headaches according to the ICHD-II, the patient has more than 15 headaches a month out of at least 8 are migraines which last for more than 4 hours a day.    The Botox injections have achieved well over 50%  improvement in the patient's symptoms. Migraines have been reduced at least 7 days per month and the number of cumulative hours suffering with headaches has been reduced at least 100 total hours per month. Today she does have a headache indicating that the Botox has worn off. Frequency of treatment is every 3 months unless no response to the treatments, at which time we will discontinue the injections.      DESCRIPTION OF PROCEDURE: After obtaining informed consent and under aseptic technique, a total of 175 units of botulinum toxin type A were injected in the following muscles: Procerus 5 units,  5 units bilaterally, frontalis 20 units, temporalis 20 units bilaterally, occipitalis 15 units, upper cervical paraspinals 10 units bilaterally, masseters 10 units bilaterally and trapezius 15 units bilaterally. The patient was given a total of 175 units in 31 sites.The patient tolerated the procedure well. There were no complications. The patient was given a prescription for repeat treatment in 3 months.     Unavoidable waste 25 units      Hemanth Kowalski M.D  Medical Director, Headache and Facial Pain  [facility]

## 2024-05-24 ENCOUNTER — PATIENT MESSAGE (OUTPATIENT)
Dept: NEUROLOGY | Facility: CLINIC | Age: 59
End: 2024-05-24
Payer: COMMERCIAL

## 2024-05-24 DIAGNOSIS — Q21.12 PFO (PATENT FORAMEN OVALE): Primary | ICD-10-CM

## 2024-06-04 ENCOUNTER — OFFICE VISIT (OUTPATIENT)
Dept: CARDIOLOGY | Facility: CLINIC | Age: 59
End: 2024-06-04
Payer: COMMERCIAL

## 2024-06-04 ENCOUNTER — PATIENT MESSAGE (OUTPATIENT)
Dept: CARDIOLOGY | Facility: CLINIC | Age: 59
End: 2024-06-04

## 2024-06-04 DIAGNOSIS — Q21.12 PFO WITH ATRIAL SEPTAL ANEURYSM: ICD-10-CM

## 2024-06-04 DIAGNOSIS — Q21.12 PFO (PATENT FORAMEN OVALE): ICD-10-CM

## 2024-06-04 DIAGNOSIS — I25.3 PFO WITH ATRIAL SEPTAL ANEURYSM: ICD-10-CM

## 2024-06-04 DIAGNOSIS — I10 HYPERTENSION, UNSPECIFIED TYPE: Primary | ICD-10-CM

## 2024-06-04 PROCEDURE — 1159F MED LIST DOCD IN RCRD: CPT | Mod: CPTII,95,, | Performed by: INTERNAL MEDICINE

## 2024-06-04 PROCEDURE — 3044F HG A1C LEVEL LT 7.0%: CPT | Mod: CPTII,95,, | Performed by: INTERNAL MEDICINE

## 2024-06-04 PROCEDURE — 99205 OFFICE O/P NEW HI 60 MIN: CPT | Mod: 95,,, | Performed by: INTERNAL MEDICINE

## 2024-06-04 PROCEDURE — 1160F RVW MEDS BY RX/DR IN RCRD: CPT | Mod: CPTII,95,, | Performed by: INTERNAL MEDICINE

## 2024-06-04 NOTE — ASSESSMENT & PLAN NOTE
Thirty day event monitor  CTR MR of the brain.  Make further comment when these are complete.  Patient is on Plavix which she should continue I have recommended adding 81 mg of aspirin daily until above studies are complete.

## 2024-06-04 NOTE — PROGRESS NOTES
The patient location is: home  The chief complaint leading to consultation is: pfo    Visit type: audiovisual    Face to Face time with patient: 25  50 minutes of total time spent on the encounter, which includes face to face time and non-face to face time preparing to see the patient (eg, review of tests), Obtaining and/or reviewing separately obtained history, Documenting clinical information in the electronic or other health record, Independently interpreting results (not separately reported) and communicating results to the patient/family/caregiver, or Care coordination (not separately reported).         Each patient to whom he or she provides medical services by telemedicine is:  (1) informed of the relationship between the physician and patient and the respective role of any other health care provider with respect to management of the patient; and (2) notified that he or she may decline to receive medical services by telemedicine and may withdraw from such care at any time.    Notes:    59-year-old female with history of migraine headaches, no known TIA or CVA, and found to have PFO.  She has other complaints which are unrelated to PFO.  She has not had transesophageal echo or CT/MR of the brain.  She has also not had a 30 day event monitor does state she occasionally has palpitations.    PFO with atrial septal aneurysm  Thirty day event monitor  CTR MR of the brain.  Make further comment when these are complete.

## 2024-06-05 DIAGNOSIS — Q21.12 PFO (PATENT FORAMEN OVALE): Primary | ICD-10-CM

## 2024-06-06 ENCOUNTER — PATIENT MESSAGE (OUTPATIENT)
Dept: CARDIOLOGY | Facility: CLINIC | Age: 59
End: 2024-06-06
Payer: COMMERCIAL

## 2024-06-07 ENCOUNTER — PATIENT MESSAGE (OUTPATIENT)
Dept: NEUROLOGY | Facility: CLINIC | Age: 59
End: 2024-06-07
Payer: COMMERCIAL

## 2024-06-09 RX ORDER — UBROGEPANT 100 MG/1
TABLET ORAL
Qty: 16 TABLET | Refills: 11 | Status: SHIPPED | OUTPATIENT
Start: 2024-06-09

## 2024-06-12 ENCOUNTER — PATIENT MESSAGE (OUTPATIENT)
Dept: CARDIOLOGY | Facility: CLINIC | Age: 59
End: 2024-06-12
Payer: COMMERCIAL

## 2024-07-13 ENCOUNTER — ON-DEMAND VIRTUAL (OUTPATIENT)
Dept: URGENT CARE | Facility: CLINIC | Age: 59
End: 2024-07-13
Payer: COMMERCIAL

## 2024-07-13 DIAGNOSIS — U07.1 COVID: Primary | ICD-10-CM

## 2024-07-13 PROCEDURE — 99213 OFFICE O/P EST LOW 20 MIN: CPT | Mod: 95,,, | Performed by: FAMILY MEDICINE

## 2024-07-13 NOTE — PATIENT INSTRUCTIONS
Tylenol 500 mg, 2 capsules every 6 hours as needed for fever and achy, Patient denies History of liver problem problem, patient could not tolerate the NSAID, OTC Flonase, Continue home inhaler,patient still has a fever in 3 days, see provider in person to rule out a secondaryPneumonia

## 2024-07-13 NOTE — PROGRESS NOTES
"  Subjective:      Patient ID: Mayra Smart is a 59 y.o. female.    Vitals:  vitals were not taken for this visit.     Chief Complaint: Cough (1)      Visit Type: TELE AUDIOVISUAL    Present with the patient at the time of consultation: TELEMED PRESENT WITH PATIENT: None    Past Medical History:   Diagnosis Date    Back pain     Bell's palsy     Breast disorder     fibercystic tissue    Fibroids     uterine    GERD (gastroesophageal reflux disease)     Headache     History of anesthesia reaction     sensitive to anesthesia, " Major hangover"    Neck pain     Pelvic pain in female     left lower quadrant    Preeclampsia      Past Surgical History:   Procedure Laterality Date     SECTION      times 2    COLONOSCOPY      x2    COLONOSCOPY N/A 2023    Procedure: COLONOSCOPY;  Surgeon: Lloyd Fleming MD;  Location: [facility];  Service: Gastroenterology;  Laterality: N/A;    DILATION AND CURETTAGE OF UTERUS  2007    ENDOMETRIAL ABLATION  10/22/2008    BTL    HYSTERECTOMY      OOPHORECTOMY      REDUCTION OF BOTH BREASTS       Review of patient's allergies indicates:   Allergen Reactions    Cephalosporins Anaphylaxis    Penicillins Anaphylaxis    Codeine Itching and Swelling    Opioids - morphine analogues      Vertigo, nausea, itchy throat, tongue enlgmt, rash    Vicoprofen [hydrocodone-ibuprofen] Other (See Comments)     Severe nausea     Current Outpatient Medications on File Prior to Visit   Medication Sig Dispense Refill    aspirin (ECOTRIN) 81 MG EC tablet Take 1 tablet (81 mg total) by mouth once daily.      atenoloL (TENORMIN) 25 MG tablet Take 1 tablet (25 mg total) by mouth every evening. 90 tablet 4    atorvastatin (LIPITOR) 10 MG tablet TAKE 1 TABLET(10 MG) BY MOUTH EVERY DAY 30 tablet 5    budesonide-formoterol 160-4.5 mcg (SYMBICORT) 160-4.5 mcg/actuation HFAA Inhale 2 puffs into the lungs every 12 (twelve) hours. Controller 10 g 5    EPINEPHrine (EPIPEN) 0.3 mg/0.3 mL AtIn " Inject 0.3 mLs (0.3 mg total) into the muscle once as needed. 2 Device 3    lasmiditan (REYVOW) tablet 100 mg Take 1 tablet (100 mg) by mouth once daily as needed (migraine). 8 tablet 11    meloxicam (MOBIC) 15 MG tablet TAKE 1 TABLET(15 MG) BY MOUTH EVERY DAY 90 tablet 1    MINIVELLE 0.05 mg/24 hr Place 1 patch onto the skin twice a week.      progesterone (PROMETRIUM) 100 MG capsule TK 1 C PO QHS  6    promethazine (PHENERGAN) 25 MG tablet Take 1 tablet (25 mg total) by mouth every 8 (eight) hours as needed for Nausea. 30 tablet 11    rivaroxaban (XARELTO) 20 mg Tab Take 1 tablet (20 mg total) by mouth every evening. 30 tablet 12    thyroid, pork, (NP THYROID) 60 mg Tab TAKE 1 TABLET BY MOUTH EVERY DAY 90 tablet 3    ubrogepant (UBRELVY) 100 mg tablet Start Ubrelvy 100 mg for headache attacks. May repeat once in 1 hour to a max of 2 per day. 16 tablet 11    VENTOLIN HFA 90 mcg/actuation inhaler       zavegepant (ZAVZPRET) 10 mg/actuation Spry 1 spray by Nasal route daily as needed (migraine). 6 each 6     Current Facility-Administered Medications on File Prior to Visit   Medication Dose Route Frequency Provider Last Rate Last Admin    onabotulinumtoxina injection 200 Units  200 Units Intramuscular Q90 Days Noelle Kowalski MD   200 Units at 09/06/23 0842    onabotulinumtoxina injection 200 Units  200 Units Intramuscular Q90 Days Noelle Kowalski MD   200 Units at 11/22/23 0902    onabotulinumtoxina injection 200 Units  200 Units Intramuscular Q90 Days Noelle Kowalski MD   200 Units at 02/23/24 1023    onabotulinumtoxina injection 200 Units  200 Units Intramuscular Q90 Days    200 Units at 05/23/24 0922     Family History   Problem Relation Name Age of Onset    Pulmonary embolism Mother      Skin cancer Mother      Skin cancer Father      Breast cancer Neg Hx      Ovarian cancer Neg Hx             Ohs Peq Odvv Intake    7/13/2024 12:42 PM CDT - Filed by Patient   What is your current physical  address in the event of a medical emergency? [id number] [place]   Are you able to take your vital signs? No   Please attach any relevant images or files          Test pos for covid,   fever 100.2   [place] and last [place], started to have a fever chills headache yesterday , today fever 100.2, coughing nasal congestion achy all over, patient has a history of asthma allergy induced, has a history of a Kovic, but a not hospitalized,    Cough  Associated symptoms include a fever, headaches and a sore throat.       Constitution: Positive for fever.   HENT:  Positive for sinus pressure and sore throat.    Respiratory:  Positive for cough.    Gastrointestinal:  Negative for abdominal trauma.   Neurological:  Positive for headaches.        Objective:   The physical exam was conducted virtually.  Physical Exam   Constitutional: She is oriented to person, place, and time.   HENT:   Head: Normocephalic and atraumatic.   Eyes: Conjunctivae are normal.   Pulmonary/Chest: Effort normal. No respiratory distress.   Abdominal: Normal appearance.   Neurological: no focal deficit. She is alert and oriented to person, place, and time.   Skin: Skin is no rash.   Psychiatric: Mood, judgment and thought content normal.       Assessment:     1. COVID        Plan:       COVID      Discussed diagnosis/plan with patient. Instructions regarding the visit diagnosis and management advised.  f/u with PCP within 2-5 days. ER precautions given if symptoms get any worse. All questions answered. Patient verbally understood and agreed with treatment plan.     Tylenol 500 mg, 2 capsules every 6 hours as needed for fever and achy, Patient denies History of liver problem problem, patient could not tolerate the NSAID, OTC Flonase, Continue home inhaler,patient still has a fever in 3 days, see provider in person to rule out a secondaryPneumonia

## 2024-08-07 ENCOUNTER — HOSPITAL ENCOUNTER (OUTPATIENT)
Dept: RADIOLOGY | Facility: HOSPITAL | Age: 59
Discharge: HOME | End: 2024-08-07
Payer: COMMERCIAL

## 2024-08-07 DIAGNOSIS — R05.1 ACUTE COUGH: ICD-10-CM

## 2024-08-07 PROCEDURE — 71046 X-RAY EXAM CHEST 2 VIEWS: CPT | Mod: 26,,, | Performed by: RADIOLOGY

## 2024-08-07 PROCEDURE — 71046 X-RAY EXAM CHEST 2 VIEWS: CPT | Mod: TC,FY,PO

## 2024-08-16 ENCOUNTER — PROCEDURE VISIT (OUTPATIENT)
Dept: NEUROLOGY | Facility: CLINIC | Age: 59
End: 2024-08-16
Payer: COMMERCIAL

## 2024-08-16 VITALS
BODY MASS INDEX: 23.98 KG/M2 | HEIGHT: 65 IN | TEMPERATURE: 98 F | RESPIRATION RATE: 17 BRPM | HEART RATE: 84 BPM | DIASTOLIC BLOOD PRESSURE: 71 MMHG | WEIGHT: 143.94 LBS | SYSTOLIC BLOOD PRESSURE: 96 MMHG

## 2024-08-16 DIAGNOSIS — G43.719 INTRACTABLE CHRONIC MIGRAINE WITHOUT AURA AND WITHOUT STATUS MIGRAINOSUS: Primary | ICD-10-CM

## 2024-08-16 NOTE — PROCEDURES
ProceduresBOTOX PROCEDURE    PROCEDURE PERFORMED: Botulinum toxin injection (92381)    CLINICAL INDICATION: G43.719    A time out was conducted just before the start of the procedure to verify the correct patient and procedure, procedure location, and all relevant critical information.   The patient meets criteria for chronic headaches according to the ICHD-II, the patient has more than 15 headaches a month out of at least 8 are migraines which last for more than 4 hours a day.    The Botox injections have achieved well over 50%  improvement in the patient's symptoms. Migraines have been reduced at least 7 days per month and the number of cumulative hours suffering with headaches has been reduced at least 100 total hours per month. Today she does have a headache indicating that the Botox has worn off. Frequency of treatment is every 3 months unless no response to the treatments, at which time we will discontinue the injections.      DESCRIPTION OF PROCEDURE: After obtaining informed consent and under aseptic technique, a total of 175 units of botulinum toxin type A were injected in the following muscles: Procerus 5 units,  5 units bilaterally, frontalis 20 units, temporalis 20 units bilaterally, occipitalis 15 units, upper cervical paraspinals 10 units bilaterally, masseters 10 units bilaterally and trapezius 15 units bilaterally. The patient was given a total of 175 units in 31 sites.The patient tolerated the procedure well. There were no complications. The patient was given a prescription for repeat treatment in 3 months.     Unavoidable waste 25 units      Hemanth Kowalski M.D  Medical Director, Headache and Facial Pain  [facility]

## 2024-09-18 ENCOUNTER — OFFICE VISIT (OUTPATIENT)
Dept: OBSTETRICS AND GYNECOLOGY | Facility: CLINIC | Age: 59
End: 2024-09-18
Payer: COMMERCIAL

## 2024-09-18 VITALS
BODY MASS INDEX: 25.52 KG/M2 | WEIGHT: 149.5 LBS | DIASTOLIC BLOOD PRESSURE: 66 MMHG | HEIGHT: 64 IN | SYSTOLIC BLOOD PRESSURE: 102 MMHG

## 2024-09-18 DIAGNOSIS — Z79.890 HORMONE REPLACEMENT THERAPY: ICD-10-CM

## 2024-09-18 DIAGNOSIS — Z01.419 WELL WOMAN EXAM: Primary | ICD-10-CM

## 2024-09-18 PROCEDURE — 3044F HG A1C LEVEL LT 7.0%: CPT | Mod: CPTII,S$GLB,, | Performed by: STUDENT IN AN ORGANIZED HEALTH CARE EDUCATION/TRAINING PROGRAM

## 2024-09-18 PROCEDURE — 3008F BODY MASS INDEX DOCD: CPT | Mod: CPTII,S$GLB,, | Performed by: STUDENT IN AN ORGANIZED HEALTH CARE EDUCATION/TRAINING PROGRAM

## 2024-09-18 PROCEDURE — 99386 PREV VISIT NEW AGE 40-64: CPT | Mod: S$GLB,,, | Performed by: STUDENT IN AN ORGANIZED HEALTH CARE EDUCATION/TRAINING PROGRAM

## 2024-09-18 PROCEDURE — 99999 PR PBB SHADOW E&M-EST. PATIENT-LVL IV: CPT | Mod: PBBFAC,,, | Performed by: STUDENT IN AN ORGANIZED HEALTH CARE EDUCATION/TRAINING PROGRAM

## 2024-09-18 PROCEDURE — 3074F SYST BP LT 130 MM HG: CPT | Mod: CPTII,S$GLB,, | Performed by: STUDENT IN AN ORGANIZED HEALTH CARE EDUCATION/TRAINING PROGRAM

## 2024-09-18 PROCEDURE — 3078F DIAST BP <80 MM HG: CPT | Mod: CPTII,S$GLB,, | Performed by: STUDENT IN AN ORGANIZED HEALTH CARE EDUCATION/TRAINING PROGRAM

## 2024-09-18 PROCEDURE — 1159F MED LIST DOCD IN RCRD: CPT | Mod: CPTII,S$GLB,, | Performed by: STUDENT IN AN ORGANIZED HEALTH CARE EDUCATION/TRAINING PROGRAM

## 2024-09-18 RX ORDER — ESTRADIOL 0.05 MG/D
1 FILM, EXTENDED RELEASE TRANSDERMAL
Qty: 8 PATCH | Refills: 11 | Status: SHIPPED | OUTPATIENT
Start: 2024-09-19 | End: 2025-09-19

## 2024-09-18 RX ORDER — PROGESTERONE 100 MG/1
100 CAPSULE ORAL NIGHTLY
Qty: 30 CAPSULE | Refills: 12 | Status: SHIPPED | OUTPATIENT
Start: 2024-09-18

## 2024-09-19 PROBLEM — Z87.74 S/P PATENT FORAMEN OVALE CLOSURE: Status: ACTIVE | Noted: 2024-09-19

## 2024-09-19 PROBLEM — R05.9 COUGH: Status: ACTIVE | Noted: 2024-09-19

## 2024-09-20 NOTE — PROGRESS NOTES
"History & Physical  Gynecology      SUBJECTIVE:     Chief Complaint: Establish Care       History of Present Illness:    Here today for well woman. Hx of hyst for AUB. No hx of abnormal paps prior to hyst. Not currently sexually active. On HRT. No immediate FH of gyn or colon cancer    No tobacco       Review of patient's allergies indicates:   Allergen Reactions    Cephalosporins Anaphylaxis    Penicillins Anaphylaxis    Codeine Itching and Swelling    Opioids - morphine analogues      Vertigo, nausea, itchy throat, tongue enlgmt, rash    Vicoprofen [hydrocodone-ibuprofen] Other (See Comments)     Severe nausea       Past Medical History:   Diagnosis Date    Back pain     Bell's palsy     during pregnancy    Breast disorder     fibercystic tissue    Fibroids     uterine    GERD (gastroesophageal reflux disease)     Headache     History of anesthesia reaction     sensitive to anesthesia, " Major hangover"    Neck pain     Pelvic pain in female     left lower quadrant    Preeclampsia     Thyroid disease      Past Surgical History:   Procedure Laterality Date     SECTION      times 2    CLOSURE, PFO  2024    Procedure: Closure, PFO;  Surgeon: Yoel Mccracken MD;  Location: [facility] CATH;  Service: Cardiology;;    COLONOSCOPY      x2    COLONOSCOPY N/A 2023    Procedure: COLONOSCOPY;  Surgeon: Lloyd Fleming MD;  Location: [facility] ENDO;  Service: Gastroenterology;  Laterality: N/A;    DILATION AND CURETTAGE OF UTERUS  2007    ENDOMETRIAL ABLATION  10/22/2008    BTL    HYSTERECTOMY      INTRACARDIAC ECHOCARDIOGRAPHY  2024    Procedure: Intracardiac echocardiogram;  Surgeon: Yoel Mccracken MD;  Location: [facility] CATH;  Service: Cardiology;;    OOPHORECTOMY      REDUCTION OF BOTH BREASTS       OB History          2    Para   2    Term   2            AB        Living             SAB        IAB        Ectopic        Multiple        Live Births                   Family History "   Problem Relation Name Age of Onset    Pulmonary embolism Mother      Skin cancer Mother      Skin cancer Father      Thyroid disease Sister      Heart disease Brother      Breast cancer Neg Hx      Ovarian cancer Neg Hx       Social History     Tobacco Use    Smoking status: Never     Passive exposure: Never    Smokeless tobacco: Never   Substance Use Topics    Alcohol use: Yes     Comment: ocassionally    Drug use: Never       Current Outpatient Medications   Medication Sig    atenoloL (TENORMIN) 25 MG tablet Take 1 tablet (25 mg total) by mouth every evening.    atorvastatin (LIPITOR) 10 MG tablet TAKE 1 TABLET(10 MG) BY MOUTH EVERY DAY    benzonatate (TESSALON) 100 MG capsule Take 1 capsule (100 mg total) by mouth 3 (three) times daily as needed for Cough.    budesonide-formoterol 160-4.5 mcg (SYMBICORT) 160-4.5 mcg/actuation HFAA Inhale 2 puffs into the lungs every 12 (twelve) hours. Controller    EPINEPHrine (EPIPEN) 0.3 mg/0.3 mL AtIn Inject 0.3 mLs (0.3 mg total) into the muscle once as needed.    estradiol 0.05 mg/24 hr td ptsw (VIVELLE-DOT) 0.05 mg/24 hr Place 1 patch onto the skin twice a week.    finasteride (PROPECIA) 1 mg tablet Take 1 tablet (1 mg total) by mouth once daily.    flecainide (TAMBOCOR) 150 MG Tab Take 1 tablet (150 mg total) by mouth daily as needed (afib).    ketoconazole (NIZORAL) 2 % shampoo Lather into affected areas. Rinse off in 5-10 min. Repeat 2-3 times a week.    lasmiditan (REYVOW) tablet 100 mg Take 1 tablet (100 mg) by mouth once daily as needed (migraine).    minoxidiL 5 % Foam Apply 1 Application topically every evening.    predniSONE (DELTASONE) 10 MG tablet 4 q day x 5 days (dispense additional 5)    progesterone (PROMETRIUM) 100 MG capsule Take 1 capsule (100 mg total) by mouth every evening.    promethazine (PHENERGAN) 25 MG tablet Take 1 tablet (25 mg total) by mouth every 8 (eight) hours as needed for Nausea.    rivaroxaban (XARELTO) 20 mg Tab Take 1 tablet (20 mg  total) by mouth every evening.    thyroid, pork, (NP THYROID) 60 mg Tab TAKE 1 TABLET BY MOUTH EVERY DAY    ubrogepant (UBRELVY) 100 mg tablet Start Ubrelvy 100 mg for headache attacks. May repeat once in 1 hour to a max of 2 per day.    VENTOLIN HFA 90 mcg/actuation inhaler     zavegepant (ZAVZPRET) 10 mg/actuation Spry 1 spray by Nasal route daily as needed (migraine).     Current Facility-Administered Medications   Medication    onabotulinumtoxina injection 200 Units    onabotulinumtoxina injection 200 Units    onabotulinumtoxina injection 200 Units    onabotulinumtoxina injection 200 Units         Review of Systems:  Review of Systems   Constitutional:  Negative for chills, fatigue and fever.   HENT:  Negative for congestion.    Eyes:  Negative for visual disturbance.   Respiratory:  Negative for cough and shortness of breath.    Cardiovascular:  Negative for chest pain and palpitations.   Gastrointestinal:  Negative for abdominal distention, abdominal pain, constipation, diarrhea, nausea and vomiting.   Genitourinary:  Negative for difficulty urinating, dysuria, hematuria, vaginal bleeding and vaginal discharge.   Skin:  Negative for rash.   Neurological:  Negative for dizziness, seizures, light-headedness and headaches.   Hematological:  Does not bruise/bleed easily.   Psychiatric/Behavioral:  Negative for dysphoric mood. The patient is not nervous/anxious.         OBJECTIVE:     Physical Exam:  Physical Exam  Vitals reviewed.   Constitutional:       General: She is not in acute distress.     Appearance: Normal appearance. She is well-developed.   HENT:      Head: Normocephalic and atraumatic.   Cardiovascular:      Rate and Rhythm: Normal rate and regular rhythm.   Pulmonary:      Effort: Pulmonary effort is normal.   Chest:   Breasts:     Right: No inverted nipple, mass, nipple discharge, skin change or tenderness.      Left: No inverted nipple, mass, nipple discharge, skin change or tenderness.   Abdominal:       General: There is no distension.      Palpations: Abdomen is soft.      Tenderness: There is no abdominal tenderness.   Genitourinary:     Vagina: Normal.      Comments: Normal external female genitalia, normal hair distribution. Vaginal mucosa pink, moist, well rugated, scant white physiologic discharge. Cuff intact. Adnexa without fullness or tenderness.    Skin:     General: Skin is warm.   Neurological:      Mental Status: She is alert and oriented to person, place, and time.   Psychiatric:         Behavior: Behavior normal.         Thought Content: Thought content normal.         Judgment: Judgment normal.           ASSESSMENT:       ICD-10-CM ICD-9-CM    1. Well woman exam  Z01.419 V72.31 Mammo Digital Screening Bilat      2. Hormone replacement therapy  Z79.890 V07.4 progesterone (PROMETRIUM) 100 MG capsule      estradiol 0.05 mg/24 hr td ptsw (VIVELLE-DOT) 0.05 mg/24 hr          Orders Placed This Encounter   Procedures    Mammo Digital Screening Bilat     Standing Status:   Future     Standing Expiration Date:   11/18/2025           Plan:      - Pap na  - MMG ordered  - colonoscopy  up to date  - tobacco cessation n/a  - contraception n/a  - HPV vaccine n/a  - Counseled to take daily multivitamin. If patient is of reproductive age and not on contraception, to take prenatal vitamin. Patient has been counseled on the vitamin D and calcium requirements per ACOG recommendations.  Age   Calcium(mg/day)   Vitamin D (IU/day)  9-18    1300                       600  19-50  1000                       600  51-70  1200                       600  >70      1,200                      800  Patient to start vit D    Layne Jones M.D.  Obstetrics and Gynecology

## 2024-09-21 ENCOUNTER — HOSPITAL ENCOUNTER (OUTPATIENT)
Dept: RADIOLOGY | Facility: HOSPITAL | Age: 59
Discharge: HOME | End: 2024-09-21
Attending: ORTHOPAEDIC SURGERY
Payer: COMMERCIAL

## 2024-09-21 DIAGNOSIS — M75.81 ROTATOR CUFF TENDINITIS, RIGHT: ICD-10-CM

## 2024-09-21 DIAGNOSIS — M75.01 ADHESIVE CAPSULITIS OF RIGHT SHOULDER: ICD-10-CM

## 2024-09-21 PROCEDURE — 73221 MRI JOINT UPR EXTREM W/O DYE: CPT | Mod: TC,PO,RT

## 2024-09-21 PROCEDURE — 73221 MRI JOINT UPR EXTREM W/O DYE: CPT | Mod: 26,RT,, | Performed by: RADIOLOGY

## 2024-09-24 ENCOUNTER — HOSPITAL ENCOUNTER (OUTPATIENT)
Dept: RADIOLOGY | Facility: HOSPITAL | Age: 59
Discharge: HOME | End: 2024-09-24
Attending: STUDENT IN AN ORGANIZED HEALTH CARE EDUCATION/TRAINING PROGRAM
Payer: COMMERCIAL

## 2024-09-24 DIAGNOSIS — Z12.31 ENCOUNTER FOR SCREENING MAMMOGRAM FOR MALIGNANT NEOPLASM OF BREAST: ICD-10-CM

## 2024-09-24 DIAGNOSIS — Z01.419 WELL WOMAN EXAM: ICD-10-CM

## 2024-09-24 PROCEDURE — 77063 BREAST TOMOSYNTHESIS BI: CPT | Mod: 26,,, | Performed by: RADIOLOGY

## 2024-09-24 PROCEDURE — 77067 SCR MAMMO BI INCL CAD: CPT | Mod: 26,,, | Performed by: RADIOLOGY

## 2024-09-24 PROCEDURE — 77067 SCR MAMMO BI INCL CAD: CPT | Mod: TC,PN

## 2024-09-24 PROCEDURE — 77063 BREAST TOMOSYNTHESIS BI: CPT | Mod: TC,PN

## 2024-09-28 ENCOUNTER — HOSPITAL ENCOUNTER (OUTPATIENT)
Dept: RADIOLOGY | Facility: HOSPITAL | Age: 59
Discharge: HOME | End: 2024-09-28
Payer: COMMERCIAL

## 2024-09-28 DIAGNOSIS — R05.3 CHRONIC COUGH: ICD-10-CM

## 2024-09-28 PROCEDURE — 71250 CT THORAX DX C-: CPT | Mod: TC,PO

## 2024-09-28 PROCEDURE — 71250 CT THORAX DX C-: CPT | Mod: 26,,, | Performed by: RADIOLOGY

## 2024-10-10 ENCOUNTER — OFFICE VISIT (OUTPATIENT)
Dept: PULMONOLOGY | Facility: CLINIC | Age: 59
End: 2024-10-10
Payer: COMMERCIAL

## 2024-10-10 VITALS
WEIGHT: 146.81 LBS | OXYGEN SATURATION: 100 % | DIASTOLIC BLOOD PRESSURE: 83 MMHG | HEART RATE: 79 BPM | BODY MASS INDEX: 25.06 KG/M2 | SYSTOLIC BLOOD PRESSURE: 125 MMHG | HEIGHT: 64 IN

## 2024-10-10 DIAGNOSIS — J47.9 RECURRENT BRONCHIECTASIS: ICD-10-CM

## 2024-10-10 DIAGNOSIS — J45.40 MODERATE PERSISTENT ASTHMA, UNCOMPLICATED: Primary | ICD-10-CM

## 2024-10-10 PROCEDURE — 99999 PR PBB SHADOW E&M-EST. PATIENT-LVL IV: CPT | Mod: PBBFAC,,, | Performed by: INTERNAL MEDICINE

## 2024-10-10 RX ORDER — LEVALBUTEROL TARTRATE 45 UG/1
1-2 AEROSOL, METERED ORAL EVERY 4 HOURS PRN
Qty: 15 G | Refills: 0 | Status: SHIPPED | OUTPATIENT
Start: 2024-10-10 | End: 2025-10-10

## 2024-10-10 RX ORDER — PREDNISONE 20 MG/1
TABLET ORAL
Qty: 36 TABLET | Refills: 0 | Status: SHIPPED | OUTPATIENT
Start: 2024-10-10

## 2024-10-10 RX ORDER — FLUTICASONE FUROATE, UMECLIDINIUM BROMIDE AND VILANTEROL TRIFENATATE 200; 62.5; 25 UG/1; UG/1; UG/1
1 POWDER RESPIRATORY (INHALATION) DAILY
Qty: 60 EACH | Refills: 11 | Status: SHIPPED | OUTPATIENT
Start: 2024-10-10

## 2024-10-10 RX ORDER — DOXYCYCLINE 100 MG/1
100 CAPSULE ORAL 2 TIMES DAILY
Qty: 20 CAPSULE | Refills: 2 | Status: SHIPPED | OUTPATIENT
Start: 2024-10-10

## 2024-10-10 NOTE — PROGRESS NOTES
"10/10/2024    Mayra Smart  New Patient Consult    Chief Complaint   Patient presents with    Cough       HPI:   October 10, 2024-  pt has had allergy induced problems.   Will have problems less than once a yr typically.    Pt had had covid once to twice yearly --  pt declines wheezes and not really nightly worsening.    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    Pt has had cough lingering-- pt had cough flares with covid that would clear after 8 weeks.  But now lingering since last covid ....    Pt will have chest tightness -- relieved with symbicort and albuterol.      No chr sinus. Pt has various animals at [employer] -- multi allergies      the patient had a patent foramen ovale (had migraines and may have had stroke ?) closed in July with decrease in migranes from daily to once a week..   -she has been having atrial fib and tachycardia problem subsequent.  The patient had screening CT scan September with no worrisome findings.  A liver hemangioma was seen.  Pt off xarelto but uses if in a fib...    Albuterol ppt some heart racing    Her mother had a pulmonary embolus.  Patient is on anticoagulant with atrial fib.         PFSH:  Past Medical History:   Diagnosis Date    Back pain     Bell's palsy     during pregnancy    Breast disorder     fibercystic tissue    Fibroids     uterine    GERD (gastroesophageal reflux disease)     Headache     History of anesthesia reaction     sensitive to anesthesia, " Major hangover"    Neck pain     Pelvic pain in female     left lower quadrant    Preeclampsia     Thyroid disease          Past Surgical History:   Procedure Laterality Date     SECTION      times 2    CLOSURE, PFO  2024    Procedure: Closure, PFO;  Surgeon: Yoel Mccracken MD;  Location: [facility] CATH;  Service: Cardiology;;    COLONOSCOPY      x2    COLONOSCOPY N/A 2023    Procedure: COLONOSCOPY;  Surgeon: Lloyd Fleming MD;  Location: [facility] ENDO;  Service: " "Gastroenterology;  Laterality: N/A;    DILATION AND CURETTAGE OF UTERUS  09/25/2007    ENDOMETRIAL ABLATION  10/22/2008    BTL    HYSTERECTOMY      INTRACARDIAC ECHOCARDIOGRAPHY  7/31/2024    Procedure: Intracardiac echocardiogram;  Surgeon: Yoel Mccracken MD;  Location: [facility];  Service: Cardiology;;    OOPHORECTOMY      REDUCTION OF BOTH BREASTS       Social History     Tobacco Use    Smoking status: Never     Passive exposure: Never    Smokeless tobacco: Never   Substance Use Topics    Alcohol use: Yes     Comment: ocassionally    Drug use: Never     Family History   Problem Relation Name Age of Onset    Pulmonary embolism Mother      Skin cancer Mother      Skin cancer Father      Thyroid disease Sister      Heart disease Brother      Breast cancer Neg Hx      Ovarian cancer Neg Hx       Review of patient's allergies indicates:   Allergen Reactions    Cephalosporins Anaphylaxis    Penicillins Anaphylaxis    Codeine Itching and Swelling    Opioids - morphine analogues      Vertigo, nausea, itchy throat, tongue enlgmt, rash    Vicoprofen [hydrocodone-ibuprofen] Other (See Comments)     Severe nausea          Review of Systems:  a review of eleven systems covering constitutional, Eye, HEENT, Psych, Respiratory, Cardiac, GI, , Musculoskeletal, Endocrine, Dermatologic was negative except for pertinent findings as listed ABOVE and below:  pertinent positives as above, rest good       Exam:Comprehensive exam done. /83   Pulse 79   Ht 5' 4" (1.626 m)   Wt 66.6 kg (146 lb 13.2 oz)   LMP 10/22/2008   SpO2 100% Comment: on room air at rest  BMI 25.20 kg/m²   Exam included Vitals as listed, and patient's appearance and affect and alertness and mood, oral exam for yeast and hygiene and pharynx lesions and Mallapatti (M) score, neck with inspection for jvd and masses and thyroid abnormalities and lymph nodes (supraclavicular and infraclavicular nodes and axillary also examined and noted if abn), chest exam " included symmetry and effort and fremitus and percussion and auscultation, cardiac exam included rhythm and gallops and murmur and rubs and jvd and edema, abdominal exam for mass and hepatosplenomegaly and tenderness and hernias and bowel sounds, Musculoskeletal exam with muscle tone and posture and mobility/gait and  strength, and skin for rashes and cyanosis and pallor and turgor, extremity for clubbing.  Findings were normal except for pertinent findings listed below:  M3, chest is symmetric, no distress, normal percussion, normal fremitus and good normal breath sounds           Radiographs (ct chest and cxr) reviewed: view by direct vision      Labs reviewed           PFT will be done and results to be reviewed       Plan:  Clinical impression is apparently straight forward and impression with management as below.    Mayra was seen today for cough.    Diagnoses and all orders for this visit:    Moderate persistent asthma, uncomplicated  -     levalbuterol (XOPENEX HFA) 45 mcg/actuation inhaler; Inhale 1-2 puffs into the lungs every 4 (four) hours as needed for Wheezing. Rescue  -     IgG; Future  -     IGM; Future  -     Humoral Immune Eval (Pneumo Serotypes) With H. Flu; Future  -     predniSONE (DELTASONE) 20 MG tablet; 2 for 3 days then one for 3 days and repeat for breathing problems  -     fluticasone-umeclidin-vilanter (TRELEGY ELLIPTA) 200-62.5-25 mcg inhaler; Inhale 1 puff into the lungs once daily.  -     doxycycline (VIBRAMYCIN) 100 MG Cap; Take 1 capsule (100 mg total) by mouth 2 (two) times daily.    Recurrent bronchiectasis  -     IgG; Future  -     IGM; Future  -     Humoral Immune Eval (Pneumo Serotypes) With H. Flu; Future        Follow up if symptoms worsen or fail to improve.    Discussed with patient above for education the following:      Patient Instructions   You relate recurrent lung infections-- will check immune system-- you had prior pneumonia vaccine..      You have cough  variant -- was intermittent and now persistent. Moderate now with no controller...      Would treat for asthma-- controller --use trelegy once daily or symbicort 2 twice daily - rinse mouth   Rescue-- Use xopenex or albuterol for cough    Action plan---Use prednisone if needed-- to remit cough or if xopenex not effective---- use one or two with taper -- stop and use controller to keep remitted.    May use doxycycline if yellow mucous/infection-- avoid sunshine    Ct chest viewed- mildly thickened airways seen, ninimal nodules -- no follow up needed...      Would do pulmonary function if not clear of symptoms    Allergy exposure might cause more generalized asthma??????  Evaluation took 56 minutes

## 2024-10-10 NOTE — PATIENT INSTRUCTIONS
You relate recurrent lung infections-- will check immune system-- you had prior pneumonia vaccine..      You have cough variant -- was intermittent and now persistent. Moderate now with no controller...      Would treat for asthma-- controller --use trelegy once daily or symbicort 2 twice daily - rinse mouth   Rescue-- Use xopenex or albuterol for cough    Action plan---Use prednisone if needed-- to remit cough or if xopenex not effective---- use one or two with taper -- stop and use controller to keep remitted.    May use doxycycline if yellow mucous/infection-- avoid sunshine    Ct chest viewed- mildly thickened airways seen, ninimal nodules -- no follow up needed...      Would do pulmonary function if not clear of symptoms    Allergy exposure might cause more generalized asthma??????

## 2024-10-23 ENCOUNTER — TELEPHONE (OUTPATIENT)
Dept: PULMONOLOGY | Facility: CLINIC | Age: 59
End: 2024-10-23
Payer: COMMERCIAL

## 2024-10-23 NOTE — TELEPHONE ENCOUNTER
Paperwork faxed to [facility] [place] to appeal denial on levalbuterol   [phone]

## 2024-10-25 ENCOUNTER — TELEPHONE (OUTPATIENT)
Dept: PULMONOLOGY | Facility: CLINIC | Age: 59
End: 2024-10-25
Payer: COMMERCIAL

## 2024-10-25 NOTE — TELEPHONE ENCOUNTER
----- Message from Micaela sent at 10/25/2024 10:47 AM CDT -----  Contact: ken velasquez  Type: Needs Medical Advice  Who Called:  ken vleasquez  Best Call Back Number: none given  Additional Information: medical apeal approved.fax has been sent with pa

## 2024-10-25 NOTE — TELEPHONE ENCOUNTER
Fax also recvd stating ins approved pt to receive levalbuterol    Auth # PA: [id number]  10/25/24-10/25/25

## 2024-10-28 PROBLEM — G89.29 CHRONIC RIGHT SHOULDER PAIN: Status: ACTIVE | Noted: 2024-10-28

## 2024-10-28 PROBLEM — M25.611 DECREASED RANGE OF MOTION OF RIGHT SHOULDER: Status: ACTIVE | Noted: 2024-10-28

## 2024-10-28 PROBLEM — M25.512 SHOULDER PAIN, BILATERAL: Status: RESOLVED | Noted: 2023-12-27 | Resolved: 2024-10-28

## 2024-10-28 PROBLEM — R29.898 WEAKNESS OF RIGHT UPPER EXTREMITY: Status: ACTIVE | Noted: 2024-10-28

## 2024-10-28 PROBLEM — M25.511 RIGHT SHOULDER PAIN: Status: ACTIVE | Noted: 2024-10-28

## 2024-10-28 PROBLEM — R29.898 DECREASED RANGE OF MOTION OF NECK: Status: RESOLVED | Noted: 2023-12-27 | Resolved: 2024-10-28

## 2024-10-28 PROBLEM — M25.511 SHOULDER PAIN, BILATERAL: Status: RESOLVED | Noted: 2023-12-27 | Resolved: 2024-10-28

## 2024-10-28 PROBLEM — M54.2 NECK PAIN: Status: RESOLVED | Noted: 2023-12-27 | Resolved: 2024-10-28

## 2024-11-08 ENCOUNTER — PROCEDURE VISIT (OUTPATIENT)
Dept: NEUROLOGY | Facility: CLINIC | Age: 59
End: 2024-11-08
Payer: COMMERCIAL

## 2024-11-08 VITALS
SYSTOLIC BLOOD PRESSURE: 108 MMHG | TEMPERATURE: 97 F | DIASTOLIC BLOOD PRESSURE: 70 MMHG | RESPIRATION RATE: 17 BRPM | HEART RATE: 78 BPM | BODY MASS INDEX: 25.06 KG/M2 | HEIGHT: 64 IN | WEIGHT: 146.81 LBS

## 2024-11-08 DIAGNOSIS — G43.719 INTRACTABLE CHRONIC MIGRAINE WITHOUT AURA AND WITHOUT STATUS MIGRAINOSUS: Primary | ICD-10-CM

## 2024-11-08 NOTE — PROCEDURES
ProceduresBOTOX PROCEDURE    PROCEDURE PERFORMED: Botulinum toxin injection (13981)    CLINICAL INDICATION: G43.719    A time out was conducted just before the start of the procedure to verify the correct patient and procedure, procedure location, and all relevant critical information.   The patient meets criteria for chronic headaches according to the ICHD-II, the patient has more than 15 headaches a month out of at least 8 are migraines which last for more than 4 hours a day.    The Botox injections have achieved well over 50%  improvement in the patient's symptoms. Migraines have been reduced at least 7 days per month and the number of cumulative hours suffering with headaches has been reduced at least 100 total hours per month. Today she does have a headache indicating that the Botox has worn off. Frequency of treatment is every 3 months unless no response to the treatments, at which time we will discontinue the injections.      DESCRIPTION OF PROCEDURE: After obtaining informed consent and under aseptic technique, a total of 175 units of botulinum toxin type A were injected in the following muscles: Procerus 5 units,  5 units bilaterally, frontalis 20 units, temporalis 20 units bilaterally, occipitalis 15 units, upper cervical paraspinals 10 units bilaterally, masseters 10 units bilaterally and trapezius 15 units bilaterally. The patient was given a total of 175 units in 31 sites.The patient tolerated the procedure well. There were no complications. The patient was given a prescription for repeat treatment in 3 months.     Unavoidable waste 25 units      Hemanth Kowalski M.D  Medical Director, Headache and Facial Pain  [facility]

## 2024-12-17 ENCOUNTER — PATIENT MESSAGE (OUTPATIENT)
Dept: NEUROLOGY | Facility: CLINIC | Age: 59
End: 2024-12-17
Payer: COMMERCIAL

## 2024-12-17 ENCOUNTER — TELEPHONE (OUTPATIENT)
Dept: FAMILY MEDICINE | Facility: CLINIC | Age: 59
End: 2024-12-17
Payer: COMMERCIAL

## 2024-12-17 NOTE — TELEPHONE ENCOUNTER
----- Message from Micaela sent at 12/17/2024  1:30 PM CST -----  Contact: mi  Type: Needs Medical Advice  Who Called:  pt  Best Call Back Number: [phone]   Additional Information: pt was seeing dr christophe miranda. Dr miranda is starting a  so pt is looking for a new dr.please call

## 2025-01-02 ENCOUNTER — LAB VISIT (OUTPATIENT)
Dept: LAB | Facility: HOSPITAL | Age: 60
End: 2025-01-02
Attending: INTERNAL MEDICINE
Payer: COMMERCIAL

## 2025-01-02 DIAGNOSIS — J47.9 RECURRENT BRONCHIECTASIS: ICD-10-CM

## 2025-01-02 DIAGNOSIS — J45.40 MODERATE PERSISTENT ASTHMA, UNCOMPLICATED: ICD-10-CM

## 2025-01-02 LAB
IGG SERPL-MCNC: 1137 MG/DL (ref 650–1600)
IGM SERPL-MCNC: 85 MG/DL (ref 50–300)

## 2025-01-02 PROCEDURE — 86684 HEMOPHILUS INFLUENZA ANTIBDY: CPT | Performed by: INTERNAL MEDICINE

## 2025-01-02 PROCEDURE — 82784 ASSAY IGA/IGD/IGG/IGM EACH: CPT | Performed by: INTERNAL MEDICINE

## 2025-01-02 PROCEDURE — 36415 COLL VENOUS BLD VENIPUNCTURE: CPT | Mod: PO | Performed by: INTERNAL MEDICINE

## 2025-01-02 PROCEDURE — 86648 DIPHTHERIA ANTIBODY: CPT | Performed by: INTERNAL MEDICINE

## 2025-01-02 PROCEDURE — 82784 ASSAY IGA/IGD/IGG/IGM EACH: CPT | Mod: 59 | Performed by: INTERNAL MEDICINE

## 2025-01-02 PROCEDURE — 86317 IMMUNOASSAY INFECTIOUS AGENT: CPT | Performed by: INTERNAL MEDICINE

## 2025-01-02 PROCEDURE — 86774 TETANUS ANTIBODY: CPT | Performed by: INTERNAL MEDICINE

## 2025-01-02 RX ORDER — PREDNISONE 20 MG/1
TABLET ORAL
Qty: 36 TABLET | Refills: 0 | Status: SHIPPED | OUTPATIENT
Start: 2025-01-02

## 2025-01-03 DIAGNOSIS — J45.40 MODERATE PERSISTENT ASTHMA, UNCOMPLICATED: ICD-10-CM

## 2025-01-06 ENCOUNTER — OFFICE VISIT (OUTPATIENT)
Dept: NEUROLOGY | Facility: CLINIC | Age: 60
End: 2025-01-06
Payer: COMMERCIAL

## 2025-01-06 ENCOUNTER — OFFICE VISIT (OUTPATIENT)
Dept: PULMONOLOGY | Facility: CLINIC | Age: 60
End: 2025-01-06
Payer: COMMERCIAL

## 2025-01-06 VITALS — BODY MASS INDEX: 24.99 KG/M2 | HEIGHT: 64 IN | WEIGHT: 146.38 LBS

## 2025-01-06 VITALS
SYSTOLIC BLOOD PRESSURE: 132 MMHG | BODY MASS INDEX: 25.15 KG/M2 | RESPIRATION RATE: 17 BRPM | HEART RATE: 83 BPM | WEIGHT: 146.5 LBS | DIASTOLIC BLOOD PRESSURE: 90 MMHG

## 2025-01-06 DIAGNOSIS — Z87.74 S/P PATENT FORAMEN OVALE CLOSURE: ICD-10-CM

## 2025-01-06 DIAGNOSIS — J45.40 MODERATE PERSISTENT ASTHMA, UNCOMPLICATED: ICD-10-CM

## 2025-01-06 DIAGNOSIS — J45.50 SEVERE PERSISTENT ASTHMA, UNCOMPLICATED: Primary | ICD-10-CM

## 2025-01-06 DIAGNOSIS — G43.719 INTRACTABLE CHRONIC MIGRAINE WITHOUT AURA AND WITHOUT STATUS MIGRAINOSUS: Primary | ICD-10-CM

## 2025-01-06 DIAGNOSIS — R00.2 PALPITATIONS: ICD-10-CM

## 2025-01-06 DIAGNOSIS — I10 PRIMARY HYPERTENSION: ICD-10-CM

## 2025-01-06 PROCEDURE — 99999 PR PBB SHADOW E&M-EST. PATIENT-LVL III: CPT | Mod: PBBFAC,,, | Performed by: PSYCHIATRY & NEUROLOGY

## 2025-01-06 PROCEDURE — 3080F DIAST BP >= 90 MM HG: CPT | Mod: CPTII,S$GLB,, | Performed by: PSYCHIATRY & NEUROLOGY

## 2025-01-06 PROCEDURE — 3008F BODY MASS INDEX DOCD: CPT | Mod: CPTII,S$GLB,, | Performed by: PSYCHIATRY & NEUROLOGY

## 2025-01-06 PROCEDURE — 99214 OFFICE O/P EST MOD 30 MIN: CPT | Mod: S$GLB,,, | Performed by: PSYCHIATRY & NEUROLOGY

## 2025-01-06 PROCEDURE — 3075F SYST BP GE 130 - 139MM HG: CPT | Mod: CPTII,S$GLB,, | Performed by: PSYCHIATRY & NEUROLOGY

## 2025-01-06 RX ORDER — PREDNISONE 20 MG/1
TABLET ORAL
Qty: 36 TABLET | Refills: 0 | Status: SHIPPED | OUTPATIENT
Start: 2025-01-06 | End: 2025-01-06 | Stop reason: SDUPTHER

## 2025-01-06 RX ORDER — DOXYCYCLINE 100 MG/1
100 CAPSULE ORAL 2 TIMES DAILY
Qty: 28 CAPSULE | Refills: 2 | Status: SHIPPED | OUTPATIENT
Start: 2025-01-06

## 2025-01-06 RX ORDER — CODEINE PHOSPHATE AND GUAIFENESIN 10; 100 MG/5ML; MG/5ML
10 SOLUTION ORAL 3 TIMES DAILY PRN
Qty: 237 ML | Refills: 0 | Status: SHIPPED | OUTPATIENT
Start: 2025-01-06

## 2025-01-06 RX ORDER — ATOGEPANT 30 MG/1
30 TABLET ORAL DAILY
Qty: 30 TABLET | Refills: 3 | Status: SHIPPED | OUTPATIENT
Start: 2025-01-06

## 2025-01-06 RX ORDER — PREDNISONE 20 MG/1
TABLET ORAL
Qty: 45 TABLET | Refills: 1 | Status: SHIPPED | OUTPATIENT
Start: 2025-01-06

## 2025-01-06 RX ORDER — LEVALBUTEROL TARTRATE 45 UG/1
1-2 AEROSOL, METERED ORAL EVERY 4 HOURS PRN
Qty: 15 G | Refills: 11 | Status: SHIPPED | OUTPATIENT
Start: 2025-01-06

## 2025-01-06 RX ORDER — DIAZEPAM 5 MG/1
5 TABLET ORAL EVERY 8 HOURS PRN
Qty: 60 TABLET | Refills: 1 | Status: SHIPPED | OUTPATIENT
Start: 2025-01-06 | End: 2025-02-05

## 2025-01-06 RX ORDER — ATENOLOL 25 MG/1
50 TABLET ORAL NIGHTLY
Qty: 180 TABLET | Refills: 3 | Status: SHIPPED | OUTPATIENT
Start: 2025-01-06 | End: 2026-01-06

## 2025-01-06 NOTE — PATIENT INSTRUCTIONS
Symptoms could be progressive asthma-- more persistent and more exacerbations.    You should clear with prednisone -- start now, use 2 daily for 5 days then one daily for 5 days-- need 10 days to assure cleared..    Trelegy should keep clear once cleared.. once daily    Immune testing good so far-- titers to pneumonia germ should be back soon.  1st line treatment would be frequent antibiotic. May use doxycycline regular til immune testing back.  May need immunology?    Valuim 5 mg may help palpitations.  Would use atenolol 25 mg 2 daily while having tendency for rapid heart.   Use albuterol or xopenex 2 puffs up to every 4 hrs as needed      May use codeine for cough if tolerated      May need immune doc?  May need special shots for asthma???  Check blood  off prednisone   Check breathing test  Consult immunology    Virtual visit next wk

## 2025-01-06 NOTE — PROGRESS NOTES
Subjective:      Patient ID: Mayra Smart is a 59 y.o. female.    Chief Complaint: Headache    INTERVAL HISTORY 01/06/2025  Ms. Smart presents for follow up. She reports 1 to 2 headaches per week, decreased frequency from 3 to 4 previously. She is on Botox for prevention and Ubrelvy for acute attacks. The Botox injections have achieved well over 50%  improvement in the patient's symptoms. Migraines have been reduced at least 7 days per month and the number of cumulative hours suffering with headaches has been reduced at least 100 total hours per month. She also believes that closure of her PFO has resulted in improvement as well. No change in habitual headche pattern. No red flags.    INTERVAL HISTORY 07/24/2023  Patient returns for follow up. Reports waking up with temporal pain in the morning. 5/7 days. 2/7 days she also wakes up with nausea; nausea improved with relpax. Sleeping 7-8 hrs per night; Reports restful sleep. Drinking > 60 oz's of water per day. Scheduled for vyepti next week; notes good response with 1st dose, less response with 2nd dose. Reports maxalt/trudhesa no longer effective.     INTERVAL HISTORY 1/4/2023  The patient presents for follow up. She is still in status migrainosus. This happened when she missed a dose of Ajovy. Unfortunately, even when she restarted it, the headache did not remit. I sent her for IV infusions consecutive days but this was not feasible, not very effective. Nurtec and Ubrelvy not effective. She uses Maxalt, Relpax. Acutely aware of medication overuse headache. She presents to discuss options.    INTERVAL HISTORY 12/6/22  Patient presents for follow up. Last office visit was with me a year and a half ago. She is on Botox with Dr. Kowalski and most recent Botox session was 9/23/22.    Current prevention - Botox, Ajovy (missed last two months)  Current acute - eletriptan, Ubrelvy    Today she reports she is much worse. She reports her Ajovy was discontinued  unexpectedly. She states it was not shipped automatically and she is unsure why. She has headaches in the sides of the head, eyes, between eyebrows. Current pain 8 with range 0-10. She has had a daily headache since last Wednesday. Before that she was having 4 migraines per week. Both Ajovy and Ubrelvy are currently at [facility] pending PA. She states she has taken Zofran last night without much improvement. Otherwise information below is reviewed and verified with no changes made     INTERVAL HISTORY 7/29/21  The patient location is: home  The chief complaint leading to consultation is: follow up  Visit type: audiovisual  Face to Face time with patient: 15  25 minutes of total time spent on the encounter, which includes face to face time and non-face to face time preparing to see the patient (eg, review of tests), Obtaining and/or reviewing separately obtained history, Documenting clinical information in the electronic or other health record, Independently interpreting results (not separately reported) and communicating results to the patient/family/caregiver, or Care coordination (not separately reported).   Each patient to whom he or she provides medical services by telemedicine is:  (1) informed of the relationship between the physician and patient and the respective role of any other health care provider with respect to management of the patient; and (2) notified that he or she may decline to receive medical services by telemedicine and may withdraw from such care at any time.    Notes:     Last office visit with Dr. Kowalski was almost two years ago. She has been on Botox since 9/6/19 with most recent session being 7/23/21. She is also on Emgality.     Today she reports she is a little better. She had taken almost 9 months off of Botox due to insurance. She has had two sessions on schedule again. She reports a daily headache with weekly escalations to migraine. She has several migraine abortives including  "Ubrelvy, Relpax, Zomig, and Imitrex. She feels nothing is working. She previously tried Nurtec and it was not effective. Current migraine cycle ongoing for past 6 days.     INTERVAL HISTORY 8/26/2019  The patient comes for follow up. She was doing very well on Emgality until end of May when it stopped working, now she is having headaches about 20 days per month. Using acute treatmetn almost every day. Waking up with a headache almost daily. Has never had a sleep study. Steroids in June given by her PCP provided no help. Excedrin approximately 20 per month, also taking Relpax and Advil. Relpax was recalled last week (traces of contaminant), Diamox did not help for weather related headaches. Already on Spironolactone. She has a new TMJ splint with undetermined benefit.     HPI   The patient is a pleasant 54 y/o female presenting with chief complaint of headaches. She first developed them around the age of 38 for no apparent reason. She was under the care of a neurologist for a long time. She has tried multiple medications without benefit or poor tolerability as listed below. She has tried different diets, including the "JJ diet" with no help. Hormonal adjustments ineffective as well. Drug holiday for 6 weeks has not helped. Amongst the acute medications, Relpax works the best, she has tried all other triptans, fioricet, fiorinal, and OTC analgesics.  Please see details of headache characteristics below.  Headache questionnaire    1. When did your Headaches start?    15 years ago      2. Where are your headaches located?   Temporal and behind eyes      3. Your headache's characteristics:   Pressure, Throbbing, Pounding, Stabbing, Like a tight band, Sharp      4. How long does the headache last?   days      5. How often does the headache occur?   daily      6. Are your headaches preceded or accompanied by other symptoms? yes   If yes, please describe.  Sometimes nausea      7. Does the headache awaken you at night? " yes   If so, how often? 4-5 times a night        8. Please oz the word that best describes your headache's intensity:    severe      9. Using a scale of 1 through 10, with 0 = no pain and 10 = the worst pain:   What score is your headache now? 4   What score is your headache at its worst? 10   What score is your headache at its best? 0        10. Possible associated headache symptoms:  [x]  Sensitivity to light  [x] Dizziness  [] Nasal or sinus pressure/ pain   [x] Sensitivity to noise  [x] Vertigo  [] Problems with concentration  [x] Sensitivity to smells  [] Ringing in ears  [] Problems with memory    [] Blurred vision  [] Irritability  [] Problems with task completion   [] Double vision  [] Anger  []  Problems with relaxation  [x] Loss of appetite  [] Anxiety  [x] Neck tightness, Neck pain  [x] Nausea   [] Nasal congestion  [x] Vomiting         11. Headache improving factors:  [] Sleep  [] Heat  [] Darkness  [x] Ice  [] Local pressure [] Menses (period)  [] Massage   [x] Medications:        12. Headache worsening factors:   [] Fatigue [] Sneezing  [x] Changes in Weather  [] Light [] Bending Over [] Stress  [] Noise [] Ovulation  [] Multiple Sclerosis Flare-Up  [] Smells  [] Menses  [] Food   [] Coughing [x] Alcohol      13. Number of caffeinated drinks per day: 1      14. Number of diet drinks per day:  0      15. Have you seen any other [facility] within the last 3 years?  No      Please Check any Medications Tried for Headache    AED Neuromodulators  MAOIs  Ergot Alkaloids    Acetazolamide (Diamox) [] Phenelzine (Nardil) [] Dihydroergotamine (Migranal) []   Carbamazepine (Tegretol) [] Tranylcypromine (Parnate) [] Ergotamine (Ergomar) []   Gabapentin (Neurontin) [x] Antihistamine/Serotonergic  Triptans    Lacosamide (Vimpat) [] Cyproheptadine (Periactin) [x] Almotriptan (Axert) [x]   Lamotrigine (Lamictal) [] Antihypertensives  Eletriptan (Relpax) [x]   Levatiracetam (Keppra) [] Atenolol (Tenormin)  [] Frovatriptan (Frova) [x]   Oxcarbazepine (Trileptal) [] Bisoprostol (Zebeta) [] Naratriptan (Amerge) []   Phenobarbital [] Candesartan (Atacand) [] Rizatriptan (Maxalt) [x]   Phenytoin (Dilantin) [] Diltiazem (Cardizem) [] Sumatriptan (Imitrex) [x]   Pregabalin (Lyrica) [] Lisinopril (Prinivil, Zestril) [x] Zolmitriptan (Zomig) [x]   Primidone (Mysoline) [] Metoprolol (Toprol) [x] Combo Abortives    Tiagabine (Gabatril) [] Nadolol (Corgard) [x] Butalbital and Acetaminophen (Bupap) []   Topiramate (Topamax)  (Trokendi) [x] Nicardipine (Cardene) []     Vigabatrin (Sabril) [] Nimodipine (Nimotop) [] Butalbital, Acetaminophen, and caffeine (Fioricet) [x]   Valproic Acid (Depakote) (Divalproex Sodium) [] Propranolol (Inderal) [x]     Zonisamide (Zonegran) [] Telmisartan (Micardis) [] Butalbital, Aspirin, and caffeine (Fiorinal) [x]   Benzodiazepines  Timolol (Blocadren) []     Alprazolam (Xanax) [x] Verapamil (Calan, Verelan) [] Butalbital, Caffeine, Acetaminophen, and Codeine (Fioricet with Codeine) []   Diazepam (Valium) [x] NSAIDs      Lorazepam (Ativan) [x] Acetaminophen (Tylenol) [x]     Clonazepam (Klonopin) [] Acetylsalicylic Acid (Aspirin) [x] Butalbital, Caffeine, Aspirin, and Codeine  (Fiorinal with Codeine) []   Antidepressants  Diclofenac (Cambia) []     Amitriptyline (Elavil) [] Ibuprofen (Motrin) [x]     Desipramine (Norpramin) [] Indomethacin (Indocin) [x] Aspirin, Caffeine, and Acetaminophen (Excedrin) (Goodys) []   Doxepin (Sinequan) [] Ketoprofen (Orudis) [x]     Fluoxetine (Prozac) [] Ketorolac (Toradol) [x] Acetaminophen, Dichloralphenazone, and Isometheptene (Midrin) []   Imipramine (Tofranil) [] Naproxen (Anaprox) (Aleve) [x]     Nortriptyline (Pamelor) [] Meclofenamic Acid (Meclomen) []     Diamox        Venlafaxine (Effexor) [] Meloxicam (Mobic) [] Aspirin, Salicylamide, and Caffeine (BC Powder) [x]     Review of Systems   Constitutional: Positive for fatigue. Negative for activity change,  "appetite change and fever.   HENT: Negative for congestion, dental problem, hearing loss, sinus pressure, tinnitus, trouble swallowing and voice change.    Eyes: Negative for photophobia, pain, redness and visual disturbance.   Respiratory: Negative for cough, chest tightness and shortness of breath.    Cardiovascular: Negative for chest pain, palpitations and leg swelling.   Gastrointestinal: Negative for abdominal pain, blood in stool, nausea and vomiting.   Endocrine: Positive for cold intolerance and heat intolerance.   Genitourinary: Negative for difficulty urinating, frequency, menstrual problem and urgency.   Musculoskeletal: Negative for arthralgias, back pain, gait problem, joint swelling, myalgias, neck pain and neck stiffness.   Skin: Negative.    Neurological: Positive for headaches. Negative for dizziness, tremors, seizures, syncope, facial asymmetry, speech difficulty, weakness, light-headedness and numbness.   Hematological: Negative for adenopathy. Does not bruise/bleed easily.   Psychiatric/Behavioral: Negative for agitation, behavioral problems, confusion, decreased concentration, self-injury, sleep disturbance and suicidal ideas. The patient is not nervous/anxious and is not hyperactive.      Past Medical History:   Diagnosis Date    Back pain     Bell's palsy     Breast disorder     fibercystic tissue    Fibroids     uterine    GERD (gastroesophageal reflux disease)     History of anesthesia reaction     sensitive to anesthesia, " Major hangover"    Neck pain     Pelvic pain in female     left lower quadrant    Preeclampsia      Past Surgical History:   Procedure Laterality Date     SECTION      times 2    CYSTOSCOPY N/A 2012    Performed by Terrance Pereira MD at [facility] OR    DILATION AND CURETTAGE OF UTERUS  2007    ENDOMETRIAL ABLATION  10/22/2008    BTL    HYSTERECTOMY, TOTAL, LAPAROSCOPIC N/A 2012    Performed by Terrance Pereira MD at [facility] OR    SALPINGECTOMY, " LAPAROSCOPIC Right 11/19/2012    Performed by Terrance Pereira MD at [facility] OR    SALPINGO-OOPHORECTOMY Left 11/19/2012    Performed by Terrance Pereira MD at [facility] OR     Family History   Problem Relation Age of Onset    Breast cancer Neg Hx     Ovarian cancer Neg Hx     Pulmonary embolism Mother      Social History     Socioeconomic History    Marital status:      Spouse name: Not on file    Number of children: Not on file    Years of education: Not on file    Highest education level: Not on file   Social Needs    Financial resource strain: Not on file    Food insecurity - worry: Not on file    Food insecurity - inability: Not on file    Transportation needs - medical: Not on file    Transportation needs - non-medical: Not on file   Occupational History    Not on file   Tobacco Use    Smoking status: Never Smoker   Substance and Sexual Activity    Alcohol use: Yes     Comment: ocassionally    Drug use: No    Sexual activity: Yes   Other Topics Concern    Not on file   Social History Narrative    Not on file     Review of patient's allergies indicates:   Allergen Reactions    Cephalosporins Anaphylaxis    Penicillins Anaphylaxis    Codeine Itching and Swelling     Current Outpatient Medications:     eletriptan (RELPAX) 40 MG tablet, Take 40 mg by mouth as needed. may repeat in 2 hours if necessary , Disp: , Rfl:     levothyroxine (TIROSINT) 75 mcg Cap, Take 75 mcg by mouth once daily.  , Disp: , Rfl:     liothyronine (CYTOMEL) 5 MCG Tab, , Disp: , Rfl:     MINIVELLE 0.05 mg/24 hr, , Disp: , Rfl:     progesterone (PROMETRIUM) 100 MG capsule, TK 1 C PO QHS, Disp: , Rfl: 6    spironolactone (ALDACTONE) 50 MG tablet, , Disp: , Rfl:     VENTOLIN HFA 90 mcg/actuation inhaler, , Disp: , Rfl:     DEXLANSOPRAZOLE (DEXILANT ORAL), Take by mouth every morning. , Disp: , Rfl:     eletriptan (RELPAX) 40 MG tablet, may repeat in 2 hours if necessary. Patient has tried and failed all the other triptans, Disp: 9 tablet, Rfl:  5    galcanezumab-gnlm (EMGALITY) 120 mg/mL PnIj, Inject 120 mg into the skin every 28 days., Disp: 1 Syringe, Rfl: 5    ketorolac (SPRIX) 15.75 mg/spray Spry, 15.75 mg by Nasal route every 6 (six) hours., Disp: 5 each, Rfl: 3      Objective:      Vitals:    01/06/25 0947   BP: (!) 132/90   Pulse: 83   Resp: 17     Body mass index is 25.15 kg/m².      Physical Exam  Constitutional:   She appears well-developed and well-nourished. She is well groomed. Appears in significant pain. Sunglasses on and lights in room dimmed to as low as possible.      Neurological Exam:  General: well-developed, well-nourished  Tenderness to palpation of the auriculotemporal and left supraorbital nerves  Mental status: Awake and alert  Speech language: No dysarthria or aphasia on conversation  Cranial nerves: Face symmetric  Motor: Moves all extremities well  Coordination: No ataxia. No tremor.    Review of Data:  Lab Results   Component Value Date     08/15/2024    K 3.9 08/15/2024    MG 2.1 08/15/2024     08/15/2024    CO2 24 08/15/2024    BUN 16 08/15/2024    CREATININE 0.76 08/15/2024     (H) 08/15/2024    HGBA1C 5.2 05/22/2024    AST 26 08/15/2024    ALT 31 08/15/2024    ALBUMIN 4.4 08/15/2024    PROT 7.3 08/15/2024    BILITOT 0.4 08/15/2024    CHOL 168 05/22/2024    HDL 60 05/22/2024    LDLCALC 96.8 05/22/2024    TRIG 56 05/22/2024       Lab Results   Component Value Date    WBC 7.78 08/15/2024    HGB 14.5 08/15/2024    HCT 42.8 08/15/2024    MCV 89 08/15/2024     (H) 08/15/2024       Lab Results   Component Value Date    TSH 1.068 05/22/2024         Assessment and Plan     The patient has chronic migraines ( G43.719) and suffers from headaches more than 15 days a month lasting more than 4 hours a day with no relief of symptoms despite trying multiple medications as listed above (Gabapentin, Topamax, Toprol, Propranolol, Lisinopril, Emgality). No longer responding to Trudhesa/Maxalt; discontinued. Has good  response with relpax, however, having difficulty receiving > 6 tablets per month. As this is medication is providing the most therapeutic benefit, will increase monthly prescription. In addition, advised patient to continue vyepti infusion next week.     Patient complaining of flare during visit; provided analgesia via auriculotemporal nerve blocks bilaterally with great response. Due to new morning headache pattern, suspect myofascial pain or muscle spasms are contributing. Prescribed Skelaxin 400 mg QHS. As patient is also benefiting from botox, will add additional injection point to bilateral temporal regions for additional muscular release.     The Botox injections have achieved well over 50%  improvement in the patient's symptoms. Migraines have been reduced at least 7 days per month and the number of cumulative hours suffering with headaches has been reduced at least 100 total hours per month.   However, there is still room for improvement.  Start Qulipta 30 mg po daily with dinner with plans to increase to 60 mg depending upon tolerability    I have discussed the side effects of the medications prescribed and the patient acknowledges understanding    [facility] 1/31/2025 for Botox treatment      Hemanth Kowalski M.D  Medical Director, Headache and Facial Pain  [facility]

## 2025-01-06 NOTE — PROGRESS NOTES
The patient location is: 41 min  The chief complaint leading to consultation is: asthma    Visit type: audiovisual    Face to Face time with patient: 41 min  41 minutes of total time spent on the encounter, which includes face to face time and non-face to face time preparing to see the patient (eg, review of tests), Obtaining and/or reviewing separately obtained history, Documenting clinical information in the electronic or other health record, Independently interpreting results (not separately reported) and communicating results to the patient/family/caregiver, or Care coordination (not separately reported).         Each patient to whom he or she provides medical services by telemedicine is:  (1) informed of the relationship between the physician and patient and the respective role of any other health care provider with respect to management of the patient; and (2) notified that he or she may decline to receive medical services by telemedicine and may withdraw from such care at any time.    Notes:      1/6/2025    Mayra Smart  New Patient Consult    Chief Complaint   Patient presents with    Follow-up    Asthma       HPI:   1/6/2025 pt had had 5 resp problems -- last illness was fever to 101-, sore throat, coughing, mucous was yellow brown when ill -- cleared color with  doxy but cough lingered and was severe    Pt was dosed on trelegy and had good response.  Stopped trelegy.    Pt will dose prednisone with good response.  Improves cough but not completely.      Took 1/2 valium as cough was severe with clear benefit.    Pt appreciates worsening at night.    Pt had pneumonia and flu vaccine.    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   Will have problems less than once a yr typically.    Pt had had covid once to twice yearly --  pt declines wheezes and not really nightly worsening.    Pt works teacher and runs [employer].    Pt has had  "cough lingering-- pt had cough flares with covid that would clear after 8 weeks.  But now lingering since last covid July 12th....    Pt will have chest tightness -- relieved with symbicort and albuterol.      No chr sinus. Pt [facility] various [facility] -- multi allergies      the patient had a patent foramen ovale (had migraines and may have had stroke ?) closed in July with decrease in migranes from daily to once a week..   -she has been having atrial fib and tachycardia problem subsequent.  The patient had screening CT scan September with no worrisome findings.  A liver hemangioma was seen.  Pt off xarelto but uses if in a fib...    Albuterol ppt some heart racing    Her mother had a pulmonary embolus.  Patient is on anticoagulant with atrial fib.  Patient Instructions   You relate recurrent lung infections-- will check immune system-- you had prior pneumonia vaccine..      You have cough variant -- was intermittent and now persistent. Moderate now with no controller...      Would treat for asthma-- controller --use trelegy once daily or symbicort 2 twice daily - rinse mouth   Rescue-- Use xopenex or albuterol for cough    Action plan---Use prednisone if needed-- to remit cough or if xopenex not effective---- use one or two with taper -- stop and use controller to keep remitted.    May use doxycycline if yellow mucous/infection-- avoid sunshine    Ct chest viewed- mildly thickened airways seen, ninimal nodules -- no follow up needed...      Would do pulmonary function if not clear of symptoms    Allergy exposure might cause more generalized asthma??????       PFSH:  Past Medical History:   Diagnosis Date    Back pain     Bell's palsy     during pregnancy    Breast disorder     fibercystic tissue    Fibroids     uterine    GERD (gastroesophageal reflux disease)     Headache     History of anesthesia reaction     sensitive to anesthesia, " Major hangover"    Neck pain     Pelvic pain in female     left " "lower quadrant    Preeclampsia     Thyroid disease          Past Surgical History:   Procedure Laterality Date     SECTION      times 2    CLOSURE, PFO  2024    Procedure: Closure, PFO;  Surgeon: Yoel Mccracken MD;  Location: [facility] CATH;  Service: Cardiology;;    COLONOSCOPY      x2    COLONOSCOPY N/A 2023    Procedure: COLONOSCOPY;  Surgeon: Lloyd Fleming MD;  Location: [facility] ENDO;  Service: Gastroenterology;  Laterality: N/A;    DILATION AND CURETTAGE OF UTERUS  2007    ENDOMETRIAL ABLATION  10/22/2008    BTL    HYSTERECTOMY      INTRACARDIAC ECHOCARDIOGRAPHY  2024    Procedure: Intracardiac echocardiogram;  Surgeon: Yoel Mccracken MD;  Location: [facility] CATH;  Service: Cardiology;;    OOPHORECTOMY      REDUCTION OF BOTH BREASTS       Social History     Tobacco Use    Smoking status: Never     Passive exposure: Never    Smokeless tobacco: Never   Substance Use Topics    Alcohol use: Yes     Comment: ocassionally    Drug use: Never     Family History   Problem Relation Name Age of Onset    Pulmonary embolism Mother      Skin cancer Mother      Skin cancer Father      Thyroid disease Sister      Heart disease Brother      Breast cancer Neg Hx      Ovarian cancer Neg Hx       Review of patient's allergies indicates:   Allergen Reactions    Cephalosporins Anaphylaxis    Penicillins Anaphylaxis    Codeine Itching and Swelling    Opioids - morphine analogues      Vertigo, nausea, itchy throat, tongue enlgmt, rash    Vicoprofen [hydrocodone-ibuprofen] Other (See Comments)     Severe nausea          Review of Systems:  a review of eleven systems covering constitutional, Eye, HEENT, Psych, Respiratory, Cardiac, GI, , Musculoskeletal, Endocrine, Dermatologic was negative except for pertinent findings as listed ABOVE and below:  pertinent positives as above, rest good       Exam:Comprehensive exam done. Ht 5' 4" (1.626 m)   Wt 66.4 kg (146 lb 6.2 oz)   LMP 10/22/2008   BMI 25.13 kg/m²   " Exam included Vitals as listed, and patient's appearance and affect and alertness and mood, oral exam for yeast and hygiene and pharynx lesions and Mallapatti (M) score, neck with inspection for jvd and masses and thyroid abnormalities and lymph nodes (supraclavicular and infraclavicular nodes and axillary also examined and noted if abn), chest exam included symmetry and effort and fremitus and percussion and auscultation, cardiac exam included rhythm and gallops and murmur and rubs and jvd and edema, abdominal exam for mass and hepatosplenomegaly and tenderness and hernias and bowel sounds, Musculoskeletal exam with muscle tone and posture and mobility/gait and  strength, and skin for rashes and cyanosis and pallor and turgor, extremity for clubbing.  Findings were normal except for pertinent findings listed below:  M3, chest is symmetric, no distress, normal percussion, normal fremitus and good normal breath sounds           Radiographs (ct chest and cxr) reviewed: view by direct vision      Labs reviewed           PFT will be done and results to be reviewed       Plan:  Clinical impression is apparently straight forward and impression with management as below.    Mayra was seen today for follow-up and asthma.    Diagnoses and all orders for this visit:    Severe persistent asthma, uncomplicated  -     levalbuterol (XOPENEX HFA) 45 mcg/actuation inhaler; Inhale 1-2 puffs into the lungs every 4 (four) hours as needed for Wheezing. This is a rescue inhaler medication and should be used as needed  -     CBC auto differential; Future  -     Spirometry with/without bronchodilator; Future  -     diazePAM (VALIUM) 5 MG tablet; Take 1 tablet (5 mg total) by mouth every 8 (eight) hours as needed for Anxiety.  -     guaiFENesin-codeine 100-10 mg/5 ml (TUSSI-ORGANIDIN NR)  mg/5 mL syrup; Take 10 mLs by mouth 3 (three) times daily as needed for Cough.  -     doxycycline (VIBRAMYCIN) 100 MG Cap; Take 1 capsule  (100 mg total) by mouth 2 (two) times daily.  -     X-Ray Chest PA And Lateral; Future  -     predniSONE (DELTASONE) 20 MG tablet; 2 for 5 days then one for 5 days and repeat for breathing problems  -     atenoloL (TENORMIN) 25 MG tablet; Take 2 tablets (50 mg total) by mouth every evening. As instructed  -     Ambulatory referral/consult to Allergy; Future    Moderate persistent asthma, uncomplicated    Palpitations  -     diazePAM (VALIUM) 5 MG tablet; Take 1 tablet (5 mg total) by mouth every 8 (eight) hours as needed for Anxiety.  -     atenoloL (TENORMIN) 25 MG tablet; Take 2 tablets (50 mg total) by mouth every evening. As instructed          Follow up in about 1 week (around 1/13/2025), or if symptoms worsen or fail to improve, for Virtual Visit.    Discussed with patient above for education the following:      Patient Instructions   Symptoms could be progressive asthma-- more persistent and more exacerbations.    You should clear with prednisone -- start now, use 2 daily for 5 days then one daily for 5 days-- need 10 days to assure cleared..    Trelegy should keep clear once cleared.. once daily    Immune testing good so far-- titers to pneumonia germ should be back soon.  1st line treatment would be frequent antibiotic. May use doxycycline regular til immune testing back.  May need immunology?    Valuim 5 mg may help palpitations.  Would use atenolol 25 mg 2 daily while having tendency for rapid heart.   Use albuterol or xopenex 2 puffs up to every 4 hrs as needed      May use codeine for cough if tolerated      May need immune doc?  May need special shots for asthma???  Check blood  off prednisone   Check breathing test  Consult immunology    Virtual visit next wk    Evaluation took 41 minutes

## 2025-01-07 LAB — C TETANI TOXOID AB SER-ACNC: >5.33 IU/ML

## 2025-01-09 ENCOUNTER — HOSPITAL ENCOUNTER (OUTPATIENT)
Dept: RADIOLOGY | Facility: HOSPITAL | Age: 60
Discharge: HOME | End: 2025-01-09
Attending: INTERNAL MEDICINE
Payer: COMMERCIAL

## 2025-01-09 DIAGNOSIS — J45.50 SEVERE PERSISTENT ASTHMA, UNCOMPLICATED: ICD-10-CM

## 2025-01-09 LAB
C DIPHTHERIAE AB SER IA-ACNC: 0.46 IU/ML
DEPRECATED S PNEUM12 IGG SER-MCNC: <0.3 UG/ML
DEPRECATED S PNEUM23 IGG SER-MCNC: 0.9 UG/ML
DEPRECATED S PNEUM4 IGG SER-MCNC: 0.3 UG/ML
DEPRECATED S PNEUM8 IGG SER-MCNC: 0.3 UG/ML
DEPRECATED S PNEUM9 IGG SER-MCNC: 0.8 UG/ML
S PN DA SERO 19F IGG SER-MCNC: 0.6 UG/ML
S PNEUM DA 1 IGG SER-MCNC: <0.3 UG/ML
S PNEUM DA 14 IGG SER-MCNC: 1.1
S PNEUM DA 18C IGG SER-MCNC: 2.4
S PNEUM DA 3 IGG SER-MCNC: <0.3 UG/ML
S PNEUM DA 5 IGG SER-MCNC: 0.7 UG/ML
S PNEUM DA 6B IGG SER-MCNC: 0.7 UG/ML
S PNEUM DA 7F IGG SER-MCNC: 2.6 UG/ML
S PNEUM DA 9V IGG SER-MCNC: 0.6 UG/ML

## 2025-01-09 PROCEDURE — 71046 X-RAY EXAM CHEST 2 VIEWS: CPT | Mod: TC,FY,PO

## 2025-01-09 PROCEDURE — 71046 X-RAY EXAM CHEST 2 VIEWS: CPT | Mod: 26,,, | Performed by: RADIOLOGY

## 2025-01-13 ENCOUNTER — PATIENT MESSAGE (OUTPATIENT)
Dept: PULMONOLOGY | Facility: CLINIC | Age: 60
End: 2025-01-13
Payer: COMMERCIAL

## 2025-01-13 DIAGNOSIS — D84.9 IMMUNE DEFICIENCY DISORDER: Primary | ICD-10-CM

## 2025-01-13 NOTE — PROGRESS NOTES
Notify protection to pneumonia germ was only 2/14 despite having a pneumonia vaccine in the past.    Depression should be evaluated by immunology given that she has had so much trouble with infections.  Walk consult immunology.

## 2025-01-13 NOTE — PROGRESS NOTES
"ALLERGY & IMMUNOLOGY CLINIC -  NEW PATIENT     HISTORY OF PRESENT ILLNESS     Patient ID: Mayra Smart is a 59 y.o. female    CC:   Chief Complaint   Patient presents with    Allergies       HPI: Mayra Smart is a 59 y.o. female presents for evaluation of:    01/14/2025  Previous patient of Dr. Forrest referred for evaluation of asthma at request of Dr. Salgado. Previous allergy testing was most notable for sensitization to dust mites, animals and grasses. Endorses recurrent respiratory illnesses which she states "goes straight to her lungs." Has felt like symptoms have worsened since approximately 2020 during the Covid pandemic. 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 Recalls shortness of breath specifically with exertion. Started on trelegy which has relieved symptoms partially. Denies sinus infections, denies pneumonia. Denies hospitalizations. Denies IV antibiotics. Scheduled for spirometry and no recent Eosinophilia noted on CBC with differential. Normal IgG and IgM. Strep Pneumo 2/14 protection, no Pneumovax has been administered. Currently completing course of Prednisone per Pulmonary      LOV Dr. Forrest 2021  56 yo woman presents for new patient evaluation of allergies. She states she has H/o cat and dog allergy as teen but seemed to improve. She has pets and do not bother her. 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 She has H/o allergy induced asthma, most has been triggered by mold in past but recently with small amount of milk within minutes she is tight and SOB. albuterol helps. She has tried lactose free milk and seems to tolerate. Then few days ago she came home from work and had 1.5 hour long sneeze attack. Also had congestion and some runny nose. She took benadryl and sudafed. 2 years ago she was in [place] and place had mold and she had severe SOB and tightness there, had to dustin albuterol inhaler frequently. No eczema. " "No known food, insect or latex allergy. She had throat swelling with keflex before and has large swelling form insect stings but does not have Epipen.      REVIEW OF SYSTEMS     CONST: no F/C/NS, no unintentional weight changes  Balance of review of systems negative except as mentioned above     MEDICAL HISTORY     MedHx: active problems reviewed  SurgHx:   Past Surgical History:   Procedure Laterality Date     SECTION      times 2    CLOSURE, PFO  2024    Procedure: Closure, PFO;  Surgeon: Yoel Mccracken MD;  Location: [facility] CATH;  Service: Cardiology;;    COLONOSCOPY      x2    COLONOSCOPY N/A 2023    Procedure: COLONOSCOPY;  Surgeon: Lloyd Fleming MD;  Location: [facility] ENDO;  Service: Gastroenterology;  Laterality: N/A;    DILATION AND CURETTAGE OF UTERUS  2007    ENDOMETRIAL ABLATION  10/22/2008    BTL    HYSTERECTOMY      INTRACARDIAC ECHOCARDIOGRAPHY  2024    Procedure: Intracardiac echocardiogram;  Surgeon: Yoel Mccracken MD;  Location: [facility] CATH;  Service: Cardiology;;    OOPHORECTOMY      REDUCTION OF BOTH BREASTS         SocHx:   -Smoking: Denies    FamHx:   no Family History of asthma, allergic rhinitis, atopic dermatitis, drug allergy, food allergy, venom allergy or immune deficiency.     Allergies: see below  Medications: MAR reviewed       PHYSICAL EXAM     VS: Pulse 78   Ht 5' 4" (1.626 m)   Wt 69.6 kg (153 lb 5.3 oz)   LMP 10/22/2008   SpO2 98%   BMI 26.32 kg/m²   GENERAL: awake, alert, cooperative with exam  LUNGS: CTAB, no w/r/c, no increased WOB  HEART: Normal Rate and regular rhythm, normal S1/S2, no m/g/r  EXTREMITIES: +2 distal pulses, no c/c/e  DERM: no rashes, no skin breaks     ALLERGEN TESTING     Component      Latest Ref Rng 2021   Cat Dander IgE      <0.10 kU/L 0.44 (H)    Cat Epithelium Class CLASS 1    Dog Dander IgE      <0.10 kU/L 0.41 (H)    Dog Dander Class CLASS 1    Cockroach, IgE      <0.10 kU/L 0.19 (H)    Cockroach, IgE       CLASS " 0/1    D. farinae      <0.10 kU/L 5.77 (H)    D. farinae Class CLASS 3    D. pteronyssinus Dust Mite IgE      <0.10 kU/L 5.75 (H)    D. pteronyssinus Class CLASS 3    Bahia Grass IgE      <0.10 kU/L 0.25 (H)    Bahia Class CLASS 0/1    Bermuda Grass IgE      <0.10 kU/L 0.11 (H)    [place]ermuda Grass Class CLASS 0/1    Ernesto Grass IgE      <0.10 kU/L 0.19 (H)    [place]esto Grass Class CLASS 0/1    Kodi Grass IgE      <0.10 kU/L 0.71 (H)    Kodi Grass Class CLASS 2    A. alternata IgE      <0.10 kU/L 0.17 (H)    Ragweed, Western IgE      <0.10 kU/L 0.11 (H)    Ragweed, Western, Class CLASS 0/1    Russian Thistle IgE      <0.10 kU/L 0.11 (H)    Russian Thistle Class CLASS 0/1    Horse Dander IgE      <0.10 kU/L 0.49 (H)    Horse Dander Class CLASS 1    Milk IgE      <0.10 kU/L <0.10    Cow's Milk Class CLASS 0    Cow Dander      <0.10 kU/L 0.29 (H)    Cow Class CLASS 0/1    Gerbil      <0.10 kU/L <0.10    Gerbil IgE Class 0    Guinea Pig      <0.10 kU/L 1.44 (H)    Guinea Pig Epith. Class CLASS 2    Rabbit Epith. IgE      <0.10 kU/L 2.26 (H)    Rabbit Epith. Class CLASS 2    Latex      <0.10 kU/L <0.10    Latex Class CLASS 0    Fire Ant      <0.10 kU/L 0.64 (H)    Fire Ant Class CLASS 1    White-Faced Hornet IgE      <0.10 kU/L 0.28 (H)    White Faced Hornet Class CLASS 0/1    Honeybee Venom      <0.10 kU/L 0.23 (H)    Honeybee Class CLASS 0/1    Yellow Hornet IgE      <0.10 kU/L 0.23 (H)    Yel Faced Horn. Class CLASS 0/1    Common Wasp IgE      <0.10 kU/L 0.30 (H)    Yellow Jacket Class CLASS 0/1    Wasp Venom IgE      <0.10 kU/L 1.73 (H)    Wasp Class CLASS 2            1/2/2025   S.pneumoniae Type 1 <0.3    S.pneumoniae Type 3 <0.3    Strep pneumo Type 4 0.3    S.pneumoniae Type 5 0.7    S.pneumoniae Type 8 0.3    S.pneumoniae Type 9N 0.8    S.pneumoniae Type 12F <0.3    Strep pneumo Type 14 1.1    S.pneumoniae Type 19F 0.6    S.pneumoniae Type 23F 0.9    S.pneumoniae Type 6B 0.7    S.pneumoniae Type 7F 2.6   "  S.pneumoniae Type 18C 2.4    S.pneumoniae Type 9V Abs 0.6      Normal CT 9/2024       ASSESSMENT/PLAN     Mayra Smart is a 59 y.o. female with       1. Chronic allergic rhinitis    2. Severe persistent asthma, uncomplicated    3. Recurrent infections      History of recurrent lower respiratory tract infections likely of viral etiology with findings of sub-optimal pneumococcal protection  Recommend PPSV-23 administration and rechecking titers 4-6 weeks after, given current use of prednisone will wait until completion to administer  Discussed infectious mitigation including mask wearing, social distancing. Expresses concerns as she frequently travels out of the country and feels like she develops an illness every time she flies overseas  Inquired about biologic treatment vs allergen immunotherapy; discussed that these are most beneficial to prevent exacerbations of asthma and are not "immune boosting" to provide protection against viral illnesses but rather exacerbations related to respiratory illnesses, allergies, etc.   Continue Trelegy daily and albuterol as needed, agree with spirometry for baseline    Follow up: 3 months      Balwinder Natarajan MD    I spent a total of 60 minutes on the day of the visit. This includes face to face time and non-face to face time preparing to see the patient (eg, review of tests), obtaining and/or reviewing separately obtained history, documenting clinical information in the electronic or other health record, independently interpreting results and communicating results to the patient/family/caregiver, or care coordinator.    "

## 2025-01-14 ENCOUNTER — OFFICE VISIT (OUTPATIENT)
Dept: ALLERGY | Facility: CLINIC | Age: 60
End: 2025-01-14
Payer: COMMERCIAL

## 2025-01-14 VITALS — BODY MASS INDEX: 26.17 KG/M2 | WEIGHT: 153.31 LBS | HEART RATE: 78 BPM | HEIGHT: 64 IN | OXYGEN SATURATION: 98 %

## 2025-01-14 DIAGNOSIS — J30.9 CHRONIC ALLERGIC RHINITIS: Primary | ICD-10-CM

## 2025-01-14 DIAGNOSIS — B99.9 RECURRENT INFECTIONS: ICD-10-CM

## 2025-01-14 DIAGNOSIS — J45.50 SEVERE PERSISTENT ASTHMA, UNCOMPLICATED: ICD-10-CM

## 2025-01-14 PROCEDURE — 99205 OFFICE O/P NEW HI 60 MIN: CPT | Mod: S$GLB,,, | Performed by: STUDENT IN AN ORGANIZED HEALTH CARE EDUCATION/TRAINING PROGRAM

## 2025-01-14 PROCEDURE — 1159F MED LIST DOCD IN RCRD: CPT | Mod: CPTII,S$GLB,, | Performed by: STUDENT IN AN ORGANIZED HEALTH CARE EDUCATION/TRAINING PROGRAM

## 2025-01-14 PROCEDURE — 3008F BODY MASS INDEX DOCD: CPT | Mod: CPTII,S$GLB,, | Performed by: STUDENT IN AN ORGANIZED HEALTH CARE EDUCATION/TRAINING PROGRAM

## 2025-01-14 PROCEDURE — 99999 PR PBB SHADOW E&M-EST. PATIENT-LVL V: CPT | Mod: PBBFAC,,, | Performed by: STUDENT IN AN ORGANIZED HEALTH CARE EDUCATION/TRAINING PROGRAM

## 2025-01-16 ENCOUNTER — OFFICE VISIT (OUTPATIENT)
Dept: FAMILY MEDICINE | Facility: CLINIC | Age: 60
End: 2025-01-16
Payer: COMMERCIAL

## 2025-01-16 ENCOUNTER — LAB VISIT (OUTPATIENT)
Dept: LAB | Facility: HOSPITAL | Age: 60
End: 2025-01-16
Attending: FAMILY MEDICINE
Payer: COMMERCIAL

## 2025-01-16 VITALS
BODY MASS INDEX: 25.82 KG/M2 | WEIGHT: 151.25 LBS | TEMPERATURE: 98 F | HEART RATE: 80 BPM | SYSTOLIC BLOOD PRESSURE: 110 MMHG | OXYGEN SATURATION: 99 % | HEIGHT: 64 IN | DIASTOLIC BLOOD PRESSURE: 74 MMHG

## 2025-01-16 DIAGNOSIS — I48.0 PAROXYSMAL ATRIAL FIBRILLATION: ICD-10-CM

## 2025-01-16 DIAGNOSIS — Z87.74 S/P PATENT FORAMEN OVALE CLOSURE: ICD-10-CM

## 2025-01-16 DIAGNOSIS — G43.719 INTRACTABLE CHRONIC MIGRAINE WITHOUT AURA AND WITHOUT STATUS MIGRAINOSUS: ICD-10-CM

## 2025-01-16 DIAGNOSIS — E06.3 HYPOTHYROIDISM DUE TO HASHIMOTO'S THYROIDITIS: Primary | ICD-10-CM

## 2025-01-16 DIAGNOSIS — J45.40 MODERATE PERSISTENT ASTHMA, UNCOMPLICATED: ICD-10-CM

## 2025-01-16 DIAGNOSIS — E06.3 HYPOTHYROIDISM DUE TO HASHIMOTO'S THYROIDITIS: ICD-10-CM

## 2025-01-16 LAB
T3FREE SERPL-MCNC: 3.3 PG/ML (ref 2.3–4.2)
T4 FREE SERPL-MCNC: 0.8 NG/DL (ref 0.71–1.51)
TSH SERPL DL<=0.005 MIU/L-ACNC: 0.3 UIU/ML (ref 0.4–4)

## 2025-01-16 PROCEDURE — 84481 FREE ASSAY (FT-3): CPT | Performed by: FAMILY MEDICINE

## 2025-01-16 PROCEDURE — 84439 ASSAY OF FREE THYROXINE: CPT | Performed by: FAMILY MEDICINE

## 2025-01-16 PROCEDURE — 3008F BODY MASS INDEX DOCD: CPT | Mod: CPTII,S$GLB,, | Performed by: FAMILY MEDICINE

## 2025-01-16 PROCEDURE — 36415 COLL VENOUS BLD VENIPUNCTURE: CPT | Mod: PO | Performed by: FAMILY MEDICINE

## 2025-01-16 PROCEDURE — 1160F RVW MEDS BY RX/DR IN RCRD: CPT | Mod: CPTII,S$GLB,, | Performed by: FAMILY MEDICINE

## 2025-01-16 PROCEDURE — 99999 PR PBB SHADOW E&M-EST. PATIENT-LVL V: CPT | Mod: PBBFAC,,, | Performed by: FAMILY MEDICINE

## 2025-01-16 PROCEDURE — 1159F MED LIST DOCD IN RCRD: CPT | Mod: CPTII,S$GLB,, | Performed by: FAMILY MEDICINE

## 2025-01-16 PROCEDURE — 3078F DIAST BP <80 MM HG: CPT | Mod: CPTII,S$GLB,, | Performed by: FAMILY MEDICINE

## 2025-01-16 PROCEDURE — 99214 OFFICE O/P EST MOD 30 MIN: CPT | Mod: S$GLB,,, | Performed by: FAMILY MEDICINE

## 2025-01-16 PROCEDURE — 3074F SYST BP LT 130 MM HG: CPT | Mod: CPTII,S$GLB,, | Performed by: FAMILY MEDICINE

## 2025-01-16 PROCEDURE — 84443 ASSAY THYROID STIM HORMONE: CPT | Performed by: FAMILY MEDICINE

## 2025-01-16 NOTE — PROGRESS NOTES
"Subjective:       Patient ID: Mayra Smart is a 59 y.o. female.    Chief Complaint: Establish Care    Here today to establish care with a new PCP.   She was seeing Dr. MARQUIS Myers who has gone into  medicine.  Her last well visit was in May 2024      Immunizations: Due Prenvar and Shingrix  Last Lab work: 2025  Colon Ca screening: Colonoscopy 2023   Breast Ca Screening: MMG 2024  Cervical Ca Screening: s/p Hyst    Hypothyroidism:  On Thyroid replacement with NP thyroid.  Last TSH in May  Migraine Headaches: Seeing Neurology.  Getting Botox.   Having 2-3 migraines per week.  Decreased since PFO closure.  Recently prescribed Qulipta for prevention and Ubverly PRN  Asthma: now seeing Pulm and Allergy.  She is on Trelegy and Xopenex PRN  patent foramen ovale :  closed in July.   -she has been having atrial fib and tachycardia problem subsequent.  If she goes into A.fib, she takes Xeralto, Flecainide and Atenolol PRN.  She is on DAPT with ASA and Plavix.  HLD:  She is on Lipitor.  Cholesterol to goal in May 2024      Review of Systems   Constitutional:  Negative for activity change, appetite change, fatigue and fever.   Respiratory:  Negative for cough, shortness of breath and wheezing.    Cardiovascular:  Negative for chest pain and palpitations.   Gastrointestinal:  Negative for abdominal pain, constipation, diarrhea and nausea.   Skin:  Negative for pallor and rash.   Neurological:  Negative for dizziness, syncope, light-headedness and headaches.       Objective:      Vitals:    01/16/25 0921   BP: 110/74   Pulse: 80   Temp: 97.9 °F (36.6 °C)   TempSrc: Oral   SpO2: 99%   Weight: 68.6 kg (151 lb 3.8 oz)   Height: 5' 4" (1.626 m)      Physical Exam  Constitutional:       General: She is not in acute distress.  Cardiovascular:      Rate and Rhythm: Normal rate and regular rhythm.      Heart sounds: Normal heart sounds. No murmur heard.  Pulmonary:      Effort: Pulmonary effort is normal. No respiratory " distress.      Breath sounds: Normal breath sounds. No wheezing, rhonchi or rales.   Skin:     General: Skin is warm and dry.   Neurological:      General: No focal deficit present.      Mental Status: She is alert.   Psychiatric:         Mood and Affect: Mood normal.         Behavior: Behavior normal.         Thought Content: Thought content normal.            Assessment:       1. Hypothyroidism due to Hashimoto's thyroiditis    2. Moderate persistent asthma, uncomplicated    3. S/P patent foramen ovale closure    4. Intractable chronic migraine without aura and without status migrainosus    5. Paroxysmal atrial fibrillation        Plan:       Hypothyroidism due to Hashimoto's thyroiditis  -     TSH; Future; Expected date: 01/16/2025  -     T3, FREE; Future; Expected date: 01/16/2025  -     T4, FREE; Future; Expected date: 01/16/2025  Thyroid has not been rechecked since her PFO closure and subsequent A.fib.  Will recheck today.  Continue NP thyroid.  Moderate persistent asthma, uncomplicated  Continue Trelegy and further management per Pulm  S/P patent foramen ovale closure  Continue DAPT for 1 year per Cardiology recommendation  Intractable chronic migraine without aura and without status migrainosus  Start Qulipta for prevention  Paroxysmal atrial fibrillation  May want to see EP cardiology if she has another bout of A. Fib.  In NSR at this time      F/U in 4 months for well visit        Medication List with Changes/Refills   Current Medications    ASPIRIN (ECOTRIN) 81 MG EC TABLET    Take 81 mg by mouth once daily.    ATENOLOL (TENORMIN) 25 MG TABLET    Take 2 tablets (50 mg total) by mouth every evening. As instructed    ATOGEPANT (QULIPTA) 30 MG TAB    Take 1 tablet (30 mg total) by mouth once daily.    ATORVASTATIN (LIPITOR) 10 MG TABLET    TAKE 1 TABLET(10 MG) BY MOUTH EVERY DAY    CLOPIDOGREL (PLAVIX) 75 MG TABLET    Take 75 mg by mouth once daily.    DIAZEPAM (VALIUM) 5 MG TABLET    Take 1 tablet (5 mg  total) by mouth every 8 (eight) hours as needed for Anxiety.    EPINEPHRINE (EPIPEN) 0.3 MG/0.3 ML ATIN    Inject 0.3 mLs (0.3 mg total) into the muscle once as needed.    ESTRADIOL 0.05 MG/24 HR TD PTSW (VIVELLE-DOT) 0.05 MG/24 HR    Place 1 patch onto the skin twice a week.    FINASTERIDE (PROPECIA) 1 MG TABLET    TAKE 1 TABLET(1 MG) BY MOUTH DAILY    FLECAINIDE (TAMBOCOR) 150 MG TAB    Take 1 tablet (150 mg total) by mouth daily as needed (afib).    FLUTICASONE-UMECLIDIN-VILANTER (TRELEGY ELLIPTA) 200-62.5-25 MCG INHALER    Inhale 1 puff into the lungs once daily.    KETOCONAZOLE (NIZORAL) 2 % SHAMPOO    Lather into affected areas. Rinse off in 5-10 min. Repeat 2-3 times a week.    LEVALBUTEROL (XOPENEX HFA) 45 MCG/ACTUATION INHALER    Inhale 1-2 puffs into the lungs every 4 (four) hours as needed for Wheezing. This is a rescue inhaler medication and should be used as needed    MINOXIDIL (LONITEN) 2.5 MG TABLET    TAKE 1/2 TABLET(1.25 MG) BY MOUTH DAILY    PREDNISONE (DELTASONE) 20 MG TABLET    2 for 5 days then one for 5 days and repeat for breathing problems    PROGESTERONE (PROMETRIUM) 100 MG CAPSULE    Take 1 capsule (100 mg total) by mouth every evening.    RIVAROXABAN (XARELTO) 20 MG TAB    Take 1 tablet (20 mg total) by mouth every evening.    THYROID, PORK, (NP THYROID) 60 MG TAB    TAKE 1 TABLET BY MOUTH EVERY DAY    TRIAMCINOLONE ACETONIDE 0.1% (KENALOG) 0.1 % CREAM    Apply topically 2 (two) times daily.    UBROGEPANT (UBRELVY) 100 MG TABLET    Start Ubrelvy 100 mg for headache attacks. May repeat once in 1 hour to a max of 2 per day.   Discontinued Medications    BUDESONIDE-FORMOTEROL 160-4.5 MCG (SYMBICORT) 160-4.5 MCG/ACTUATION HFAA    Inhale 2 puffs into the lungs every 12 (twelve) hours. Controller    DOXYCYCLINE (VIBRAMYCIN) 100 MG CAP    Take 1 capsule (100 mg total) by mouth 2 (two) times daily.    GUAIFENESIN-CODEINE 100-10 MG/5 ML (TUSSI-ORGANIDIN NR)  MG/5 ML SYRUP    Take 10 mLs by  mouth 3 (three) times daily as needed for Cough.    LASMIDITAN (REYVOW) TABLET 100 MG    Take 1 tablet (100 mg) by mouth once daily as needed (migraine).    LEVALBUTEROL (XOPENEX HFA) 45 MCG/ACTUATION INHALER    Inhale 1-2 puffs into the lungs every 4 (four) hours as needed for Wheezing. Rescue    ZAVEGEPANT (ZAVZPRET) 10 MG/ACTUATION SPRY    1 spray by Nasal route daily as needed (migraine).

## 2025-01-17 ENCOUNTER — PATIENT MESSAGE (OUTPATIENT)
Dept: NEUROLOGY | Facility: CLINIC | Age: 60
End: 2025-01-17
Payer: COMMERCIAL

## 2025-01-27 ENCOUNTER — PATIENT MESSAGE (OUTPATIENT)
Dept: GASTROENTEROLOGY | Facility: CLINIC | Age: 60
End: 2025-01-27
Payer: COMMERCIAL

## 2025-01-29 ENCOUNTER — OFFICE VISIT (OUTPATIENT)
Dept: GASTROENTEROLOGY | Facility: CLINIC | Age: 60
End: 2025-01-29
Payer: COMMERCIAL

## 2025-01-29 ENCOUNTER — PATIENT MESSAGE (OUTPATIENT)
Dept: FAMILY MEDICINE | Facility: CLINIC | Age: 60
End: 2025-01-29
Payer: COMMERCIAL

## 2025-01-29 VITALS — HEIGHT: 64 IN | WEIGHT: 150.13 LBS | BODY MASS INDEX: 25.63 KG/M2

## 2025-01-29 DIAGNOSIS — K57.92 DIVERTICULITIS: Primary | ICD-10-CM

## 2025-01-29 PROCEDURE — 99214 OFFICE O/P EST MOD 30 MIN: CPT | Mod: S$GLB,,, | Performed by: STUDENT IN AN ORGANIZED HEALTH CARE EDUCATION/TRAINING PROGRAM

## 2025-01-29 PROCEDURE — 3008F BODY MASS INDEX DOCD: CPT | Mod: CPTII,S$GLB,, | Performed by: STUDENT IN AN ORGANIZED HEALTH CARE EDUCATION/TRAINING PROGRAM

## 2025-01-29 PROCEDURE — 99999 PR PBB SHADOW E&M-EST. PATIENT-LVL IV: CPT | Mod: PBBFAC,,, | Performed by: STUDENT IN AN ORGANIZED HEALTH CARE EDUCATION/TRAINING PROGRAM

## 2025-01-29 RX ORDER — CIPROFLOXACIN 500 MG/1
500 TABLET ORAL EVERY 12 HOURS
Qty: 20 TABLET | Refills: 0 | Status: SHIPPED | OUTPATIENT
Start: 2025-01-29 | End: 2025-02-08

## 2025-01-29 RX ORDER — METRONIDAZOLE 500 MG/1
500 TABLET ORAL EVERY 8 HOURS
Qty: 30 TABLET | Refills: 0 | Status: SHIPPED | OUTPATIENT
Start: 2025-01-29 | End: 2025-02-08

## 2025-01-29 NOTE — PROGRESS NOTES
"Subjective:       Patient ID: Mayra Smart is a 59 y.o. female Body mass index is 25.77 kg/m².    Chief Complaint: Follow-up    This patient is new to me.     HPI:  Presents today for further evaluation of LLQ abdominal pain. Reports symptoms started last week. Describes the pain as sharp. Reports she has had similar pain like this multiple times before in the past. She suspected this was a diverticulitis flare so she took left over cipro/flagyl at home for 5 days.    Symptoms did start to get better but seemed to flare again this past  night, though not as severe. She took one dose of ciprofloxacin the following day. She reports symptoms are starting to improve but still has some tenderness in the LLQ. Reports her last episode of diverticulitis was around 3 years ago. During this acute flare, she has not had any bloody or black stools.              Review of Systems   Gastrointestinal:  Negative for anal bleeding, blood in stool and rectal pain. Abdominal pain: LLQ.      Patient's last menstrual period was 10/22/2008.  Past Medical History:   Diagnosis Date    Back pain     Bell's palsy     during pregnancy    Breast disorder     fibercystic tissue    Fibroids     uterine    GERD (gastroesophageal reflux disease)     Headache     History of anesthesia reaction     sensitive to anesthesia, " Major hangover"    Neck pain     Pelvic pain in female     left lower quadrant    Preeclampsia     Thyroid disease      Past Surgical History:   Procedure Laterality Date     SECTION      times 2    CLOSURE, PFO  2024    Procedure: Closure, PFO;  Surgeon: Yoel Mccracken MD;  Location: [facility] CATH;  Service: Cardiology;;    COLONOSCOPY      x2    COLONOSCOPY N/A 2023    Procedure: COLONOSCOPY;  Surgeon: Lloyd Fleming MD;  Location: [facility] ENDO;  Service: Gastroenterology;  Laterality: N/A;    DILATION AND CURETTAGE OF UTERUS  2007    ENDOMETRIAL ABLATION  10/22/2008    BTL    " HYSTERECTOMY      INTRACARDIAC ECHOCARDIOGRAPHY  7/31/2024    Procedure: Intracardiac echocardiogram;  Surgeon: Yoel Mccracken MD;  Location: [facility];  Service: Cardiology;;    OOPHORECTOMY      REDUCTION OF BOTH BREASTS       Family History   Problem Relation Name Age of Onset    Pulmonary embolism Mother      Skin cancer Mother      Skin cancer Father      Thyroid disease Sister      Heart disease Brother      Breast cancer Neg Hx      Ovarian cancer Neg Hx       Social History     Tobacco Use    Smoking status: Never     Passive exposure: Never    Smokeless tobacco: Never   Substance Use Topics    Alcohol use: Yes     Comment: ocassionally    Drug use: Never     Wt Readings from Last 10 Encounters:   01/31/25 68.1 kg (150 lb 2.1 oz)   01/29/25 68.1 kg (150 lb 2.1 oz)   01/16/25 68.6 kg (151 lb 3.8 oz)   01/14/25 69.6 kg (153 lb 5.3 oz)   01/06/25 66.4 kg (146 lb 6.2 oz)   01/06/25 66.4 kg (146 lb 7.9 oz)   12/17/24 67.1 kg (148 lb)   11/20/24 66.6 kg (146 lb 13.2 oz)   11/08/24 66.6 kg (146 lb 13.2 oz)   10/10/24 66.6 kg (146 lb 13.2 oz)     Lab Results   Component Value Date    WBC 7.78 08/15/2024    HGB 14.5 08/15/2024    HCT 42.8 08/15/2024    MCV 89 08/15/2024     (H) 08/15/2024     CMP  Sodium   Date Value Ref Range Status   08/15/2024 139 136 - 145 mmol/L Final     Potassium   Date Value Ref Range Status   08/15/2024 3.9 3.5 - 5.1 mmol/L Final     Comment:     Anion Gap reference range revised on 4/28/2023     Chloride   Date Value Ref Range Status   08/15/2024 106 95 - 110 mmol/L Final     CO2   Date Value Ref Range Status   08/15/2024 24 22 - 31 mmol/L Final     Glucose   Date Value Ref Range Status   08/15/2024 111 (H) 70 - 110 mg/dL Final     Comment:     The ADA recommends the following guidelines for fasting glucose:    Normal:       less than 100 mg/dL    Prediabetes:  100 mg/dL to 125 mg/dL    Diabetes:     126 mg/dL or higher       BUN   Date Value Ref Range Status   08/15/2024 16 7 - 18  "mg/dL Final     Creatinine   Date Value Ref Range Status   08/15/2024 0.76 0.50 - 1.40 mg/dL Final     Calcium   Date Value Ref Range Status   08/15/2024 9.2 8.4 - 10.2 mg/dL Final     Total Protein   Date Value Ref Range Status   08/15/2024 7.3 6.0 - 8.4 g/dL Final     Albumin   Date Value Ref Range Status   08/15/2024 4.4 3.5 - 5.2 g/dL Final     Total Bilirubin   Date Value Ref Range Status   08/15/2024 0.4 0.2 - 1.3 mg/dL Final     Alkaline Phosphatase   Date Value Ref Range Status   08/15/2024 81 38 - 145 U/L Final     AST   Date Value Ref Range Status   08/15/2024 26 14 - 36 U/L Final     ALT   Date Value Ref Range Status   08/15/2024 31 0 - 35 U/L Final     Anion Gap   Date Value Ref Range Status   08/15/2024 9 5 - 12 mmol/L Final     Comment:     Anion Gap reference range revised on 4/28/2023     eGFR if    Date Value Ref Range Status   02/22/2022 >60.0 >60 mL/min/1.73 m^2 Final     eGFR if non    Date Value Ref Range Status   02/22/2022 >60.0 >60 mL/min/1.73 m^2 Final     Comment:     Calculation used to obtain the estimated glomerular filtration  rate (eGFR) is the CKD-EPI equation.        No results found for: "AMYLASE"  No results found for: "LIPASE"  No results found for: "LIPASERES"  Lab Results   Component Value Date    TSH 0.297 (L) 01/16/2025       Reviewed prior medical records including endoscopy history (see below).      11/1/23 Colonoscopy:  Findings:       The colon (entire examined portion) appeared normal to cecum, except        few scattered diverticulae, including terminal ileum. Random colon        biopsies were taken with a cold forceps for histology.     Objective:      Physical Exam  Abdominal:      General: There is no distension.      Palpations: Abdomen is soft.      Tenderness: Tenderness: LLQ. There is no guarding or rebound.         Assessment:       1. Diverticulitis        Plan:       Diverticulitis  -     ciprofloxacin HCl (CIPRO) 500 MG tablet; " Take 1 tablet (500 mg total) by mouth every 12 (twelve) hours. for 10 days  Dispense: 20 tablet; Refill: 0  -     metroNIDAZOLE (FLAGYL) 500 MG tablet; Take 1 tablet (500 mg total) by mouth every 8 (eight) hours. for 10 days  Dispense: 30 tablet; Refill: 0        -Will send course of Ciprofloxacin and Metronidazole for her to complete.  -Keep low fiber diet for the next 10 days. Then slowly start to incorporate more fiber into diet and add psyllium husk.  -Discussed repeating colonoscopy. Given her normal colonoscopy just over a year ago, we agreed we can hold off on repeat at this time. Discussed maybe repeating colonoscopy at 5 year oz rather than 10.  -Notify office if no improvement in pain or start to develop fevers, chills, or bloody bowel movements.  -Follow up 6 months      Dagoberto Zheng,

## 2025-01-30 ENCOUNTER — PATIENT MESSAGE (OUTPATIENT)
Dept: NEUROLOGY | Facility: CLINIC | Age: 60
End: 2025-01-30
Payer: COMMERCIAL

## 2025-01-30 ENCOUNTER — PATIENT MESSAGE (OUTPATIENT)
Dept: FAMILY MEDICINE | Facility: CLINIC | Age: 60
End: 2025-01-30
Payer: COMMERCIAL

## 2025-01-30 DIAGNOSIS — Z23 NEED FOR VACCINATION: Primary | ICD-10-CM

## 2025-01-31 ENCOUNTER — PROCEDURE VISIT (OUTPATIENT)
Dept: NEUROLOGY | Facility: CLINIC | Age: 60
End: 2025-01-31
Payer: COMMERCIAL

## 2025-01-31 ENCOUNTER — CLINICAL SUPPORT (OUTPATIENT)
Dept: FAMILY MEDICINE | Facility: CLINIC | Age: 60
End: 2025-01-31
Payer: COMMERCIAL

## 2025-01-31 VITALS
SYSTOLIC BLOOD PRESSURE: 109 MMHG | HEIGHT: 64 IN | DIASTOLIC BLOOD PRESSURE: 75 MMHG | TEMPERATURE: 97 F | RESPIRATION RATE: 17 BRPM | HEART RATE: 80 BPM | BODY MASS INDEX: 25.63 KG/M2 | WEIGHT: 150.13 LBS

## 2025-01-31 VITALS — RESPIRATION RATE: 17 BRPM

## 2025-01-31 DIAGNOSIS — Z23 NEED FOR VACCINATION: Primary | ICD-10-CM

## 2025-01-31 DIAGNOSIS — G43.719 INTRACTABLE CHRONIC MIGRAINE WITHOUT AURA AND WITHOUT STATUS MIGRAINOSUS: Primary | ICD-10-CM

## 2025-01-31 PROCEDURE — 99211 OFF/OP EST MAY X REQ PHY/QHP: CPT | Mod: 25,S$GLB,, | Performed by: FAMILY MEDICINE

## 2025-01-31 PROCEDURE — 90677 PCV20 VACCINE IM: CPT | Mod: S$GLB,,, | Performed by: FAMILY MEDICINE

## 2025-01-31 PROCEDURE — 99999 PR PBB SHADOW E&M-EST. PATIENT-LVL II: CPT | Mod: PBBFAC,,,

## 2025-01-31 PROCEDURE — 90471 IMMUNIZATION ADMIN: CPT | Mod: S$GLB,,, | Performed by: FAMILY MEDICINE

## 2025-01-31 PROCEDURE — 64615 CHEMODENERV MUSC MIGRAINE: CPT | Mod: S$GLB,,, | Performed by: PSYCHIATRY & NEUROLOGY

## 2025-01-31 NOTE — PROGRESS NOTES
After obtaining consent, and per orders of Dr. Stephan Gibson, injection of Prevnar-20 (PF) given by Amarjit Martinez. Patient instructed to remain in clinic for 20 minutes afterwards, and to report any adverse reaction to me immediately.  PtFely Bee.

## 2025-01-31 NOTE — PROCEDURES
ProceduresBOTOX PROCEDURE    PROCEDURE PERFORMED: Botulinum toxin injection (90975)    CLINICAL INDICATION: G43.719    A time out was conducted just before the start of the procedure to verify the correct patient and procedure, procedure location, and all relevant critical information.   The patient meets criteria for chronic headaches according to the ICHD-II, the patient has more than 15 headaches a month out of at least 8 are migraines which last for more than 4 hours a day.    The Botox injections have achieved well over 50%  improvement in the patient's symptoms. Migraines have been reduced at least 7 days per month and the number of cumulative hours suffering with headaches has been reduced at least 100 total hours per month. Today she does have a headache indicating that the Botox has worn off. Frequency of treatment is every 3 months unless no response to the treatments, at which time we will discontinue the injections.      DESCRIPTION OF PROCEDURE: After obtaining informed consent and under aseptic technique, a total of 175 units of botulinum toxin type A were injected in the following muscles: Procerus 5 units,  5 units bilaterally, frontalis 20 units, temporalis 20 units bilaterally, occipitalis 15 units bilaterally, upper cervical paraspinals 10 units bilaterally, masseters 10 units bilaterally and trapezius 15 units bilaterally. The patient was given a total of 175 units in 31 sites.The patient tolerated the procedure well. There were no complications. The patient was given a prescription for repeat treatment in 3 months.     Unavoidable waste 25 units      Hemanth Kowalski M.D  Medical Director, Headache and Facial Pain  [facility]

## 2025-02-06 ENCOUNTER — PATIENT MESSAGE (OUTPATIENT)
Dept: GASTROENTEROLOGY | Facility: CLINIC | Age: 60
End: 2025-02-06
Payer: COMMERCIAL

## 2025-02-07 NOTE — TELEPHONE ENCOUNTER
At this point if the pain has not resolved and is recurrent, I would recommend she go to the ER for further evaluation and to prove if this is actually diverticulitis.

## 2025-04-15 ENCOUNTER — HOSPITAL ENCOUNTER (OUTPATIENT)
Dept: RADIOLOGY | Facility: HOSPITAL | Age: 60
Discharge: HOME | End: 2025-04-15
Attending: ANESTHESIOLOGY
Payer: COMMERCIAL

## 2025-04-15 ENCOUNTER — TELEPHONE (OUTPATIENT)
Dept: PAIN MEDICINE | Facility: CLINIC | Age: 60
End: 2025-04-15

## 2025-04-15 ENCOUNTER — OFFICE VISIT (OUTPATIENT)
Dept: PAIN MEDICINE | Facility: CLINIC | Age: 60
End: 2025-04-15
Payer: COMMERCIAL

## 2025-04-15 VITALS
SYSTOLIC BLOOD PRESSURE: 141 MMHG | BODY MASS INDEX: 25.65 KG/M2 | HEART RATE: 79 BPM | HEIGHT: 64 IN | DIASTOLIC BLOOD PRESSURE: 90 MMHG | WEIGHT: 150.25 LBS

## 2025-04-15 DIAGNOSIS — M54.9 DORSALGIA, UNSPECIFIED: ICD-10-CM

## 2025-04-15 DIAGNOSIS — M54.16 LUMBAR RADICULITIS: Primary | ICD-10-CM

## 2025-04-15 PROCEDURE — 72114 X-RAY EXAM L-S SPINE BENDING: CPT | Mod: 26,,, | Performed by: RADIOLOGY

## 2025-04-15 PROCEDURE — 3080F DIAST BP >= 90 MM HG: CPT | Mod: CPTII,S$GLB,, | Performed by: ANESTHESIOLOGY

## 2025-04-15 PROCEDURE — 96372 THER/PROPH/DIAG INJ SC/IM: CPT | Mod: S$GLB,,, | Performed by: ANESTHESIOLOGY

## 2025-04-15 PROCEDURE — 99204 OFFICE O/P NEW MOD 45 MIN: CPT | Mod: 25,S$GLB,, | Performed by: ANESTHESIOLOGY

## 2025-04-15 PROCEDURE — 1159F MED LIST DOCD IN RCRD: CPT | Mod: CPTII,S$GLB,, | Performed by: ANESTHESIOLOGY

## 2025-04-15 PROCEDURE — 3008F BODY MASS INDEX DOCD: CPT | Mod: CPTII,S$GLB,, | Performed by: ANESTHESIOLOGY

## 2025-04-15 PROCEDURE — 99999 PR PBB SHADOW E&M-EST. PATIENT-LVL V: CPT | Mod: PBBFAC,,, | Performed by: ANESTHESIOLOGY

## 2025-04-15 PROCEDURE — 3077F SYST BP >= 140 MM HG: CPT | Mod: CPTII,S$GLB,, | Performed by: ANESTHESIOLOGY

## 2025-04-15 PROCEDURE — 72148 MRI LUMBAR SPINE W/O DYE: CPT | Mod: TC,PO

## 2025-04-15 PROCEDURE — 72114 X-RAY EXAM L-S SPINE BENDING: CPT | Mod: TC,PO

## 2025-04-15 PROCEDURE — 72148 MRI LUMBAR SPINE W/O DYE: CPT | Mod: 26,,, | Performed by: RADIOLOGY

## 2025-04-15 PROCEDURE — 1160F RVW MEDS BY RX/DR IN RCRD: CPT | Mod: CPTII,S$GLB,, | Performed by: ANESTHESIOLOGY

## 2025-04-15 RX ORDER — METHYLPREDNISOLONE ACETATE 40 MG/ML
40 INJECTION, SUSPENSION INTRA-ARTICULAR; INTRALESIONAL; INTRAMUSCULAR; SOFT TISSUE
Status: COMPLETED | OUTPATIENT
Start: 2025-04-15 | End: 2025-04-15

## 2025-04-15 RX ORDER — KETOROLAC TROMETHAMINE 15 MG/ML
15 INJECTION, SOLUTION INTRAMUSCULAR; INTRAVENOUS
Status: COMPLETED | OUTPATIENT
Start: 2025-04-15 | End: 2025-04-15

## 2025-04-15 RX ORDER — METHOCARBAMOL 500 MG/1
500 TABLET, FILM COATED ORAL 2 TIMES DAILY PRN
Qty: 30 TABLET | Refills: 0 | Status: SHIPPED | OUTPATIENT
Start: 2025-04-15

## 2025-04-15 RX ADMIN — METHYLPREDNISOLONE ACETATE 40 MG: 40 INJECTION, SUSPENSION INTRA-ARTICULAR; INTRALESIONAL; INTRAMUSCULAR; SOFT TISSUE at 10:04

## 2025-04-15 RX ADMIN — KETOROLAC TROMETHAMINE 15 MG: 15 INJECTION, SOLUTION INTRAMUSCULAR; INTRAVENOUS at 10:04

## 2025-04-15 NOTE — PROGRESS NOTES
[facility] New Patient Evaluation      Referred by: Olga Moreno    PCP:     CC:   Chief Complaint   Patient presents with    Follow-up    Low-back Pain    Leg Pain    Rectal Pain          4/15/2025     9:23 AM 4/8/2024     3:47 PM   Last 3 PDI Scores   Pain Disability Index (PDI) 39 22         HPI:   Mayra Smart is a 59 y.o. female patient who has a past medical history of Back pain, Bell's palsy, Breast disorder, Fibroids, GERD (gastroesophageal reflux disease), Headache, History of anesthesia reaction, Neck pain, Pelvic pain in female, Preeclampsia, and Thyroid disease. She presents with acute back pain that started approximately one week ago after lifting and lowering an aquarium.  She has a history of chronic back pain.  This is an acute on chronic problem today.  Pain radiates down both legs, with the left side worse than the right, extending completely down the back of her left leg and partially down her right leg. Initially, the pain was significantly more severe. Currently, she is experiencing significant discomfort, only able to lie on her back and use ice for relief. Heat application has been ineffective. The pain is causing nausea and stomach discomfort.  She has difficulty finding a comfortable position. She sleeps on the floor as the bed is too soft and exacerbates the pain.  For self-management, she has attempted a stretching exercise where she lies on her left side and moves her right leg back and up, which provides some relief. Ice application has also been helpful, while heat has not been effective.  Patient has tried various treatments for her condition. Ice application provides some relief, while heat application has been ineffective. She sleeps on the floor as the bed exacerbates her condition. A stretching exercise, which involves lying on her left side and moving her right leg back and up, provides some relief.      Pain Intervention History:      Past Spine  "Surgical History:      Past and current medications:  Antineuropathics:  NSAIDs:  Physical therapy: no, currently too painful   Antidepressants:  Muscle relaxers: robaxin  Opioids:  Antiplatelets/Anticoagulants: aspirin, plavix, xarelto     History:  Current Medications[1]    Past Medical History:   Diagnosis Date    Back pain     Bell's palsy     during pregnancy    Breast disorder     fibercystic tissue    Fibroids     uterine    GERD (gastroesophageal reflux disease)     Headache     History of anesthesia reaction     sensitive to anesthesia, " Major hangover"    Neck pain     Pelvic pain in female     left lower quadrant    Preeclampsia     Thyroid disease        Past Surgical History:   Procedure Laterality Date     SECTION      times 2    CLOSURE, PFO  2024    Procedure: Closure, PFO;  Surgeon: Yoel Mccracken MD;  Location: [facility] CATH;  Service: Cardiology;;    COLONOSCOPY      x2    COLONOSCOPY N/A 2023    Procedure: COLONOSCOPY;  Surgeon: Lloyd Fleming MD;  Location: [facility] ENDO;  Service: Gastroenterology;  Laterality: N/A;    DILATION AND CURETTAGE OF UTERUS  2007    ENDOMETRIAL ABLATION  10/22/2008    BTL    HYSTERECTOMY      INTRACARDIAC ECHOCARDIOGRAPHY  2024    Procedure: Intracardiac echocardiogram;  Surgeon: Yoel Mccracken MD;  Location: [facility] CATH;  Service: Cardiology;;    OOPHORECTOMY      REDUCTION OF BOTH BREASTS         Family History   Problem Relation Name Age of Onset    Pulmonary embolism Mother      Skin cancer Mother      Skin cancer Father      Thyroid disease Sister      Heart disease Brother      Breast cancer Neg Hx      Ovarian cancer Neg Hx         Social History     Socioeconomic History    Marital status:    Tobacco Use    Smoking status: Never     Passive exposure: Never    Smokeless tobacco: Never   Substance and Sexual Activity    Alcohol use: Yes     Comment: ocassionally    Drug use: Never    Sexual activity: Yes     Partners: Male     " "Birth control/protection: Post-menopausal, See Surgical Hx     Social Drivers of Health     Financial Resource Strain: Low Risk  (2/23/2024)    Overall Financial Resource Strain (CARDIA)     Difficulty of Paying Living Expenses: Not very hard   Food Insecurity: No Food Insecurity (2/23/2024)    Hunger Vital Sign     Worried About Running Out of Food in the Last Year: Never true     Ran Out of Food in the Last Year: Never true   Transportation Needs: No Transportation Needs (2/23/2024)    PRAPARE - Transportation     Lack of Transportation (Medical): No     Lack of Transportation (Non-Medical): No   Physical Activity: Insufficiently Active (2/23/2024)    Exercise Vital Sign     Days of Exercise per Week: 3 days     Minutes of Exercise per Session: 30 min   Stress: No Stress Concern Present (2/23/2024)    [employer] - Occupational Stress Questionnaire     Feeling of Stress : Not at all   Housing Stability: Low Risk  (2/23/2024)    Housing Stability Vital Sign     Unable to Pay for Housing in the Last Year: No     Number of Places Lived in the Last Year: 1     Unstable Housing in the Last Year: No       Review of patient's allergies indicates:   Allergen Reactions    Cephalosporins Anaphylaxis    Penicillins Anaphylaxis    Codeine Itching and Swelling    Opioids - morphine analogues      Vertigo, nausea, itchy throat, tongue enlgmt, rash    Vicoprofen [hydrocodone-ibuprofen] Other (See Comments)     Severe nausea       Review of Systems:  12 point review of systems is negative.    Physical Exam:  Vitals:    04/15/25 0924   BP: (!) 141/90   Pulse: 79   Weight: 68.2 kg (150 lb 3.9 oz)   Height: 5' 4" (1.626 m)   PainSc:   7   PainLoc: Back     Body mass index is 25.79 kg/m².    Gen: NAD  Psych: mood appropriate for given condition  HEENT: eyes anicteric   CV: RRR  HEENT: anicteric   Respiratory: non-labored, no signs of respiratory distress  Abd: non-distended  Skin: warm, dry and " intact.  Gait: antalgic gait.     Sensory:  Intact and symmetrical to light touch in L1-S1 dermatomes bilaterally.    Motor:     Right Left   L2/3 Iliacus Hip flexion  5  5   L3/4 Qudratus Femoris Knee Extension  5  5   L4/5 Tib Anterior Ankle Dorsiflexion   5  5   L5/S1 Extensor Hallicus Longus Great toe extension  5  5   S1/S2 Gastroc/Soleus Plantar Flexion  5  5      Right Left                  Patellar DTR 2+ 2+   Achilles DTR 2+ 2+                      Physical Exam    General: No acute distress. Well-developed. Well-nourished.  Eyes: EOMI. Sclerae anicteric.  HENT: Normocephalic. Atraumatic. Nares patent. Moist oral mucosa.  Ears: Bilateral TMs clear. Bilateral EACs clear.  Cardiovascular: Regular rate. Regular rhythm. No murmurs. No rubs. No gallops. Normal S1, S2.  Respiratory: Normal respiratory effort. Clear to auscultation bilaterally. No rales. No rhonchi. No wheezing.  Abdomen: Soft. Non-tender. Non-distended. Normoactive bowel sounds.  Musculoskeletal: No  obvious deformity. Normal strength in both legs.  Extremities: No lower extremity edema.  Neurological: Alert & oriented x3. No slurred speech. Normal gait. Normal sensation in legs.  Psychiatric: Normal mood. Normal affect. Good insight. Good judgment.  Skin: Warm. Dry. No rash.          Labs:  Lab Results   Component Value Date    HGBA1C 5.2 05/22/2024       Lab Results   Component Value Date    WBC 7.78 08/15/2024    HGB 14.5 08/15/2024    HCT 42.8 08/15/2024    MCV 89 08/15/2024     (H) 08/15/2024       Imaging:  MRI lumbar spine 10/12/16  Findings:      The lumbar  vertebral body heights are preserved. The static anterior-posterior cervical vertebral body alignment is within normal limits.  No suspicious nodular bone marrow edema is visualized.  Small hemangioma in the L2 vertebral body is noted.  Varying degrees of disc desiccation are seen throughout the lumbar spine with relative sparing of the L5-S1 disc signal.  The conus medullaris  terminate at approximately superior L2 level.     L1-L2 demonstrates minimal broad-based disc bulge and mild facet arthrosis without significant central spinal canal or neural foraminal stenosis.  L2-L3 demonstrates minimal broad-based disc bulge and mild bilateral facet arthrosis without significant central spinal canal or neural foraminal stenosis.  L3-L4 demonstrates mild broad-based disc bulge slightly asymmetric to the left and mild bilateral facet arthrosis.  No significant central spinal canal stenosis is seen.  Minimal left lateral recess narrowing is seen.  Minimal anterior-inferior left neural foraminal narrowing is seen.  L4-L5 demonstrates mild/moderate broad-based disc bulge with superimposed central annular fissure and broad-based central disc protrusion.  Ligamentum flavum hypertrophy and mild to moderate bilateral facet arthrosis is seen.  Mild central spinal canal narrowing is seen.  Minimal bilateral lateral recess narrowing is seen.  Mild left and minimal right neural foraminal narrowing is seen.  L5-S1 demonstrates mild broad-based disc bulge with possible small superimposed central broad-based disc protrusion and mild to moderate bilateral facet arthrosis.  No significant overall central spinal canal stenosis is seen.  Minimal anterior-inferior to right neural foraminal narrowing is seen.      Diagnosis:  1. Lumbar radiculitis  -     methocarbamoL (ROBAXIN) 500 MG Tab; Take 1 tablet (500 mg total) by mouth 2 (two) times daily as needed (muscle spasms).  Dispense: 30 tablet; Refill: 0  -     methylPREDNISolone acetate injection 40 mg  -     ketorolac injection 15 mg    2. Dorsalgia, unspecified  -     X-Ray Lumbar Complete Including Flex And Ext; Future; Expected date: 04/15/2025  -     MRI Lumbar Spine Without Contrast; Future; Expected date: 04/15/2025          Mayra Tabor Berry is a 59 y.o. female patient who has a past medical history of Back pain, Bell's palsy, Breast disorder,  Fibroids, GERD (gastroesophageal reflux disease), Headache, History of anesthesia reaction, Neck pain, Pelvic pain in female, Preeclampsia, and Thyroid disease. She presents with acute on chronic back pain.     Assessment & Plan    M54.16 Lumbar radiculitis  M54.9 Dorsalgia, unspecified    LUMBAR RADICULITIS:  - Administered intramuscular steroid injection and anti-inflammatory injection.  - Discussed potential future steroid injection in the back if symptoms do not improve.  - Ordered MRI Back.  - pain currently too severe to participate in formal physical therapy    DORSALGIA, UNSPECIFIED:  - Administered intramuscular steroid 40mg injection and toradol 30mg IM injection for inflammatory pain  - Discussed potential future steroid injection in the back if symptoms do not improve.  - Continue ice for pain.  - Avoid heat therapy.  - Take Robaxin as needed in the evening due to potential drowsiness.  - Ordered XR Back.  - Ordered MRI Back.    LIFESTYLE CHANGES:  - Perform stretching exercise: lay on left side, move right leg back and up, hold position.  - Contact office in 2 weeks to report on condition.  - Follow up sooner if condition worsens.  - Use [employer] for communication.          : Not applicable    Ke Garza M.D.  Interventional Pain Medicine / Anesthesiology    This note was completed with dictation software and grammatical errors may exist.  This note was generated with the assistance of ambient listening technology. Verbal consent was obtained by the patient and accompanying visitor(s) for the recording of patient appointment to facilitate this note. I attest to having reviewed and edited the generated note for accuracy, though some syntax or spelling errors may persist. Please contact the author of this note for any clarification.         [1]   Current Outpatient Medications:     aspirin (ECOTRIN) 81 MG EC tablet, Take 81 mg by mouth once daily., Disp: , Rfl:     atenoloL (TENORMIN) 25 MG tablet,  Take 2 tablets (50 mg total) by mouth every evening. As instructed, Disp: 180 tablet, Rfl: 3    atogepant (QULIPTA) 30 mg Tab, Take 1 tablet (30 mg total) by mouth once daily., Disp: 30 tablet, Rfl: 3    atorvastatin (LIPITOR) 10 MG tablet, TAKE 1 TABLET(10 MG) BY MOUTH EVERY DAY, Disp: 30 tablet, Rfl: 5    clopidogreL (PLAVIX) 75 mg tablet, Take 75 mg by mouth once daily., Disp: , Rfl:     diazePAM (VALIUM) 5 MG tablet, Take 1 tablet (5 mg total) by mouth every 8 (eight) hours as needed for Anxiety., Disp: 60 tablet, Rfl: 1    EPINEPHrine (EPIPEN) 0.3 mg/0.3 mL AtIn, Inject 0.3 mLs (0.3 mg total) into the muscle once as needed., Disp: 2 Device, Rfl: 3    estradiol 0.05 mg/24 hr td ptsw (VIVELLE-DOT) 0.05 mg/24 hr, Place 1 patch onto the skin twice a week., Disp: 8 patch, Rfl: 11    finasteride (PROPECIA) 1 mg tablet, TAKE 1 TABLET(1 MG) BY MOUTH DAILY, Disp: 30 tablet, Rfl: 2    flecainide (TAMBOCOR) 150 MG Tab, Take 1 tablet (150 mg total) by mouth daily as needed (afib)., Disp: 10 tablet, Rfl: 12    fluticasone-umeclidin-vilanter (TRELEGY ELLIPTA) 200-62.5-25 mcg inhaler, Inhale 1 puff into the lungs once daily., Disp: 60 each, Rfl: 11    ketoconazole (NIZORAL) 2 % shampoo, Lather into affected areas. Rinse off in 5-10 min. Repeat 2-3 times a week., Disp: 120 mL, Rfl: 3    levalbuterol (XOPENEX HFA) 45 mcg/actuation inhaler, Inhale 1-2 puffs into the lungs every 4 (four) hours as needed for Wheezing. This is a rescue inhaler medication and should be used as needed, Disp: 15 g, Rfl: 11    methocarbamoL (ROBAXIN) 500 MG Tab, Take 1 tablet (500 mg total) by mouth 2 (two) times daily as needed (muscle spasms)., Disp: 30 tablet, Rfl: 0    minoxidiL (LONITEN) 2.5 MG tablet, TAKE 1/2 TABLET(1.25 MG) BY MOUTH DAILY, Disp: 45 tablet, Rfl: 1    progesterone (PROMETRIUM) 100 MG capsule, Take 1 capsule (100 mg total) by mouth every evening., Disp: 30 capsule, Rfl: 12    rivaroxaban (XARELTO) 20 mg Tab, Take 1 tablet (20 mg  total) by mouth every evening., Disp: 30 tablet, Rfl: 12    thyroid, pork, (NP THYROID) 60 mg Tab, TAKE 1 TABLET BY MOUTH EVERY DAY, Disp: 90 tablet, Rfl: 3    triamcinolone acetonide 0.1% (KENALOG) 0.1 % cream, Apply topically 2 (two) times daily., Disp: 45 g, Rfl: 1    ubrogepant (UBRELVY) 100 mg tablet, Start Ubrelvy 100 mg for headache attacks. May repeat once in 1 hour to a max of 2 per day., Disp: 16 tablet, Rfl: 11    Current Facility-Administered Medications:     ketorolac injection 15 mg, 15 mg, Intramuscular, 1 time in Clinic/HOD,     methylPREDNISolone acetate injection 40 mg, 40 mg, Intramuscular, 1 time in Clinic/HOD,     onabotulinumtoxina injection 200 Units, 200 Units, Intramuscular, Q90 Days, , 200 Units at 05/23/24 0922    onabotulinumtoxina injection 200 Units, 200 Units, Intramuscular, Q90 Days, , 200 Units at 08/16/24 0821    onabotulinumtoxina injection 200 Units, 200 Units, Intramuscular, Q90 Days, , 200 Units at 11/08/24 0939    onabotulinumtoxina injection 200 Units, 200 Units, Intramuscular, Q90 Days, , 200 Units at 01/31/25 0924

## 2025-04-17 ENCOUNTER — TELEPHONE (OUTPATIENT)
Dept: PAIN MEDICINE | Facility: CLINIC | Age: 60
End: 2025-04-17
Payer: COMMERCIAL

## 2025-04-17 ENCOUNTER — RESULTS FOLLOW-UP (OUTPATIENT)
Dept: PAIN MEDICINE | Facility: CLINIC | Age: 60
End: 2025-04-17

## 2025-04-17 DIAGNOSIS — M54.16 LUMBAR RADICULOPATHY: ICD-10-CM

## 2025-04-17 DIAGNOSIS — M54.16 LUMBAR RADICULITIS: Primary | ICD-10-CM

## 2025-04-17 RX ORDER — SODIUM CHLORIDE, SODIUM LACTATE, POTASSIUM CHLORIDE, CALCIUM CHLORIDE 600; 310; 30; 20 MG/100ML; MG/100ML; MG/100ML; MG/100ML
INJECTION, SOLUTION INTRAVENOUS CONTINUOUS
OUTPATIENT
Start: 2025-04-17

## 2025-04-17 NOTE — TELEPHONE ENCOUNTER
I spoke to the patient and reviewed her MRI.  She continues to have significant pain that is interrupting her sleep.  We can schedule for lumbar CARMEN.    Physician - Dr Garza    Type of Procedure/Injection - Lumbar Epidural  L4/5           Laterality - NA      Priority - Normal      Anxiolysis- RNIV      Need to hold medication - Yes      Aspirin for 4 days, Plavix for 5 days, and Xarelto for 3 days      Clearance needed - Yes      Follow up - 3 week

## 2025-04-17 NOTE — TELEPHONE ENCOUNTER
Spoke with patient and scheduled. Per provider patient to hold ASA x 4 , Plavix x 5 and Xarelto x 3 days prior. Patient reports that Xarelto is only taken when she goes into Afib.Message to Dr. Mccracken for clearance.Pre op information given to patient and follow up appointment scheduled.

## 2025-04-17 NOTE — TELEPHONE ENCOUNTER
Hi! Patient is scheduled for a lumbar CARMEN on 5/2 with Dr. Garza. Provider requests patient hold ASA x 4 and Plavix x 5 days prior to injection. Is patient cleared to hold ASA and Plavix for scheduled epidural steroid injection? Thanks

## 2025-04-21 ENCOUNTER — PATIENT MESSAGE (OUTPATIENT)
Dept: PAIN MEDICINE | Facility: CLINIC | Age: 60
End: 2025-04-21
Payer: COMMERCIAL

## 2025-04-22 ENCOUNTER — TELEPHONE (OUTPATIENT)
Dept: PAIN MEDICINE | Facility: CLINIC | Age: 60
End: 2025-04-22
Payer: COMMERCIAL

## 2025-04-22 NOTE — TELEPHONE ENCOUNTER
----- Message from Valerie sent at 4/22/2025 11:38 AM CDT -----  Regarding: Reschedule Appt  Type: Reschedule Appointment RequestName of Caller: pt Would the patient rather a call back or a response via [facility]? [email or web address] Call Back Number: [phone] Information: pt would like to cancel   Please call back to advise. Thanks!

## 2025-04-22 NOTE — TELEPHONE ENCOUNTER
----- Message from Yesenia sent at 4/22/2025 12:03 PM CDT -----  Contact: Self  Type:  Patient Returning CallWho Called:  PatientWho Left Message for Patient:  Alton the patient know what this is regarding?:  yes Best Call Back Number:  [phone] Information:  Pt is wanting to make sure her epidural injection with Dr Garza is cancelled, not wanting to reschedule at this time. Thank You

## 2025-04-25 ENCOUNTER — PROCEDURE VISIT (OUTPATIENT)
Dept: NEUROLOGY | Facility: CLINIC | Age: 60
End: 2025-04-25
Payer: COMMERCIAL

## 2025-04-25 VITALS
SYSTOLIC BLOOD PRESSURE: 124 MMHG | RESPIRATION RATE: 18 BRPM | DIASTOLIC BLOOD PRESSURE: 85 MMHG | BODY MASS INDEX: 25.67 KG/M2 | HEART RATE: 79 BPM | HEIGHT: 64 IN | WEIGHT: 150.38 LBS

## 2025-04-25 DIAGNOSIS — G43.719 INTRACTABLE CHRONIC MIGRAINE WITHOUT AURA AND WITHOUT STATUS MIGRAINOSUS: Primary | ICD-10-CM

## 2025-05-02 ENCOUNTER — TELEPHONE (OUTPATIENT)
Dept: PAIN MEDICINE | Facility: CLINIC | Age: 60
End: 2025-05-02
Payer: COMMERCIAL

## 2025-05-02 DIAGNOSIS — M54.16 LUMBAR RADICULITIS: Primary | ICD-10-CM

## 2025-05-02 DIAGNOSIS — M54.9 DORSALGIA, UNSPECIFIED: ICD-10-CM

## 2025-05-02 NOTE — TELEPHONE ENCOUNTER
I spoke to the patient.  I have placed a referral for her to start formal physical therapy at [facility].  Can you please fax the referral over to them.

## 2025-05-06 ENCOUNTER — PATIENT MESSAGE (OUTPATIENT)
Dept: FAMILY MEDICINE | Facility: CLINIC | Age: 60
End: 2025-05-06
Payer: COMMERCIAL

## 2025-05-06 ENCOUNTER — E-VISIT (OUTPATIENT)
Dept: URGENT CARE | Facility: CLINIC | Age: 60
End: 2025-05-06
Payer: COMMERCIAL

## 2025-05-06 DIAGNOSIS — H10.31 ACUTE BACTERIAL CONJUNCTIVITIS OF RIGHT EYE: Primary | ICD-10-CM

## 2025-05-06 RX ORDER — POLYMYXIN B SULFATE AND TRIMETHOPRIM 1; 10000 MG/ML; [USP'U]/ML
1 SOLUTION OPHTHALMIC EVERY 6 HOURS
Qty: 10 ML | Refills: 0 | Status: SHIPPED | OUTPATIENT
Start: 2025-05-06 | End: 2025-05-13

## 2025-05-06 NOTE — PROGRESS NOTES
Dear Mayra,     Thank you for reaching out to me for an E-Visit so we can address your concern regarding: Conjunctivitis (Entered automatically based on patient selection in [employer].)     I have reviewed your chart and read the answers to the questionnaire that you completed.  Based on the information provided, my diagnosis and recommendations are as follows:    Visit Diagnosis    1. Acute bacterial conjunctivitis of right eye      Medications Ordered                [id number] - [place] [address] AT [place] [address] [address] [address]    Telephone: [phone]   Fax: [phone]   Hours: Open 24 hours                         E-Prescribed (1 of 1)              polymyxin B sulf-trimethoprim (POLYTRIM) 10,000 unit- 1 mg/mL Drop    Sig: Place 1 drop into the right eye every 6 (six) hours. for 7 days       Start: 5/6/25     Quantity: 10 mL Refills: 0                            Visit Orders  No orders of the defined types were placed in this encounter.       Please let me know if there is something else that you need.  If you send additional messages concerning this illness, please use this message thread to communicate.      Kitty Sung MD

## 2025-05-12 ENCOUNTER — PATIENT MESSAGE (OUTPATIENT)
Dept: FAMILY MEDICINE | Facility: CLINIC | Age: 60
End: 2025-05-12
Payer: COMMERCIAL

## 2025-05-12 DIAGNOSIS — E06.3 HYPOTHYROIDISM DUE TO HASHIMOTO'S THYROIDITIS: ICD-10-CM

## 2025-05-12 DIAGNOSIS — Z00.00 WELL ADULT EXAM: Primary | ICD-10-CM

## 2025-05-12 DIAGNOSIS — I10 ESSENTIAL HYPERTENSION, MALIGNANT: ICD-10-CM

## 2025-05-13 ENCOUNTER — RESULTS FOLLOW-UP (OUTPATIENT)
Dept: FAMILY MEDICINE | Facility: CLINIC | Age: 60
End: 2025-05-13

## 2025-05-13 ENCOUNTER — LAB VISIT (OUTPATIENT)
Dept: LAB | Facility: HOSPITAL | Age: 60
End: 2025-05-13
Attending: FAMILY MEDICINE
Payer: COMMERCIAL

## 2025-05-13 ENCOUNTER — PATIENT MESSAGE (OUTPATIENT)
Dept: FAMILY MEDICINE | Facility: CLINIC | Age: 60
End: 2025-05-13
Payer: COMMERCIAL

## 2025-05-13 DIAGNOSIS — G43.719 INTRACTABLE CHRONIC MIGRAINE WITHOUT AURA AND WITHOUT STATUS MIGRAINOSUS: ICD-10-CM

## 2025-05-13 DIAGNOSIS — E06.3 HYPOTHYROIDISM DUE TO HASHIMOTO'S THYROIDITIS: ICD-10-CM

## 2025-05-13 DIAGNOSIS — Z00.00 WELL ADULT EXAM: ICD-10-CM

## 2025-05-13 LAB
ABSOLUTE EOSINOPHIL (OHS): 0.15 K/UL
ABSOLUTE MONOCYTE (OHS): 0.26 K/UL (ref 0.3–1)
ABSOLUTE NEUTROPHIL COUNT (OHS): 1.87 K/UL (ref 1.8–7.7)
ALBUMIN SERPL BCP-MCNC: 3.9 G/DL (ref 3.5–5.2)
ALP SERPL-CCNC: 54 UNIT/L (ref 40–150)
ALT SERPL W/O P-5'-P-CCNC: 15 UNIT/L (ref 10–44)
ANION GAP (OHS): 7 MMOL/L (ref 8–16)
AST SERPL-CCNC: 17 UNIT/L (ref 11–45)
BASOPHILS # BLD AUTO: 0.04 K/UL
BASOPHILS NFR BLD AUTO: 1.2 %
BILIRUB SERPL-MCNC: 0.4 MG/DL (ref 0.1–1)
BUN SERPL-MCNC: 12 MG/DL (ref 8–23)
CALCIUM SERPL-MCNC: 9.2 MG/DL (ref 8.7–10.5)
CHLORIDE SERPL-SCNC: 108 MMOL/L (ref 95–110)
CHOLEST SERPL-MCNC: 158 MG/DL (ref 120–199)
CHOLEST/HDLC SERPL: 2.7 {RATIO} (ref 2–5)
CO2 SERPL-SCNC: 27 MMOL/L (ref 23–29)
CREAT SERPL-MCNC: 0.7 MG/DL (ref 0.5–1.4)
EAG (OHS): 108 MG/DL (ref 68–131)
ERYTHROCYTE [DISTWIDTH] IN BLOOD BY AUTOMATED COUNT: 12.8 % (ref 11.5–14.5)
GFR SERPLBLD CREATININE-BSD FMLA CKD-EPI: >60 ML/MIN/1.73/M2
GLUCOSE SERPL-MCNC: 93 MG/DL (ref 70–110)
HBA1C MFR BLD: 5.4 % (ref 4–5.6)
HCT VFR BLD AUTO: 39.8 % (ref 37–48.5)
HDLC SERPL-MCNC: 59 MG/DL (ref 40–75)
HDLC SERPL: 37.3 % (ref 20–50)
HGB BLD-MCNC: 12.6 GM/DL (ref 12–16)
IMM GRANULOCYTES # BLD AUTO: 0.01 K/UL (ref 0–0.04)
IMM GRANULOCYTES NFR BLD AUTO: 0.3 % (ref 0–0.5)
LDLC SERPL CALC-MCNC: 88.4 MG/DL (ref 63–159)
LYMPHOCYTES # BLD AUTO: 1.03 K/UL (ref 1–4.8)
MCH RBC QN AUTO: 29.8 PG (ref 27–31)
MCHC RBC AUTO-ENTMCNC: 31.7 G/DL (ref 32–36)
MCV RBC AUTO: 94 FL (ref 82–98)
NONHDLC SERPL-MCNC: 99 MG/DL
NUCLEATED RBC (/100WBC) (OHS): 0 /100 WBC
PLATELET # BLD AUTO: 304 K/UL (ref 150–450)
PMV BLD AUTO: 9.4 FL (ref 9.2–12.9)
POTASSIUM SERPL-SCNC: 4.9 MMOL/L (ref 3.5–5.1)
PROT SERPL-MCNC: 7.1 GM/DL (ref 6–8.4)
RBC # BLD AUTO: 4.23 M/UL (ref 4–5.4)
RELATIVE EOSINOPHIL (OHS): 4.5 %
RELATIVE LYMPHOCYTE (OHS): 30.7 % (ref 18–48)
RELATIVE MONOCYTE (OHS): 7.7 % (ref 4–15)
RELATIVE NEUTROPHIL (OHS): 55.6 % (ref 38–73)
SODIUM SERPL-SCNC: 142 MMOL/L (ref 136–145)
TRIGL SERPL-MCNC: 53 MG/DL (ref 30–150)
TSH SERPL-ACNC: 0.74 UIU/ML (ref 0.4–4)
WBC # BLD AUTO: 3.36 K/UL (ref 3.9–12.7)

## 2025-05-13 PROCEDURE — 36415 COLL VENOUS BLD VENIPUNCTURE: CPT | Mod: PO

## 2025-05-13 PROCEDURE — 83036 HEMOGLOBIN GLYCOSYLATED A1C: CPT

## 2025-05-13 PROCEDURE — 80061 LIPID PANEL: CPT

## 2025-05-13 PROCEDURE — 85025 COMPLETE CBC W/AUTO DIFF WBC: CPT

## 2025-05-13 PROCEDURE — 84443 ASSAY THYROID STIM HORMONE: CPT

## 2025-05-13 PROCEDURE — 82435 ASSAY OF BLOOD CHLORIDE: CPT

## 2025-05-13 RX ORDER — ATOGEPANT 30 MG/1
30 TABLET ORAL DAILY
Qty: 30 TABLET | Refills: 3 | Status: CANCELLED | OUTPATIENT
Start: 2025-05-13

## 2025-05-14 RX ORDER — ATOGEPANT 30 MG/1
30 TABLET ORAL DAILY
Qty: 30 TABLET | Refills: 3 | Status: SHIPPED | OUTPATIENT
Start: 2025-05-14

## 2025-05-16 ENCOUNTER — OFFICE VISIT (OUTPATIENT)
Dept: FAMILY MEDICINE | Facility: CLINIC | Age: 60
End: 2025-05-16
Payer: COMMERCIAL

## 2025-05-16 VITALS
BODY MASS INDEX: 25.36 KG/M2 | TEMPERATURE: 97 F | WEIGHT: 148.56 LBS | SYSTOLIC BLOOD PRESSURE: 115 MMHG | DIASTOLIC BLOOD PRESSURE: 76 MMHG | HEART RATE: 94 BPM | HEIGHT: 64 IN | OXYGEN SATURATION: 100 %

## 2025-05-16 DIAGNOSIS — Z87.74 S/P PATENT FORAMEN OVALE CLOSURE: ICD-10-CM

## 2025-05-16 DIAGNOSIS — E06.3 HYPOTHYROIDISM DUE TO HASHIMOTO'S THYROIDITIS: ICD-10-CM

## 2025-05-16 DIAGNOSIS — Z00.00 WELL ADULT EXAM: Primary | ICD-10-CM

## 2025-05-16 DIAGNOSIS — J45.40 MODERATE PERSISTENT ASTHMA, UNCOMPLICATED: ICD-10-CM

## 2025-05-16 DIAGNOSIS — G43.719 INTRACTABLE CHRONIC MIGRAINE WITHOUT AURA AND WITHOUT STATUS MIGRAINOSUS: ICD-10-CM

## 2025-05-16 DIAGNOSIS — E78.00 HYPERCHOLESTEROLEMIA: ICD-10-CM

## 2025-05-16 DIAGNOSIS — I48.0 PAROXYSMAL ATRIAL FIBRILLATION: ICD-10-CM

## 2025-05-16 PROCEDURE — 99999 PR PBB SHADOW E&M-EST. PATIENT-LVL V: CPT | Mod: PBBFAC,,, | Performed by: FAMILY MEDICINE

## 2025-05-16 RX ORDER — ATORVASTATIN CALCIUM 10 MG/1
10 TABLET, FILM COATED ORAL DAILY
Qty: 90 TABLET | Refills: 3 | Status: SHIPPED | OUTPATIENT
Start: 2025-05-16

## 2025-05-16 RX ORDER — THYROID 60 MG/1
60 TABLET ORAL
Qty: 90 TABLET | Refills: 3 | Status: SHIPPED | OUTPATIENT
Start: 2025-05-16

## 2025-05-16 NOTE — PROGRESS NOTES
"Subjective:       Patient ID: Mayra Smart is a 59 y.o. female.    Chief Complaint: Annual Exam    Patient here today for annual well adult exam.   Tries to eat a healthy diet.  Exercises regularly.    Immunizations: Due Shingrix  Last Lab work: 2025  Colon Ca screening: Colonoscopy 2023   Breast Ca Screening: MMG 2024  Cervical Ca Screening: s/p Hyst     Hypothyroidism:  On Thyroid replacement with NP thyroid.  Last TSH 0.7 in May 2025  Migraine Headaches: Seeing Neurology.  Getting Botox.   Having 2-3 migraines per week.  Decreased since PFO closure. On Qulipta for prevention and Ubverly PRN  Asthma: now seeing Pulm and Allergy.  She is on Trelegy and Xopenex PRN  patent foramen ovale :  closed in July.   -she has been having atrial fib and tachycardia problem subsequent.  If she goes into A.fib, she takes Xeralto, Flecainide and Atenolol PRN.  She is on DAPT with ASA and Plavix.  HLD:  She is on Lipitor.  Cholesterol to goal in May 2025  androgenetic alopecia:  seeing Derm.  On Propecia and Minoxidil.  Back pain:  saw pain mgt, had injection.        Review of Systems   Constitutional:  Negative for activity change, appetite change, fatigue and fever.   Respiratory:  Negative for cough, shortness of breath and wheezing.    Cardiovascular:  Negative for chest pain and palpitations.   Gastrointestinal:  Negative for abdominal pain, constipation, diarrhea and nausea.   Skin:  Negative for pallor and rash.   Neurological:  Negative for dizziness, syncope, light-headedness and headaches.       Objective:      Vitals:    05/16/25 0840   BP: 115/76   Pulse: 94   Temp: 96.9 °F (36.1 °C)   SpO2: 100%   Weight: 67.4 kg (148 lb 9.4 oz)   Height: 5' 4" (1.626 m)      Physical Exam  Constitutional:       General: She is not in acute distress.  Cardiovascular:      Rate and Rhythm: Normal rate and regular rhythm.      Heart sounds: Normal heart sounds. No murmur heard.  Pulmonary:      Effort: Pulmonary effort is " normal. No respiratory distress.      Breath sounds: Normal breath sounds. No wheezing, rhonchi or rales.   Musculoskeletal:         General: No swelling.   Skin:     General: Skin is warm and dry.   Neurological:      General: No focal deficit present.      Mental Status: She is alert.   Psychiatric:         Mood and Affect: Mood normal.         Behavior: Behavior normal.         Thought Content: Thought content normal.         Results for orders placed or performed in visit on 05/13/25   Urinalysis, Reflex to Urine Culture Urine, Clean Catch    Collection Time: 05/13/25  7:17 AM    Specimen: Urine   Result Value Ref Range    Color, UA Yellow Straw, Olena, Yellow, Light-Orange    Appearance, UA Clear Clear    pH, UA 6.0 5.0 - 8.0    Spec Grav UA 1.025 1.005 - 1.030    Protein, UA Negative Negative    Glucose, UA Negative Negative    Ketones, UA Negative Negative    Bilirubin, UA Negative Negative    Blood, UA Negative Negative    Nitrites, UA Negative Negative    Leukocyte Esterase, UA Negative Negative     *Note: Due to a large number of results and/or encounters for the requested time period, some results have not been displayed. A complete set of results can be found in Results Review.      Assessment:       1. Well adult exam    2. Hypothyroidism due to Hashimoto's thyroiditis    3. Moderate persistent asthma, uncomplicated    4. S/P patent foramen ovale closure    5. Intractable chronic migraine without aura and without status migrainosus    6. Paroxysmal atrial fibrillation    7. Hypercholesterolemia        Plan:       Well adult exam  Continue to work on dietary improvements (decrease overall calorie intake, decrease sugar and carb intake, decrease animal protein intake)  Continue to exercise at least 30-40 minutes, 3 times per week  Immunizations were discussed and shingrix at pharmacy  Preventative exams were discussed and up to date   Hypothyroidism due to Hashimoto's thyroiditis  -     thyroid, pork, (NP  THYROID) 60 mg Tab; Take 1 tablet (60 mg total) by mouth before breakfast.  Dispense: 90 tablet; Refill: 3  -     TSH; Future; Expected date: 11/16/2025  Continue current medication  Moderate persistent asthma, uncomplicated  Continue current medication  S/P patent foramen ovale closure    Intractable chronic migraine without aura and without status migrainosus  Continue current medication  Paroxysmal atrial fibrillation  -     Comprehensive Metabolic Panel; Future; Expected date: 11/16/2025    Hypercholesterolemia  -     atorvastatin (LIPITOR) 10 MG tablet; Take 1 tablet (10 mg total) by mouth once daily.  Dispense: 90 tablet; Refill: 3  -     Comprehensive Metabolic Panel; Future; Expected date: 11/16/2025  Continue current medication    F/u in 6 months         Medication List with Changes/Refills   Current Medications    ASPIRIN (ECOTRIN) 81 MG EC TABLET    Take 81 mg by mouth once daily.    ATENOLOL (TENORMIN) 25 MG TABLET    Take 2 tablets (50 mg total) by mouth every evening. As instructed    ATOGEPANT (QULIPTA) 30 MG TAB    Take 1 tablet (30 mg total) by mouth once daily.    CLINDAMYCIN (CLEOCIN) 300 MG CAPSULE    Take 1 capsule (300 mg total) by mouth every 8 (eight) hours. Take a probiotic while taking this medication. for 7 days    CLOPIDOGREL (PLAVIX) 75 MG TABLET    Take 75 mg by mouth once daily.    DIAZEPAM (VALIUM) 5 MG TABLET    Take 1 tablet (5 mg total) by mouth every 8 (eight) hours as needed for Anxiety.    EPINEPHRINE (EPIPEN) 0.3 MG/0.3 ML ATIN    Inject 0.3 mLs (0.3 mg total) into the muscle once as needed.    ESTRADIOL 0.05 MG/24 HR TD PTSW (VIVELLE-DOT) 0.05 MG/24 HR    Place 1 patch onto the skin twice a week.    FINASTERIDE (PROPECIA) 1 MG TABLET    Take 1 tablet (1 mg total) by mouth once daily.    FLECAINIDE (TAMBOCOR) 150 MG TAB    Take 1 tablet (150 mg total) by mouth daily as needed (afib).    FLUTICASONE-UMECLIDIN-VILANTER (TRELEGY ELLIPTA) 200-62.5-25 MCG INHALER    Inhale 1 puff  into the lungs once daily.    KETOCONAZOLE (NIZORAL) 2 % SHAMPOO    Lather into affected areas. Rinse off in 5-10 min. Repeat 2-3 times a week.    LEVALBUTEROL (XOPENEX HFA) 45 MCG/ACTUATION INHALER    Inhale 1-2 puffs into the lungs every 4 (four) hours as needed for Wheezing. This is a rescue inhaler medication and should be used as needed    METHOCARBAMOL (ROBAXIN) 500 MG TAB    Take 1 tablet (500 mg total) by mouth 2 (two) times daily as needed (muscle spasms).    MINOXIDIL (LONITEN) 2.5 MG TABLET    TAKE 1/2 TABLET(1.25 MG) BY MOUTH DAILY    PROGESTERONE (PROMETRIUM) 100 MG CAPSULE    Take 1 capsule (100 mg total) by mouth every evening.    TRIAMCINOLONE ACETONIDE 0.1% (KENALOG) 0.1 % CREAM    Apply topically 2 (two) times daily.    UBROGEPANT (UBRELVY) 100 MG TABLET    Start Ubrelvy 100 mg for headache attacks. May repeat once in 1 hour to a max of 2 per day.   Changed and/or Refilled Medications    Modified Medication Previous Medication    ATORVASTATIN (LIPITOR) 10 MG TABLET atorvastatin (LIPITOR) 10 MG tablet       Take 1 tablet (10 mg total) by mouth once daily.    TAKE 1 TABLET(10 MG) BY MOUTH EVERY DAY    THYROID, PORK, (NP THYROID) 60 MG TAB thyroid, pork, (NP THYROID) 60 mg Tab       Take 1 tablet (60 mg total) by mouth before breakfast.    TAKE 1 TABLET BY MOUTH EVERY DAY

## 2025-06-02 ENCOUNTER — PATIENT MESSAGE (OUTPATIENT)
Dept: PAIN MEDICINE | Facility: CLINIC | Age: 60
End: 2025-06-02
Payer: COMMERCIAL

## 2025-06-04 ENCOUNTER — TELEPHONE (OUTPATIENT)
Dept: GASTROENTEROLOGY | Facility: CLINIC | Age: 60
End: 2025-06-04
Payer: COMMERCIAL

## 2025-06-11 ENCOUNTER — PATIENT MESSAGE (OUTPATIENT)
Dept: FAMILY MEDICINE | Facility: CLINIC | Age: 60
End: 2025-06-11
Payer: COMMERCIAL

## 2025-06-11 ENCOUNTER — E-VISIT (OUTPATIENT)
Dept: URGENT CARE | Facility: CLINIC | Age: 60
End: 2025-06-11
Payer: COMMERCIAL

## 2025-06-11 DIAGNOSIS — Z71.84 TRAVEL ADVICE ENCOUNTER: ICD-10-CM

## 2025-06-11 DIAGNOSIS — Z87.19 HISTORY OF DIVERTICULITIS: Primary | ICD-10-CM

## 2025-06-11 RX ORDER — SULFAMETHOXAZOLE AND TRIMETHOPRIM 800; 160 MG/1; MG/1
1 TABLET ORAL 2 TIMES DAILY
Qty: 14 TABLET | Refills: 0 | Status: SHIPPED | OUTPATIENT
Start: 2025-06-11 | End: 2025-06-18

## 2025-06-11 RX ORDER — METRONIDAZOLE 500 MG/1
500 TABLET ORAL EVERY 8 HOURS
Qty: 21 TABLET | Refills: 0 | Status: SHIPPED | OUTPATIENT
Start: 2025-06-11 | End: 2025-06-18

## 2025-06-11 NOTE — PROGRESS NOTES
Patient ID: Mayra Smart is a 60 y.o. female.    Chief Complaint: Travel Consult (Entered automatically based on patient selection in [employer].)          274}  The patient initiated a request through [employer] on 6/11/2025 for evaluation and management with a chief complaint of Travel Consult (Entered automatically based on patient selection in [employer].)     I evaluated the questionnaire submission on 06/11/2025 .    Total Time (in minutes): 7     Ohs Peq Evisit Travel    6/11/2025  2:49 PM CDT - Filed by Patient   Do you agree to participate in an E-Visit? Yes   If you have any of the following symptoms, please present to your local emergency room or call 911: I acknowledge   Choose the state of your primary residence [place]   What is the main issue you would like addressed today? Potential diverticulitis when traveling   I would like to address: Medication for Travel Concerns   What countries and cities are you visiting? [place]   What is the purpose of your trip? Visiting family or friends   How many days is your trip? 21   Do you plan to do any of these activities during your trip? Hiking or trekking;  Visit rural area or village;  Work with animals   Where will you stay during your trip? Private home   Do you need a medication for any of the following? Other   What do you need medication for? Diverticulitis   Have you gotten any vaccines from a pharmacy or outside clinic? No   Would you like vaccine recommendations for your trip? No   Please upload any travel documents, accommodations, or forms for your provider to complete.     Provide any additional information you feel is important. Flagyl   Please attach any relevant images or files    Are you able to take your vital signs? No   Do you want to address a new or existing medication? Address a current medication   Would you like to change or continue your medication? Continue medication   What is the name of the medication you would like to  continue? Flagyl   Are you taking your medication as prescribed? Yes    What medical condition is the  medication intended to treat? Diverticulitis   Has the medication helped your condition? Yes   Have you experienced any side effects from the medication? No   Provide any additional information you feel is important. It is not uncommon for me to have a flar up of diverticulitis when I travel- i usually travel with Cipro and Flagyl- but Flagyl is the most important med   Please attach any relevant images or files    Are you able to take your vital signs? No          Active Problem List with Overview Notes    Diagnosis Date Noted    Hypercholesterolemia 05/16/2025    Paroxysmal atrial fibrillation 01/16/2025    Chronic right shoulder pain 10/28/2024    Weakness of right upper extremity 10/28/2024    Decreased range of motion of right shoulder 10/28/2024    Moderate persistent asthma, uncomplicated 10/10/2024    Recurrent bronchiectasis 10/10/2024    S/P patent foramen ovale closure 09/19/2024 07/2024: Atrial septal defect/PFO closure using a 30 mm [place] helix device.       Cough 09/19/2024    PFO with atrial septal aneurysm 04/16/2024 07/2024 : Atrial septal defect/PFO closure using a 30 mm [place] helix device.       Decreased range of motion (ROM) of shoulder 05/12/2023    Weakness of upper extremity 05/12/2023    Intractable chronic migraine without aura and without status migrainosus 01/10/2023    Status migrainosus 12/15/2022    Plantar fasciitis of right foot 04/22/2022    Plantar fasciitis of left foot 04/22/2022    Hypothyroidism due to Hashimoto's thyroiditis 03/07/2022    Palpitations 03/07/2022    Essential hypertension, malignant     Fibroids, submucosal 11/06/2012    LLQ abdominal pain 11/06/2012      Recent Labs Obtained:  Lab Results   Component Value Date    WBC 3.36 (L) 05/13/2025    HGB 12.6 05/13/2025    HCT 39.8 05/13/2025    MCV 94 05/13/2025     05/13/2025     05/13/2025    K  4.9 05/13/2025    GLU 93 05/13/2025    CREATININE 0.7 05/13/2025    EGFRNORACEVR >60 05/13/2025    HGBA1C 5.4 05/13/2025    TSH 0.742 05/13/2025      Review of patient's allergies indicates:   Allergen Reactions    Cephalosporins Anaphylaxis    Penicillins Anaphylaxis    Codeine Itching and Swelling    Opioids - morphine analogues      Vertigo, nausea, itchy throat, tongue enlgmt, rash    Vicoprofen [hydrocodone-ibuprofen] Other (See Comments)     Severe nausea       Encounter Diagnoses   Name Primary?    History of diverticulitis Yes    Travel advice encounter         No orders of the defined types were placed in this encounter.     Medications Ordered This Encounter   Medications    metroNIDAZOLE (FLAGYL) 500 MG tablet     Sig: Take 1 tablet (500 mg total) by mouth every 8 (eight) hours. for 7 days     Dispense:  21 tablet     Refill:  0    sulfamethoxazole-trimethoprim 800-160mg (BACTRIM DS) 800-160 mg Tab     Sig: Take 1 tablet by mouth 2 (two) times daily. for 7 days     Dispense:  14 tablet     Refill:  0        E-Visit Time Tracking:    Day 1 Time (in minutes): 7    Total Time (in minutes): 7      274}

## 2025-06-13 ENCOUNTER — OFFICE VISIT (OUTPATIENT)
Dept: PAIN MEDICINE | Facility: CLINIC | Age: 60
End: 2025-06-13
Payer: COMMERCIAL

## 2025-06-13 ENCOUNTER — PATIENT MESSAGE (OUTPATIENT)
Dept: PAIN MEDICINE | Facility: CLINIC | Age: 60
End: 2025-06-13
Payer: COMMERCIAL

## 2025-06-13 VITALS
SYSTOLIC BLOOD PRESSURE: 137 MMHG | BODY MASS INDEX: 25.35 KG/M2 | DIASTOLIC BLOOD PRESSURE: 75 MMHG | HEART RATE: 93 BPM | WEIGHT: 147.69 LBS

## 2025-06-13 DIAGNOSIS — M54.9 DORSALGIA, UNSPECIFIED: ICD-10-CM

## 2025-06-13 DIAGNOSIS — M54.16 LUMBAR RADICULOPATHY: Primary | ICD-10-CM

## 2025-06-13 PROCEDURE — 99999 PR PBB SHADOW E&M-EST. PATIENT-LVL IV: CPT | Mod: PBBFAC,,, | Performed by: PHYSICIAN ASSISTANT

## 2025-06-13 RX ORDER — KETOROLAC TROMETHAMINE 30 MG/ML
30 INJECTION, SOLUTION INTRAMUSCULAR; INTRAVENOUS
Status: COMPLETED | OUTPATIENT
Start: 2025-06-13 | End: 2025-06-13

## 2025-06-13 RX ADMIN — KETOROLAC TROMETHAMINE 30 MG: 30 INJECTION, SOLUTION INTRAMUSCULAR; INTRAVENOUS at 01:06

## 2025-06-13 NOTE — PROGRESS NOTES
[facility] New Patient Evaluation      Referred by: No ref. provider found    PCP: Stephan Gibson MD     CC:   Chief Complaint   Patient presents with    Low-back Pain    Leg Pain          6/13/2025    10:51 AM 4/15/2025     9:23 AM 4/8/2024     3:47 PM   Last 3 PDI Scores   Pain Disability Index (PDI) [phone]         HPI:   Mayra Smart is a 60 y.o. female patient who has a past medical history of Back pain, Bell's palsy, Breast disorder, Fibroids, GERD (gastroesophageal reflux disease), Headache, History of anesthesia reaction, Neck pain, Pelvic pain in female, Preeclampsia, and Thyroid disease.     Ms. Nunez returns for follow up of of lower back and leg pain.  She last saw me in 2024 for neck pain.  Most recently seen by Dr. Cole for lower back and left-greater-than-right leg pain.  After review of an MRI, he recommended L4-5 epidural steroid injection.  She was scheduled for the procedure, but canceled because some home exercises were helping in addition to IM Toradol and IM methylprednisolone.  Initially her home exercises helped well, but then symptoms seem to worsen.  She was referred to physical therapy but not started yet as she is waiting until she gets back from a vacation in July.  She continues with pain that is really affecting her in the lower back and left leg.  Leg pain is becoming more prominent than in the past.  She leaves in a few days for a vacation is asking if we could consider IM Toradol again.  She has stopped both Plavix and Xarelto.    HPI Dr. Garza 4/2025  She presents with acute back pain that started approximately one week ago after lifting and lowering an aquarium.  She has a history of chronic back pain.  This is an acute on chronic problem today.  Pain radiates down both legs, with the left side worse than the right, extending completely down the back of her left leg and partially down her right leg. Initially, the pain was significantly more severe.  "Currently, she is experiencing significant discomfort, only able to lie on her back and use ice for relief. Heat application has been ineffective. The pain is causing nausea and stomach discomfort.  She has difficulty finding a comfortable position. She sleeps on the floor as the bed is too soft and exacerbates the pain.  For self-management, she has attempted a stretching exercise where she lies on her left side and moves her right leg back and up, which provides some relief. Ice application has also been helpful, while heat has not been effective.  Patient has tried various treatments for her condition. Ice application provides some relief, while heat application has been ineffective. She sleeps on the floor as the bed exacerbates her condition. A stretching exercise, which involves lying on her left side and moving her right leg back and up, provides some relief.      Pain Intervention History:  none    Past Spine Surgical History:  none    Past and current medications:  Antineuropathics:  NSAIDs: IM toradol  Physical therapy: no, currently too painful   Antidepressants:  Muscle relaxers: robaxin  Opioids:  Antiplatelets/Anticoagulants: aspirin, recently stopped plavix and xarelto  Others:  IIM methylprednisolone    History:  Current Medications[1]    Past Medical History:   Diagnosis Date    Back pain     Bell's palsy     during pregnancy    Breast disorder     fibercystic tissue    Fibroids     uterine    GERD (gastroesophageal reflux disease)     Headache     History of anesthesia reaction     sensitive to anesthesia, " Major hangover"    Neck pain     Pelvic pain in female     left lower quadrant    Preeclampsia     Thyroid disease        Past Surgical History:   Procedure Laterality Date     SECTION      times 2    CLOSURE, PFO  2024    Procedure: Closure, PFO;  Surgeon: Yoel Mccracken MD;  Location: [facility];  Service: Cardiology;;    COLONOSCOPY      x2    COLONOSCOPY N/A 2023    " Procedure: COLONOSCOPY;  Surgeon: Lloyd Fleming MD;  Location: [facility] ENDO;  Service: Gastroenterology;  Laterality: N/A;    DILATION AND CURETTAGE OF UTERUS  09/25/2007    ENDOMETRIAL ABLATION  10/22/2008    BTL    HYSTERECTOMY      INTRACARDIAC ECHOCARDIOGRAPHY  7/31/2024    Procedure: Intracardiac echocardiogram;  Surgeon: Yoel Mccracken MD;  Location: [facility] CATH;  Service: Cardiology;;    OOPHORECTOMY      REDUCTION OF BOTH BREASTS         Family History   Problem Relation Name Age of Onset    Pulmonary embolism Mother      Skin cancer Mother      Skin cancer Father      Thyroid disease Sister      Heart disease Brother      Breast cancer Neg Hx      Ovarian cancer Neg Hx         Social History     Socioeconomic History    Marital status:    Tobacco Use    Smoking status: Never     Passive exposure: Never    Smokeless tobacco: Never   Substance and Sexual Activity    Alcohol use: Yes     Comment: ocassionally    Drug use: Never    Sexual activity: Yes     Partners: Male     Birth control/protection: Post-menopausal, See Surgical Hx     Social Drivers of Health     Financial Resource Strain: Low Risk  (6/8/2025)    Overall Financial Resource Strain (CARDIA)     Difficulty of Paying Living Expenses: Not hard at all   Food Insecurity: No Food Insecurity (6/8/2025)    Hunger Vital Sign     Worried About Running Out of Food in the Last Year: Never true     Ran Out of Food in the Last Year: Never true   Transportation Needs: No Transportation Needs (6/8/2025)    PRAPARE - Transportation     Lack of Transportation (Medical): No     Lack of Transportation (Non-Medical): No   Physical Activity: Insufficiently Active (6/8/2025)    Exercise Vital Sign     Days of Exercise per Week: 4 days     Minutes of Exercise per Session: 30 min   Stress: No Stress Concern Present (6/8/2025)    [employer] - Occupational Stress Questionnaire     Feeling of Stress : Not at all   Housing Stability:  Low Risk  (6/8/2025)    Housing Stability Vital Sign     Unable to Pay for Housing in the Last Year: No     Number of Times Moved in the Last Year: 0     Homeless in the Last Year: No       Review of patient's allergies indicates:   Allergen Reactions    Cephalosporins Anaphylaxis    Penicillins Anaphylaxis    Codeine Itching and Swelling    Opioids - morphine analogues      Vertigo, nausea, itchy throat, tongue enlgmt, rash    Vicoprofen [hydrocodone-ibuprofen] Other (See Comments)     Severe nausea       Review of Systems:  12 point review of systems is negative.    Physical Exam:  Vitals:    06/13/25 1054   BP: 137/75   Pulse: 93   Weight: 67 kg (147 lb 11.3 oz)   PainSc:   6   PainLoc: Back     Body mass index is 25.35 kg/m².    Gen: NAD  Psych: mood appropriate for given condition  HEENT: eyes anicteric   CV: RRR  HEENT: anicteric   Respiratory: non-labored, no signs of respiratory distress  Abd: non-distended  Skin: warm, dry and intact.  Gait: antalgic gait.     Sensory:  Intact and symmetrical to light touch in L1-S1 dermatomes bilaterally.    Motor:     Right Left   L2/3 Iliacus Hip flexion  5  5   L3/4 Qudratus Femoris Knee Extension  5  5   L4/5 Tib Anterior Ankle Dorsiflexion   5  5   L5/S1 Extensor Hallicus Longus Great toe extension  5  5   S1/S2 Gastroc/Soleus Plantar Flexion  5  5      Right Left                  Patellar DTR 2+ 2+   Achilles DTR 2+ 2+                      Physical Exam               Labs:  Lab Results   Component Value Date    HGBA1C 5.4 05/13/2025       Lab Results   Component Value Date    WBC 3.36 (L) 05/13/2025    HGB 12.6 05/13/2025    HCT 39.8 05/13/2025    MCV 94 05/13/2025     05/13/2025       Imaging:  MRI lumbar spine 10/12/16  Findings:      The lumbar  vertebral body heights are preserved. The static anterior-posterior cervical vertebral body alignment is within normal limits.  No suspicious nodular bone marrow edema is visualized.  Small hemangioma in the L2  vertebral body is noted.  Varying degrees of disc desiccation are seen throughout the lumbar spine with relative sparing of the L5-S1 disc signal.  The conus medullaris terminate at approximately superior L2 level.     L1-L2 demonstrates minimal broad-based disc bulge and mild facet arthrosis without significant central spinal canal or neural foraminal stenosis.  L2-L3 demonstrates minimal broad-based disc bulge and mild bilateral facet arthrosis without significant central spinal canal or neural foraminal stenosis.  L3-L4 demonstrates mild broad-based disc bulge slightly asymmetric to the left and mild bilateral facet arthrosis.  No significant central spinal canal stenosis is seen.  Minimal left lateral recess narrowing is seen.  Minimal anterior-inferior left neural foraminal narrowing is seen.  L4-L5 demonstrates mild/moderate broad-based disc bulge with superimposed central annular fissure and broad-based central disc protrusion.  Ligamentum flavum hypertrophy and mild to moderate bilateral facet arthrosis is seen.  Mild central spinal canal narrowing is seen.  Minimal bilateral lateral recess narrowing is seen.  Mild left and minimal right neural foraminal narrowing is seen.  L5-S1 demonstrates mild broad-based disc bulge with possible small superimposed central broad-based disc protrusion and mild to moderate bilateral facet arthrosis.  No significant overall central spinal canal stenosis is seen.  Minimal anterior-inferior to right neural foraminal narrowing is seen.    Xray lumbar spine 4-15-25:  FINDINGS:  Bone density is normal.  The lumbar vertebral bodies maintain normal height.  There is no fracture.  Alignment is normal.  There is no significant disc space narrowing.  There is mild lower lumbar facet arthropathy.  There is no instability demonstrated with flexion or extension.    MRI lumbar spine 4-15-25:  FINDINGS:  Alignment: Normal.     Vertebral column: Vertebral body heights are maintained. No  acute fracture is identified; however, if trauma is suspected, a CT scan would be a more sensitive examination for fractures. Small ovoid T1/T2 hyperintensity in the L2 vertebral body suggestive of an incidental hemangioma.  Mild multilevel disc degeneration with disc desiccation, intervertebral disc space narrowing and disc bulges, with superimposed disc protrusions through annular fissures at L4-5 and L5-S1.     Cord: Normal.  Conus terminates at L2.     Disc levels:     T12-L1: Disc is normal configuration.  No neural foraminal or spinal canal stenosis.     L1-L2: Disc is normal in configuration.  No neural foraminal or spinal canal stenosis.     L2-L3: Mild circumferential disc bulge.  Mild bilateral facet arthropathy and ligamentum flavum thickening.  No neural foraminal or spinal canal stenosis.     L3-L4: Mild disc space narrowing.  Mild circumferential disc bulge.  Mild bilateral facet arthropathy and ligamentum flavum thickening.  No significant neural foraminal or spinal canal stenosis.     L4-L5: Mild disc space narrowing.  Circumferential disc bulge with superimposed small central disc protrusion through an annular fissure.  Moderate bilateral facet arthropathy.  Ligamentum flavum thickening.  Mild bilateral neural foraminal stenosis.  Mild spinal canal and right greater than left lateral recess stenosis.     L5-S1: Mild disc space narrowing.  Circumferential disc bulge with superimposed left central/subarticular disc protrusion through an annular fissure.  Moderate bilateral facet arthropathy.  Ligamentum flavum thickening.  Mild bilateral neural foraminal stenosis.  No significant spinal canal stenosis.  Mild left lateral recess stenosis.     Paraspinal muscles & soft tissues: Scattered images reveal a 3.3 cm T2 hyperintense right hepatic lobe mass previously characterized as a hemangioma.     Impression:     1. Mild multilevel degenerative changes of the lumbar spine, as detailed above, most  pronounced at L4-5 and resulting in mild bilateral neural foraminal narrowing and mild spinal canal and right greater than left lateral recess stenosis.  2. Mild bilateral neural foraminal and mild left lateral recess stenosis at L5-S1.       Assessment & Plan        Mayra Smart is a 60 y.o. female patient who has a past medical history of Back pain, Bell's palsy, Breast disorder, Fibroids, GERD (gastroesophageal reflux disease), Headache, History of anesthesia reaction, Neck pain, Pelvic pain in female, Preeclampsia, and Thyroid disease. She presents with acute on chronic back pain.     I personally reviewed MRI lumbar spine and x-ray lumbar spine taken 04/15/2025.  There is mild bilateral foraminal narrowing at L4-5 due to small disc bulge.  L5-S1 small disc bulge without significant foraminal narrowing.  Most significant changes at L4-5.    Chronic lower back and left leg radicular pain - myofascial and likely related to L4-5 small disc bulge and foraminal narrowing.    - recommend she continue with home exercise.  -recommend she start physical therapy as soon as she can.  - as she leaves in 2 days for an extended trip, we will honor her request to have IM Toradol injection done today.  She has not had any recent NSAIDs in his off Plavix and Xarelto.  - follow-up as needed when she returns from her trip regarding pain.  We can still consider L4-5 interlaminar CARMEN eyes previously recommended by Dr. Garza when she returns.    Diagnosis:  1. Lumbar radiculopathy  -     ketorolac injection 30 mg    2. Dorsalgia, unspecified  -     ketorolac injection 30 mg    : Not applicable      Jazmyne He PA-C  [facility]    This note was completed with dictation software and grammatical errors may exist.           [1]   Current Outpatient Medications:     aspirin (ECOTRIN) 81 MG EC tablet, Take 81 mg by mouth once daily., Disp: , Rfl:     atenoloL (TENORMIN) 25 MG tablet, Take 1 tablet (25  mg total) by mouth every evening. As instructed, Disp: 90 tablet, Rfl: 3    atogepant (QULIPTA) 30 mg Tab, Take 1 tablet (30 mg total) by mouth once daily., Disp: 30 tablet, Rfl: 3    atorvastatin (LIPITOR) 10 MG tablet, Take 1 tablet (10 mg total) by mouth once daily., Disp: 90 tablet, Rfl: 3    clopidogreL (PLAVIX) 75 mg tablet, Take 75 mg by mouth once daily., Disp: , Rfl:     diazePAM (VALIUM) 5 MG tablet, Take 1 tablet (5 mg total) by mouth every 8 (eight) hours as needed for Anxiety., Disp: 60 tablet, Rfl: 1    EPINEPHrine (EPIPEN) 0.3 mg/0.3 mL AtIn, Inject 0.3 mLs (0.3 mg total) into the muscle once as needed., Disp: 2 Device, Rfl: 3    estradiol 0.05 mg/24 hr td ptsw (VIVELLE-DOT) 0.05 mg/24 hr, Place 1 patch onto the skin twice a week., Disp: 8 patch, Rfl: 11    finasteride (PROPECIA) 1 mg tablet, Take 1 tablet (1 mg total) by mouth once daily., Disp: 90 tablet, Rfl: 1    flecainide (TAMBOCOR) 150 MG Tab, Take 1 tablet (150 mg total) by mouth daily as needed (afib)., Disp: 10 tablet, Rfl: 12    fluticasone-umeclidin-vilanter (TRELEGY ELLIPTA) 200-62.5-25 mcg inhaler, Inhale 1 puff into the lungs once daily., Disp: 60 each, Rfl: 11    ketoconazole (NIZORAL) 2 % shampoo, Lather into affected areas. Rinse off in 5-10 min. Repeat 2-3 times a week., Disp: 120 mL, Rfl: 3    levalbuterol (XOPENEX HFA) 45 mcg/actuation inhaler, Inhale 1-2 puffs into the lungs every 4 (four) hours as needed for Wheezing. This is a rescue inhaler medication and should be used as needed, Disp: 15 g, Rfl: 11    methocarbamoL (ROBAXIN) 500 MG Tab, Take 1 tablet (500 mg total) by mouth 2 (two) times daily as needed (muscle spasms)., Disp: 30 tablet, Rfl: 0    metroNIDAZOLE (FLAGYL) 500 MG tablet, Take 1 tablet (500 mg total) by mouth every 8 (eight) hours. for 7 days, Disp: 21 tablet, Rfl: 0    minoxidiL (LONITEN) 2.5 MG tablet, TAKE 1/2 TABLET(1.25 MG) BY MOUTH DAILY, Disp: 45 tablet, Rfl: 1    minoxidiL (LONITEN) 2.5 MG tablet, Take 1  tablet (2.5 mg total) by mouth once daily., Disp: 30 tablet, Rfl: 5    progesterone (PROMETRIUM) 100 MG capsule, Take 1 capsule (100 mg total) by mouth every evening., Disp: 30 capsule, Rfl: 12    sulfamethoxazole-trimethoprim 800-160mg (BACTRIM DS) 800-160 mg Tab, Take 1 tablet by mouth 2 (two) times daily. for 7 days, Disp: 14 tablet, Rfl: 0    thyroid, pork, (NP THYROID) 60 mg Tab, Take 1 tablet (60 mg total) by mouth before breakfast., Disp: 90 tablet, Rfl: 3    triamcinolone acetonide 0.1% (KENALOG) 0.1 % cream, Apply topically 2 (two) times daily., Disp: 45 g, Rfl: 1    ubrogepant (UBRELVY) 100 mg tablet, Start Ubrelvy 100 mg for headache attacks. May repeat once in 1 hour to a max of 2 per day., Disp: 16 tablet, Rfl: 11    Current Facility-Administered Medications:     ketorolac injection 30 mg, 30 mg, Intramuscular, 1 time in Clinic/HOD, Jazmyne He PA-C    onabotulinumtoxina injection 200 Units, 200 Units, Intramuscular, Q90 Days, , 200 Units at 08/16/24 0821    onabotulinumtoxina injection 200 Units, 200 Units, Intramuscular, Q90 Days, , 200 Units at 11/08/24 0939    onabotulinumtoxina injection 200 Units, 200 Units, Intramuscular, Q90 Days, , 200 Units at 01/31/25 0924    onabotulinumtoxina injection 200 Units, 200 Units, Intramuscular, Q90 Days, , 200 Units at 04/25/25 1420

## 2025-06-14 ENCOUNTER — TELEPHONE (OUTPATIENT)
Dept: FAMILY MEDICINE | Facility: CLINIC | Age: 60
End: 2025-06-14
Payer: COMMERCIAL

## 2025-06-14 NOTE — TELEPHONE ENCOUNTER
Copied from CRM #[id number]. Topic: Medications - Pharmacy  >> Jun 14, 2025 10:02 AM Merry wrote:  Type: Needs Medical Advice  Who Called:  Eliza    Pharmacy name and phone #:    [id number] - [place] [id number] [facility] [address]  [address]  [facility] [address]  Phone: [phone] Fax: [phone]        Best Call Back Number: [phone]    Additional Information: Pt was wanting refill for cipro, they were needing a response to get refills for pt asap please call to advise

## 2025-07-08 DIAGNOSIS — J45.50 SEVERE PERSISTENT ASTHMA, UNCOMPLICATED: ICD-10-CM

## 2025-07-08 RX ORDER — DOXYCYCLINE 100 MG/1
100 CAPSULE ORAL 2 TIMES DAILY
Qty: 28 CAPSULE | Refills: 2 | Status: SHIPPED | OUTPATIENT
Start: 2025-07-08

## 2025-07-18 ENCOUNTER — PROCEDURE VISIT (OUTPATIENT)
Dept: NEUROLOGY | Facility: CLINIC | Age: 60
End: 2025-07-18
Payer: COMMERCIAL

## 2025-07-18 VITALS
BODY MASS INDEX: 25.03 KG/M2 | RESPIRATION RATE: 17 BRPM | TEMPERATURE: 98 F | WEIGHT: 146.63 LBS | DIASTOLIC BLOOD PRESSURE: 87 MMHG | SYSTOLIC BLOOD PRESSURE: 115 MMHG | HEIGHT: 64 IN | HEART RATE: 88 BPM

## 2025-07-18 DIAGNOSIS — K59.03 DRUG-INDUCED CONSTIPATION: Primary | ICD-10-CM

## 2025-07-18 DIAGNOSIS — G43.719 INTRACTABLE CHRONIC MIGRAINE WITHOUT AURA AND WITHOUT STATUS MIGRAINOSUS: ICD-10-CM

## 2025-07-18 DIAGNOSIS — R11.0 NAUSEA: ICD-10-CM

## 2025-07-18 RX ORDER — METOCLOPRAMIDE 10 MG/1
10 TABLET ORAL EVERY 6 HOURS PRN
Qty: 60 TABLET | Refills: 11 | Status: SHIPPED | OUTPATIENT
Start: 2025-07-18

## 2025-07-18 RX ORDER — KETOROLAC TROMETHAMINE 30 MG/ML
15 INJECTION, SOLUTION INTRAMUSCULAR; INTRAVENOUS
Status: COMPLETED | OUTPATIENT
Start: 2025-07-18 | End: 2025-07-18

## 2025-07-18 RX ORDER — ONDANSETRON 2 MG/ML
8 INJECTION INTRAMUSCULAR; INTRAVENOUS
Status: COMPLETED | OUTPATIENT
Start: 2025-07-18 | End: 2025-07-18

## 2025-07-18 RX ORDER — ONDANSETRON 8 MG/1
8 TABLET, ORALLY DISINTEGRATING ORAL EVERY 12 HOURS PRN
Qty: 20 TABLET | Refills: 6 | Status: SHIPPED | OUTPATIENT
Start: 2025-07-18

## 2025-07-18 RX ADMIN — ONDANSETRON 8 MG: 2 INJECTION INTRAMUSCULAR; INTRAVENOUS at 02:07

## 2025-07-18 RX ADMIN — KETOROLAC TROMETHAMINE 15 MG: 30 INJECTION, SOLUTION INTRAMUSCULAR; INTRAVENOUS at 02:07

## 2025-07-18 NOTE — PROCEDURES
ProceduresBOTOX PROCEDURE    PROCEDURE PERFORMED: Botulinum toxin injection (56342)    CLINICAL INDICATION: G43.719    A time out was conducted just before the start of the procedure to verify the correct patient and procedure, procedure location, and all relevant critical information.   The patient meets criteria for chronic headaches according to the ICHD-II, the patient has more than 15 headaches a month out of at least 8 are migraines which last for more than 4 hours a day.    The Botox injections have achieved well over 50%  improvement in the patient's symptoms. Migraines have been reduced at least 7 days per month and the number of cumulative hours suffering with headaches has been reduced at least 100 total hours per month. Today she does have a headache indicating that the Botox has worn off. Frequency of treatment is every 3 months unless no response to the treatments, at which time we will discontinue the injections.      DESCRIPTION OF PROCEDURE: After obtaining informed consent and under aseptic technique, a total of 175 units of botulinum toxin type A were injected in the following muscles: Procerus 5 units,  5 units bilaterally, frontalis 20 units, temporalis 20 units bilaterally, occipitalis 15 units bilaterally, upper cervical paraspinals 10 units bilaterally, masseters 10 units bilaterally and trapezius 15 units bilaterally. The patient was given a total of 175 units in 31 sites.The patient tolerated the procedure well. There were no complications. The patient was given a prescription for repeat treatment in 3 months.     Unavoidable waste 25 units    The patient reported a severe headache. Associated with nausea. I injected 30 mg of IV Toradol and 8 mg of IV Zofran very slow push.      Hemanth Kowalski M.D  Medical Director, Headache and Facial Pain  [facility]

## 2025-07-21 ENCOUNTER — PATIENT MESSAGE (OUTPATIENT)
Dept: FAMILY MEDICINE | Facility: CLINIC | Age: 60
End: 2025-07-21
Payer: COMMERCIAL

## 2025-07-24 ENCOUNTER — OFFICE VISIT (OUTPATIENT)
Dept: FAMILY MEDICINE | Facility: CLINIC | Age: 60
End: 2025-07-24
Payer: COMMERCIAL

## 2025-07-24 VITALS
BODY MASS INDEX: 25.14 KG/M2 | OXYGEN SATURATION: 98 % | WEIGHT: 147.25 LBS | SYSTOLIC BLOOD PRESSURE: 118 MMHG | HEIGHT: 64 IN | HEART RATE: 88 BPM | DIASTOLIC BLOOD PRESSURE: 70 MMHG | TEMPERATURE: 98 F

## 2025-07-24 DIAGNOSIS — K58.1 IRRITABLE BOWEL SYNDROME WITH CONSTIPATION: Primary | ICD-10-CM

## 2025-07-24 PROCEDURE — 3008F BODY MASS INDEX DOCD: CPT | Mod: CPTII,S$GLB,, | Performed by: FAMILY MEDICINE

## 2025-07-24 PROCEDURE — 3044F HG A1C LEVEL LT 7.0%: CPT | Mod: CPTII,S$GLB,, | Performed by: FAMILY MEDICINE

## 2025-07-24 PROCEDURE — 3078F DIAST BP <80 MM HG: CPT | Mod: CPTII,S$GLB,, | Performed by: FAMILY MEDICINE

## 2025-07-24 PROCEDURE — 99213 OFFICE O/P EST LOW 20 MIN: CPT | Mod: S$GLB,,, | Performed by: FAMILY MEDICINE

## 2025-07-24 PROCEDURE — 1160F RVW MEDS BY RX/DR IN RCRD: CPT | Mod: CPTII,S$GLB,, | Performed by: FAMILY MEDICINE

## 2025-07-24 PROCEDURE — 99999 PR PBB SHADOW E&M-EST. PATIENT-LVL V: CPT | Mod: PBBFAC,,, | Performed by: FAMILY MEDICINE

## 2025-07-24 PROCEDURE — 3074F SYST BP LT 130 MM HG: CPT | Mod: CPTII,S$GLB,, | Performed by: FAMILY MEDICINE

## 2025-07-24 PROCEDURE — 1159F MED LIST DOCD IN RCRD: CPT | Mod: CPTII,S$GLB,, | Performed by: FAMILY MEDICINE

## 2025-07-24 NOTE — PROGRESS NOTES
"Subjective:       Patient ID: Mayra Smart is a 60 y.o. female.    Chief Complaint: Abdominal Pain     Here today for an acute visit.  She is here today complaining of abd pain x 2 months.   Having issues with constipation.  Then developed nausea.  She has been using Metamucil.  Tired Ducolax and then MOM.  Saw her neurologist and mentioned her Qulipta may be the cause.  Started her Linzess which she is taking daily for the past week.  Had a BM today.      Review of Systems   Constitutional:  Negative for activity change, appetite change, fatigue and fever.   Respiratory:  Negative for cough, shortness of breath and wheezing.    Cardiovascular:  Negative for chest pain and palpitations.   Gastrointestinal:  Positive for abdominal pain and constipation. Negative for diarrhea and nausea.   Skin:  Negative for pallor and rash.   Neurological:  Negative for dizziness, syncope, light-headedness and headaches.   Psychiatric/Behavioral:  The patient is not nervous/anxious.        Objective:      Vitals:    07/24/25 0901   BP: 118/70   Pulse: 88   Temp: 98.2 °F (36.8 °C)   TempSrc: Oral   SpO2: 98%   Weight: 66.8 kg (147 lb 4.3 oz)   Height: 5' 4" (1.626 m)      Physical Exam  Constitutional:       General: She is not in acute distress.  Cardiovascular:      Rate and Rhythm: Normal rate and regular rhythm.      Heart sounds: Normal heart sounds. No murmur heard.  Pulmonary:      Effort: Pulmonary effort is normal. No respiratory distress.      Breath sounds: Normal breath sounds. No wheezing, rhonchi or rales.   Abdominal:      General: Abdomen is flat. There is no distension.      Palpations: Abdomen is soft. There is no mass.      Tenderness: There is no abdominal tenderness. There is no guarding.   Neurological:      General: No focal deficit present.      Mental Status: She is alert.   Psychiatric:         Mood and Affect: Mood normal.         Behavior: Behavior normal.         Thought Content: Thought " content normal.            Assessment:       1. Irritable bowel syndrome with constipation        Plan:       Irritable bowel syndrome with constipation    I agree with Linzess and she has had significant improvement with its use over the past week.  Continue Linzess.  Increase fiber and water.        Medication List with Changes/Refills   Current Medications    ASPIRIN (ECOTRIN) 81 MG EC TABLET    Take 81 mg by mouth once daily.    ATENOLOL (TENORMIN) 25 MG TABLET    Take 1 tablet (25 mg total) by mouth every evening. As instructed    ATOGEPANT (QULIPTA) 30 MG TAB    Take 1 tablet (30 mg total) by mouth once daily.    ATORVASTATIN (LIPITOR) 10 MG TABLET    Take 1 tablet (10 mg total) by mouth once daily.    DIAZEPAM (VALIUM) 5 MG TABLET    Take 1 tablet (5 mg total) by mouth every 8 (eight) hours as needed for Anxiety.    DOXYCYCLINE (VIBRAMYCIN) 100 MG CAP    Take 1 capsule (100 mg total) by mouth 2 (two) times daily.    EPINEPHRINE (EPIPEN) 0.3 MG/0.3 ML ATIN    Inject 0.3 mLs (0.3 mg total) into the muscle once as needed.    ESTRADIOL 0.05 MG/24 HR TD PTSW (VIVELLE-DOT) 0.05 MG/24 HR    Place 1 patch onto the skin twice a week.    FINASTERIDE (PROPECIA) 1 MG TABLET    Take 1 tablet (1 mg total) by mouth once daily.    FLECAINIDE (TAMBOCOR) 150 MG TAB    Take 1 tablet (150 mg total) by mouth daily as needed (afib).    FLUTICASONE-UMECLIDIN-VILANTER (TRELEGY ELLIPTA) 200-62.5-25 MCG INHALER    Inhale 1 puff into the lungs once daily.    LEVALBUTEROL (XOPENEX HFA) 45 MCG/ACTUATION INHALER    Inhale 1-2 puffs into the lungs every 4 (four) hours as needed for Wheezing. This is a rescue inhaler medication and should be used as needed    LINACLOTIDE (LINZESS) 72 MCG CAP CAPSULE    Take 1 capsule (72 mcg total) by mouth before breakfast.    METHOCARBAMOL (ROBAXIN) 500 MG TAB    Take 1 tablet (500 mg total) by mouth 2 (two) times daily as needed (muscle spasms).    METOCLOPRAMIDE HCL (REGLAN) 10 MG TABLET    Take 1  tablet (10 mg total) by mouth every 6 (six) hours as needed (migraine or nausea).    MINOXIDIL (LONITEN) 2.5 MG TABLET    Take 1 tablet (2.5 mg total) by mouth once daily.    ONDANSETRON (ZOFRAN-ODT) 8 MG TBDL    Take 1 tablet (8 mg total) by mouth every 12 (twelve) hours as needed.    PROGESTERONE (PROMETRIUM) 100 MG CAPSULE    Take 1 capsule (100 mg total) by mouth every evening.    THYROID, PORK, (NP THYROID) 60 MG TAB    Take 1 tablet (60 mg total) by mouth before breakfast.    TRIAMCINOLONE ACETONIDE 0.1% (KENALOG) 0.1 % CREAM    Apply topically 2 (two) times daily.

## 2025-08-01 ENCOUNTER — PATIENT MESSAGE (OUTPATIENT)
Dept: NEUROLOGY | Facility: CLINIC | Age: 60
End: 2025-08-01
Payer: COMMERCIAL

## 2025-08-01 DIAGNOSIS — G43.719 INTRACTABLE CHRONIC MIGRAINE WITHOUT AURA AND WITHOUT STATUS MIGRAINOSUS: Primary | ICD-10-CM

## 2025-08-01 RX ORDER — ATOGEPANT 60 MG/1
60 TABLET ORAL DAILY
Qty: 30 TABLET | Refills: 6 | Status: SHIPPED | OUTPATIENT
Start: 2025-08-01

## 2025-08-07 ENCOUNTER — TELEPHONE (OUTPATIENT)
Dept: FAMILY MEDICINE | Facility: CLINIC | Age: 60
End: 2025-08-07
Payer: COMMERCIAL

## 2025-08-20 ENCOUNTER — PATIENT MESSAGE (OUTPATIENT)
Dept: PAIN MEDICINE | Facility: CLINIC | Age: 60
End: 2025-08-20
Payer: COMMERCIAL